# Patient Record
Sex: FEMALE | Race: WHITE | NOT HISPANIC OR LATINO | Employment: OTHER | URBAN - METROPOLITAN AREA
[De-identification: names, ages, dates, MRNs, and addresses within clinical notes are randomized per-mention and may not be internally consistent; named-entity substitution may affect disease eponyms.]

---

## 2017-05-11 ENCOUNTER — TELEPHONE (OUTPATIENT)
Dept: CARDIOLOGY | Facility: CLINIC | Age: 82
End: 2017-05-11

## 2017-05-17 ENCOUNTER — OFFICE VISIT (OUTPATIENT)
Dept: CARDIOLOGY | Facility: CLINIC | Age: 82
End: 2017-05-17

## 2017-05-17 VITALS — SYSTOLIC BLOOD PRESSURE: 130 MMHG | DIASTOLIC BLOOD PRESSURE: 80 MMHG | HEIGHT: 63 IN | WEIGHT: 130 LBS

## 2017-05-17 DIAGNOSIS — I49.3 PVC (PREMATURE VENTRICULAR CONTRACTION): Primary | ICD-10-CM

## 2017-05-17 DIAGNOSIS — I47.1 SUPRAVENTRICULAR TACHYCARDIA (HCC): ICD-10-CM

## 2017-05-17 DIAGNOSIS — I34.0 NON-RHEUMATIC MITRAL REGURGITATION: ICD-10-CM

## 2017-05-17 PROCEDURE — 99214 OFFICE O/P EST MOD 30 MIN: CPT | Performed by: INTERNAL MEDICINE

## 2017-05-17 PROCEDURE — 93000 ELECTROCARDIOGRAM COMPLETE: CPT | Performed by: INTERNAL MEDICINE

## 2017-05-17 RX ORDER — ASPIRIN 81 MG/1
81 TABLET ORAL DAILY
COMMUNITY

## 2017-05-17 RX ORDER — CARVEDILOL 3.12 MG/1
3.12 TABLET ORAL 2 TIMES DAILY
Qty: 180 TABLET | Refills: 3 | Status: SHIPPED | OUTPATIENT
Start: 2017-05-17 | End: 2018-01-04

## 2018-01-04 ENCOUNTER — APPOINTMENT (OUTPATIENT)
Dept: GENERAL RADIOLOGY | Facility: HOSPITAL | Age: 83
End: 2018-01-04

## 2018-01-04 ENCOUNTER — HOSPITAL ENCOUNTER (EMERGENCY)
Facility: HOSPITAL | Age: 83
Discharge: HOME | End: 2018-01-04
Attending: EMERGENCY MEDICINE | Admitting: EMERGENCY MEDICINE

## 2018-01-04 VITALS
HEIGHT: 63 IN | HEART RATE: 85 BPM | WEIGHT: 150 LBS | BODY MASS INDEX: 26.58 KG/M2 | DIASTOLIC BLOOD PRESSURE: 76 MMHG | SYSTOLIC BLOOD PRESSURE: 115 MMHG | TEMPERATURE: 97.9 F | RESPIRATION RATE: 15 BRPM | OXYGEN SATURATION: 95 %

## 2018-01-04 DIAGNOSIS — I47.1 PSVT (PAROXYSMAL SUPRAVENTRICULAR TACHYCARDIA) (HCC): Primary | ICD-10-CM

## 2018-01-04 LAB
ALBUMIN SERPL-MCNC: 4 G/DL (ref 3.5–5.2)
ALBUMIN/GLOB SERPL: 1.3 G/DL
ALP SERPL-CCNC: 61 U/L (ref 39–117)
ALT SERPL W P-5'-P-CCNC: 16 U/L (ref 1–33)
ANION GAP SERPL CALCULATED.3IONS-SCNC: 9 MMOL/L
AST SERPL-CCNC: 21 U/L (ref 1–32)
BASOPHILS # BLD AUTO: 0.03 10*3/MM3 (ref 0–0.2)
BASOPHILS NFR BLD AUTO: 0.3 % (ref 0–1.5)
BILIRUB SERPL-MCNC: 0.4 MG/DL (ref 0.1–1.2)
BUN BLD-MCNC: 15 MG/DL (ref 8–23)
BUN/CREAT SERPL: 17 (ref 7–25)
CALCIUM SPEC-SCNC: 9 MG/DL (ref 8.2–9.6)
CHLORIDE SERPL-SCNC: 98 MMOL/L (ref 98–107)
CO2 SERPL-SCNC: 29 MMOL/L (ref 22–29)
CREAT BLD-MCNC: 0.88 MG/DL (ref 0.57–1)
DEPRECATED RDW RBC AUTO: 45.6 FL (ref 37–54)
EOSINOPHIL # BLD AUTO: 0.2 10*3/MM3 (ref 0–0.7)
EOSINOPHIL NFR BLD AUTO: 2.2 % (ref 0.3–6.2)
ERYTHROCYTE [DISTWIDTH] IN BLOOD BY AUTOMATED COUNT: 13.2 % (ref 11.7–13)
GFR SERPL CREATININE-BSD FRML MDRD: 60 ML/MIN/1.73
GLOBULIN UR ELPH-MCNC: 3.2 GM/DL
GLUCOSE BLD-MCNC: 158 MG/DL (ref 65–99)
HCT VFR BLD AUTO: 38.6 % (ref 35.6–45.5)
HGB BLD-MCNC: 12.2 G/DL (ref 11.9–15.5)
IMM GRANULOCYTES # BLD: 0.02 10*3/MM3 (ref 0–0.03)
IMM GRANULOCYTES NFR BLD: 0.2 % (ref 0–0.5)
LYMPHOCYTES # BLD AUTO: 1.42 10*3/MM3 (ref 0.9–4.8)
LYMPHOCYTES NFR BLD AUTO: 15.9 % (ref 19.6–45.3)
MAGNESIUM SERPL-MCNC: 2.4 MG/DL (ref 1.7–2.3)
MCH RBC QN AUTO: 29.9 PG (ref 26.9–32)
MCHC RBC AUTO-ENTMCNC: 31.6 G/DL (ref 32.4–36.3)
MCV RBC AUTO: 94.6 FL (ref 80.5–98.2)
MONOCYTES # BLD AUTO: 1 10*3/MM3 (ref 0.2–1.2)
MONOCYTES NFR BLD AUTO: 11.2 % (ref 5–12)
NEUTROPHILS # BLD AUTO: 6.25 10*3/MM3 (ref 1.9–8.1)
NEUTROPHILS NFR BLD AUTO: 70.2 % (ref 42.7–76)
PLATELET # BLD AUTO: 209 10*3/MM3 (ref 140–500)
PMV BLD AUTO: 9.5 FL (ref 6–12)
POTASSIUM BLD-SCNC: 4.1 MMOL/L (ref 3.5–5.2)
PROT SERPL-MCNC: 7.2 G/DL (ref 6–8.5)
RBC # BLD AUTO: 4.08 10*6/MM3 (ref 3.9–5.2)
SODIUM BLD-SCNC: 136 MMOL/L (ref 136–145)
TROPONIN T SERPL-MCNC: <0.01 NG/ML (ref 0–0.03)
WBC NRBC COR # BLD: 8.92 10*3/MM3 (ref 4.5–10.7)

## 2018-01-04 PROCEDURE — 83735 ASSAY OF MAGNESIUM: CPT | Performed by: EMERGENCY MEDICINE

## 2018-01-04 PROCEDURE — 96361 HYDRATE IV INFUSION ADD-ON: CPT

## 2018-01-04 PROCEDURE — 96374 THER/PROPH/DIAG INJ IV PUSH: CPT

## 2018-01-04 PROCEDURE — 80053 COMPREHEN METABOLIC PANEL: CPT | Performed by: EMERGENCY MEDICINE

## 2018-01-04 PROCEDURE — 71045 X-RAY EXAM CHEST 1 VIEW: CPT

## 2018-01-04 PROCEDURE — 25010000002 ADENOSINE PER 6 MG: Performed by: EMERGENCY MEDICINE

## 2018-01-04 PROCEDURE — 85025 COMPLETE CBC W/AUTO DIFF WBC: CPT | Performed by: EMERGENCY MEDICINE

## 2018-01-04 PROCEDURE — 93010 ELECTROCARDIOGRAM REPORT: CPT | Performed by: INTERNAL MEDICINE

## 2018-01-04 PROCEDURE — 93005 ELECTROCARDIOGRAM TRACING: CPT | Performed by: EMERGENCY MEDICINE

## 2018-01-04 PROCEDURE — 99285 EMERGENCY DEPT VISIT HI MDM: CPT

## 2018-01-04 PROCEDURE — 84484 ASSAY OF TROPONIN QUANT: CPT | Performed by: EMERGENCY MEDICINE

## 2018-01-04 RX ORDER — ADENOSINE 3 MG/ML
INJECTION, SOLUTION INTRAVENOUS
Status: DISCONTINUED
Start: 2018-01-04 | End: 2018-01-04 | Stop reason: HOSPADM

## 2018-01-04 RX ORDER — CARVEDILOL 3.12 MG/1
6.25 TABLET ORAL 2 TIMES DAILY WITH MEALS
Qty: 180 TABLET | Refills: 0 | Status: SHIPPED | OUTPATIENT
Start: 2018-01-04 | End: 2018-01-06 | Stop reason: HOSPADM

## 2018-01-04 RX ORDER — ADENOSINE 3 MG/ML
6 INJECTION, SOLUTION INTRAVENOUS ONCE
Status: COMPLETED | OUTPATIENT
Start: 2018-01-04 | End: 2018-01-04

## 2018-01-04 RX ADMIN — SODIUM CHLORIDE 1000 ML: 9 INJECTION, SOLUTION INTRAVENOUS at 04:10

## 2018-01-04 RX ADMIN — ADENOSINE 6 MG: 3 INJECTION, SOLUTION INTRAVENOUS at 04:12

## 2018-01-04 NOTE — ED PROVIDER NOTES
"EMERGENCY DEPARTMENT ENCOUNTER       CHIEF COMPLAINT  Chief Complaint: Palpitations  History given by: Patient, Family  History limited by: N/A  Room Number: 16/16  PMD: Dorys Barbour PA-C      HPI:  HPI Comments: Pt with h/o SVT presents to the ED c/o palpitations described as \"rapid\" onset at about 23:00 tonight. Pt denies chest pain, N/V/D, abdominal pain, dizziness/lightheadedness, generalized weakness, diaphoresis, and new BLE edema, but reports that she has had URI-like symptoms for several days. Specifically, pt has had dyspnea and cough for which pt has been taking Mucinex (without decongestant). There are no other complaints at this time.     Dr. Reyes - cardiologist    Patient is a 89 y.o. female presenting with palpitations.   Palpitations   Palpitations quality:  Fast  Onset quality:  Gradual  Duration: onset at about 23:00 tonight.  Timing:  Constant  Progression:  Unchanged  Chronicity:  Recurrent (Pt has h/o SVT)  Context comment:  Pt has h/o SVT  Relieved by:  Nothing  Worsened by:  Nothing  Associated symptoms: cough and shortness of breath    Associated symptoms: no chest pain, no diaphoresis, no dizziness, no leg pain, no malaise/fatigue, no nausea, no numbness, no vomiting and no weakness          PAST MEDICAL HISTORY  Active Ambulatory Problems     Diagnosis Date Noted   • Irregular heart rate 02/04/2016   • Shortness of breath on exertion 02/05/2016   • Supraventricular tachycardia 08/17/2016   • PVC (premature ventricular contraction) 08/17/2016     Resolved Ambulatory Problems     Diagnosis Date Noted   • No Resolved Ambulatory Problems     Past Medical History:   Diagnosis Date   • CANCINO (dyspnea on exertion)    • Frequent PVCs    • Irregular heart beat    • Knee injury    • Mitral regurgitation    • Palpitations    • Supraventricular tachycardia    • Wrist fracture          PAST SURGICAL HISTORY  Past Surgical History:   Procedure Laterality Date   • MASTOID SURGERY     • SKIN GRAFT   " "  • WRIST SURGERY           FAMILY HISTORY  Family History   Problem Relation Age of Onset   • Heart disease Mother    • Diabetes Mother          SOCIAL HISTORY  Social History     Social History   • Marital status:      Spouse name: N/A   • Number of children: N/A   • Years of education: N/A     Occupational History   • Not on file.     Social History Main Topics   • Smoking status: Never Smoker   • Smokeless tobacco: Not on file   • Alcohol use No   • Drug use: No   • Sexual activity: Not on file     Other Topics Concern   • Not on file     Social History Narrative         ALLERGIES  Review of patient's allergies indicates no known allergies.        REVIEW OF SYSTEMS  Review of Systems   Constitutional: Negative for chills, diaphoresis and malaise/fatigue.   HENT: Negative for congestion, rhinorrhea and sore throat.    Eyes: Negative for pain.   Respiratory: Positive for cough and shortness of breath.    Cardiovascular: Positive for palpitations (\"rapid\"). Negative for chest pain and leg swelling (pt denies new BLE edema).   Gastrointestinal: Negative for abdominal pain, diarrhea, nausea and vomiting.   Genitourinary: Negative for difficulty urinating, dysuria, flank pain and frequency.   Musculoskeletal: Negative for myalgias, neck pain and neck stiffness.   Skin: Negative for rash.   Neurological: Negative for dizziness, speech difficulty, weakness, light-headedness, numbness and headaches.   Psychiatric/Behavioral: Negative.    All other systems reviewed and are negative.           PHYSICAL EXAM  ED Triage Vitals   Temp Heart Rate Resp BP SpO2   01/04/18 0341 01/04/18 0338 01/04/18 0338 01/04/18 0338 01/04/18 0338   97.9 °F (36.6 °C) 168 16 79/56 98 % WNL      Temp src Heart Rate Source Patient Position BP Location FiO2 (%)   01/04/18 0341 01/04/18 0338 -- -- --   Tympanic Monitor          Physical Exam   Constitutional: She is oriented to person, place, and time. No distress.   HENT:   Head: " Normocephalic.   Mouth/Throat: Mucous membranes are normal.   Eyes: EOM are normal. Pupils are equal, round, and reactive to light.   Neck: Normal range of motion. Neck supple.   Cardiovascular: Regular rhythm and normal heart sounds.  Tachycardia present.    Pulmonary/Chest: Effort normal and breath sounds normal. No respiratory distress. She has no decreased breath sounds. She has no wheezes. She has no rhonchi. She has no rales.   Abdominal: Soft. There is no tenderness. There is no rebound and no guarding.   Musculoskeletal: Normal range of motion.   Neurological: She is alert and oriented to person, place, and time. She has normal sensation.   Skin: Skin is warm and dry.   Psychiatric: Mood and affect normal.   Nursing note and vitals reviewed.          LAB RESULTS  Recent Results (from the past 24 hour(s))   Comprehensive Metabolic Panel    Collection Time: 01/04/18  4:18 AM   Result Value Ref Range    Glucose 158 (H) 65 - 99 mg/dL    BUN 15 8 - 23 mg/dL    Creatinine 0.88 0.57 - 1.00 mg/dL    Sodium 136 136 - 145 mmol/L    Potassium 4.1 3.5 - 5.2 mmol/L    Chloride 98 98 - 107 mmol/L    CO2 29.0 22.0 - 29.0 mmol/L    Calcium 9.0 8.2 - 9.6 mg/dL    Total Protein 7.2 6.0 - 8.5 g/dL    Albumin 4.00 3.50 - 5.20 g/dL    ALT (SGPT) 16 1 - 33 U/L    AST (SGOT) 21 1 - 32 U/L    Alkaline Phosphatase 61 39 - 117 U/L    Total Bilirubin 0.4 0.1 - 1.2 mg/dL    eGFR Non African Amer 60 (L) >60 mL/min/1.73    Globulin 3.2 gm/dL    A/G Ratio 1.3 g/dL    BUN/Creatinine Ratio 17.0 7.0 - 25.0    Anion Gap 9.0 mmol/L   Troponin    Collection Time: 01/04/18  4:18 AM   Result Value Ref Range    Troponin T <0.010 0.000 - 0.030 ng/mL   Magnesium    Collection Time: 01/04/18  4:18 AM   Result Value Ref Range    Magnesium 2.4 (H) 1.7 - 2.3 mg/dL   CBC Auto Differential    Collection Time: 01/04/18  4:18 AM   Result Value Ref Range    WBC 8.92 4.50 - 10.70 10*3/mm3    RBC 4.08 3.90 - 5.20 10*6/mm3    Hemoglobin 12.2 11.9 - 15.5 g/dL     Hematocrit 38.6 35.6 - 45.5 %    MCV 94.6 80.5 - 98.2 fL    MCH 29.9 26.9 - 32.0 pg    MCHC 31.6 (L) 32.4 - 36.3 g/dL    RDW 13.2 (H) 11.7 - 13.0 %    RDW-SD 45.6 37.0 - 54.0 fl    MPV 9.5 6.0 - 12.0 fL    Platelets 209 140 - 500 10*3/mm3    Neutrophil % 70.2 42.7 - 76.0 %    Lymphocyte % 15.9 (L) 19.6 - 45.3 %    Monocyte % 11.2 5.0 - 12.0 %    Eosinophil % 2.2 0.3 - 6.2 %    Basophil % 0.3 0.0 - 1.5 %    Immature Grans % 0.2 0.0 - 0.5 %    Neutrophils, Absolute 6.25 1.90 - 8.10 10*3/mm3    Lymphocytes, Absolute 1.42 0.90 - 4.80 10*3/mm3    Monocytes, Absolute 1.00 0.20 - 1.20 10*3/mm3    Eosinophils, Absolute 0.20 0.00 - 0.70 10*3/mm3    Basophils, Absolute 0.03 0.00 - 0.20 10*3/mm3    Immature Grans, Absolute 0.02 0.00 - 0.03 10*3/mm3       Ordered the above labs and reviewed the results.        RADIOLOGY  XR Chest 1 View   Final Result   Under aeration with basilar opacities felt to reflect   atelectasis and small effusions           This report was finalized on 1/4/2018 4:28 AM by Wil Jimenez MD.       Interpreted by radiologist. Independently viewed by me.             Ordered the above noted radiological studies. Reviewed by me in PACS.       PROCEDURES  Critical Care  Performed by: IVANNA LEYVA  Authorized by: IVANNA LEYVA     Critical care provider statement:     Critical care time (minutes):  35    Critical care time was exclusive of:  Separately billable procedures and treating other patients    Critical care was necessary to treat or prevent imminent or life-threatening deterioration of the following conditions:  Cardiac failure    Critical care was time spent personally by me on the following activities:  Development of treatment plan with patient or surrogate, evaluation of patient's response to treatment, examination of patient, obtaining history from patient or surrogate, ordering and performing treatments and interventions, ordering and review of laboratory studies, ordering and review  of radiographic studies, pulse oximetry, re-evaluation of patient's condition and discussions with consultants            EKG #1 (pre-adenosine treatment):           EKG time: 03:58 AM  Rhythm/Rate: SVT rate 159  P waves and CA: None present  QRS, axis: Normal QRS   ST and T waves: Rate-related ST depression     Interpreted Contemporaneously by me, independently viewed  No old EKG is available for comparison           EKG #2 (post-adenosine treatment):          EKG time: 04:33 AM  Rhythm/Rate: NSR rate 93  P waves and CA: Normal P waves  QRS, axis: Normal QRS   ST and T waves: Normal ST     Interpreted Contemporaneously by me, independently viewed  changed compared to prior EKG #1 (pt is no longer in SVT)          CHEMICAL CARDIOVERSION:  Time: 04:12 AM  Initial cardiac rhythm/rate: SVT rate 160s  Treatment: Adenosine 6 mg  Post-treatment cardiac rhythm/rate: Sinus tachycardia rate 105  Pt tolerated procedure without significant difficulty.          PROGRESS AND CONSULTS  ED Course   Comment By Time   5:59 AM  Patient here with SVT.  Given fluids and adenosine with resolution of rhythm.  Lab work normal.   Discussed with Dr. Andrews.  Will double coreg dose.  She will decrease it if she gets lightheaded.  Will follow up with Dr. Reyes. Mario Shelley MD 01/04 0601     4:06 AM:  Pt appears to be in SVT. Valsalva maneuver was performed - pt remains in SVT. Pt was informed that she is in SVT for which pt will be administered adenosine. Pt agrees with plan.     4:10 AM:  Blood work and CXR ordered for further evaluation. IV fluids ordered to hydrate pt.     4:12 AM:  Pt was administered adenosine 6 mg as pt is in SVT. After administration of the adenosine, pt has converted out of SVT and is currently in sinus tachycardia rate 100s.     5:11 AM:  Rechecked pt. On re-evaluation, pt is in NSR rate 90s. Pt is resting comfortably and appears in no acute distress. Informed pt that her troponin is negative. Pt was  informed that she has converted out of SVT and is currently in NSR. Will discuss further course of care with the cardiologist. Pt agrees with plan.    5:15 AM:  Placed call to [facility] to discuss further course of care.     5:50 AM:  Discussed the case with Dr. Andrews, cardiologist. He would like pt's Coreg dose to be doubled and for pt to f/u with Dr. Reyes, cardiologist, in the office.    5:52 AM:  Rechecked pt. Informed pt of my d/w Dr. Andrews, cardiologist on-call for Dr. Reyes. Pt was instructed to double her current Coreg dose. Pt was advised to f/u with cardiologist. RTER warnings given. Pt agrees with plan for discharge.           MEDICAL DECISION MAKING        MDM  Number of Diagnoses or Management Options     Amount and/or Complexity of Data Reviewed  Clinical lab tests: ordered and reviewed (Troponin is negative.)  Tests in the radiology section of CPT®: ordered and reviewed (CXR:  Under aeration with basilar opacities felt to reflect  atelectasis and small effusions.)  Tests in the medicine section of CPT®: reviewed and ordered (EKG interpreted.   )  Discuss the patient with other providers: yes (Discussed the case with Dr. Andrews, cardiologist. )    Patient Progress  Patient progress: stable             DIAGNOSIS  Final diagnoses:   PSVT (paroxysmal supraventricular tachycardia)         DISPOSITION  Pt discharged.    DISCHARGE    Patient discharged in stable condition.    Reviewed implications of results, diagnosis, meds, responsibility to follow up, warning signs and symptoms of possible worsening, potential complications and reasons to return to ER.    Patient/Family voiced understanding of above instructions.    Discussed plan for discharge, as there is no emergent indication for admission.  Pt/family is agreeable and understands need for follow up and repeat testing.  Pt is aware that discharge does not mean that nothing is wrong but it indicates no emergency is present that  requires admission and they must continue care with follow-up as given below or physician of their choice.     FOLLOW-UP  Chai Reyes MD  [id number]  [place] [id number] [phone]    Call today           Medication List      Changed          carvedilol 3.125 MG tablet   Commonly known as:  COREG   Take 2 tablets by mouth 2 (Two) Times a Day With Meals.   What changed:    - how much to take  - when to take this                   Latest Documented Vital Signs:  As of 6:04 AM  BP- 115/76 HR- 85 Temp- 97.9 °F (36.6 °C) (Tympanic) O2 sat- 95%        --  Documentation assistance provided by lisseth Gutierrez for Dr. Daphney MD.  Information recorded by the scribe was done at my direction and has been verified and validated by me.             Joana Gutierrez  01/04/18 0816       Mario Shelley MD  01/04/18 0816       Mario Shelley MD  01/04/18 0828

## 2018-01-05 ENCOUNTER — HOSPITAL ENCOUNTER (OUTPATIENT)
Facility: HOSPITAL | Age: 83
Setting detail: OBSERVATION
Discharge: HOME | End: 2018-01-06
Attending: EMERGENCY MEDICINE | Admitting: INTERNAL MEDICINE

## 2018-01-05 DIAGNOSIS — I47.1 PSVT (PAROXYSMAL SUPRAVENTRICULAR TACHYCARDIA) (HCC): Primary | ICD-10-CM

## 2018-01-05 PROBLEM — I47.10 PSVT (PAROXYSMAL SUPRAVENTRICULAR TACHYCARDIA): Status: ACTIVE | Noted: 2018-01-05

## 2018-01-05 LAB
ANION GAP SERPL CALCULATED.3IONS-SCNC: 10.4 MMOL/L
BUN BLD-MCNC: 14 MG/DL (ref 8–23)
BUN/CREAT SERPL: 20.3 (ref 7–25)
CALCIUM SPEC-SCNC: 8.7 MG/DL (ref 8.2–9.6)
CHLORIDE SERPL-SCNC: 102 MMOL/L (ref 98–107)
CO2 SERPL-SCNC: 26.6 MMOL/L (ref 22–29)
CREAT BLD-MCNC: 0.69 MG/DL (ref 0.57–1)
GFR SERPL CREATININE-BSD FRML MDRD: 79 ML/MIN/1.73
GLUCOSE BLD-MCNC: 112 MG/DL (ref 65–99)
MAGNESIUM SERPL-MCNC: 2.1 MG/DL (ref 1.7–2.3)
POTASSIUM BLD-SCNC: 3.9 MMOL/L (ref 3.5–5.2)
POTASSIUM BLD-SCNC: 4 MMOL/L (ref 3.5–5.2)
SODIUM BLD-SCNC: 139 MMOL/L (ref 136–145)
T4 FREE SERPL-MCNC: 1.05 NG/DL (ref 0.93–1.7)
TROPONIN T SERPL-MCNC: 0.05 NG/ML (ref 0–0.03)
TROPONIN T SERPL-MCNC: 0.05 NG/ML (ref 0–0.03)
TSH SERPL DL<=0.05 MIU/L-ACNC: 4.46 MIU/ML (ref 0.27–4.2)

## 2018-01-05 PROCEDURE — 84439 ASSAY OF FREE THYROXINE: CPT | Performed by: EMERGENCY MEDICINE

## 2018-01-05 PROCEDURE — G0378 HOSPITAL OBSERVATION PER HR: HCPCS

## 2018-01-05 PROCEDURE — 25010000002 ADENOSINE PER 6 MG

## 2018-01-05 PROCEDURE — 99213 OFFICE O/P EST LOW 20 MIN: CPT | Performed by: NURSE PRACTITIONER

## 2018-01-05 PROCEDURE — 83735 ASSAY OF MAGNESIUM: CPT | Performed by: INTERNAL MEDICINE

## 2018-01-05 PROCEDURE — 84443 ASSAY THYROID STIM HORMONE: CPT | Performed by: EMERGENCY MEDICINE

## 2018-01-05 PROCEDURE — 99220 PR INITIAL OBSERVATION CARE/DAY 70 MINUTES: CPT | Performed by: INTERNAL MEDICINE

## 2018-01-05 PROCEDURE — 84132 ASSAY OF SERUM POTASSIUM: CPT | Performed by: INTERNAL MEDICINE

## 2018-01-05 PROCEDURE — 99284 EMERGENCY DEPT VISIT MOD MDM: CPT

## 2018-01-05 PROCEDURE — 93005 ELECTROCARDIOGRAM TRACING: CPT

## 2018-01-05 PROCEDURE — 93010 ELECTROCARDIOGRAM REPORT: CPT | Performed by: INTERNAL MEDICINE

## 2018-01-05 PROCEDURE — 84484 ASSAY OF TROPONIN QUANT: CPT | Performed by: INTERNAL MEDICINE

## 2018-01-05 PROCEDURE — 80048 BASIC METABOLIC PNL TOTAL CA: CPT | Performed by: EMERGENCY MEDICINE

## 2018-01-05 PROCEDURE — 93005 ELECTROCARDIOGRAM TRACING: CPT | Performed by: INTERNAL MEDICINE

## 2018-01-05 PROCEDURE — 84484 ASSAY OF TROPONIN QUANT: CPT | Performed by: EMERGENCY MEDICINE

## 2018-01-05 PROCEDURE — 96374 THER/PROPH/DIAG INJ IV PUSH: CPT

## 2018-01-05 RX ORDER — SODIUM CHLORIDE 0.9 % (FLUSH) 0.9 %
1-10 SYRINGE (ML) INJECTION AS NEEDED
Status: DISCONTINUED | OUTPATIENT
Start: 2018-01-05 | End: 2018-01-06 | Stop reason: HOSPADM

## 2018-01-05 RX ORDER — ADENOSINE 3 MG/ML
INJECTION, SOLUTION INTRAVENOUS
Status: COMPLETED
Start: 2018-01-05 | End: 2018-01-05

## 2018-01-05 RX ORDER — DIPHENOXYLATE HYDROCHLORIDE AND ATROPINE SULFATE 2.5; .025 MG/1; MG/1
1 TABLET ORAL DAILY
Status: DISCONTINUED | OUTPATIENT
Start: 2018-01-05 | End: 2018-01-06 | Stop reason: HOSPADM

## 2018-01-05 RX ORDER — CARVEDILOL 6.25 MG/1
6.25 TABLET ORAL 2 TIMES DAILY WITH MEALS
Status: DISCONTINUED | OUTPATIENT
Start: 2018-01-05 | End: 2018-01-06 | Stop reason: HOSPADM

## 2018-01-05 RX ORDER — NITROGLYCERIN 0.4 MG/1
0.4 TABLET SUBLINGUAL
Status: DISCONTINUED | OUTPATIENT
Start: 2018-01-05 | End: 2018-01-06 | Stop reason: HOSPADM

## 2018-01-05 RX ORDER — ASPIRIN 81 MG/1
81 TABLET ORAL DAILY
Status: DISCONTINUED | OUTPATIENT
Start: 2018-01-05 | End: 2018-01-06 | Stop reason: HOSPADM

## 2018-01-05 RX ORDER — ADENOSINE 3 MG/ML
6 INJECTION, SOLUTION INTRAVENOUS ONCE
Status: COMPLETED | OUTPATIENT
Start: 2018-01-05 | End: 2018-01-05

## 2018-01-05 RX ADMIN — ADENOSINE 6 MG: 3 INJECTION, SOLUTION INTRAVENOUS at 00:41

## 2018-01-05 RX ADMIN — METOPROLOL TARTRATE 5 MG: 5 INJECTION, SOLUTION INTRAVENOUS at 16:59

## 2018-01-05 RX ADMIN — Medication 1 TABLET: at 15:01

## 2018-01-05 RX ADMIN — CARVEDILOL 6.25 MG: 6.25 TABLET, FILM COATED ORAL at 17:06

## 2018-01-05 RX ADMIN — ASPIRIN 81 MG: 81 TABLET ORAL at 15:01

## 2018-01-05 NOTE — CONSULTS
Electrophysiology Hospital Consult            Patient Name: Jaqueline Rojas  Age/Sex: 89 y.o. female  : 1924  MRN: [id number]    Date of Admission: 2018  Date of Encounter Visit: 18  Encounter Provider: PAULIE Paula  Referring Provider: Dayne Castro MD  Place of Service: [facility] CARDIOLOGY  Patient Care Team:  Dorys Barbour PA-C as PCP - General (Family Medicine)  Chai Reyes MD as PCP - Claims Attributed      Subjective:   EP Consultation for: SVT    Chief Complaint: Palpitation, elevated HR    History of Present Illness:  Jaqueline Rojas is a 89 y.o. female patient of Dr. Reyes with a past medical history of SVT, PVC's and mitral insufficiency. He saw her in the office in May 2017 and she complained of some occasional palpitations. He recommended increasing her BB but she wanted to continue to watch. She had a Holter in 2016 which showed 20% PVC's and 1, 33 minute episode of SVT, . She was started on metoprolol but did not tolerate it and was switched to carvedilol which she has tolerated. She presented to the ED  with palpitations, rapid heart beat, dyspnea and cough. She was found to be in SVT,  and she was given adenosine. She was instructed to double her carvedilol and was discharged home. She returned to ED early this morning again in SVT so she was admitted for further evaluation and treatment. She had an echo in 2016 that showed normal LV systolic function, EF 55%, moderate to severe MR and mild TR. We have been ask to see for assistance/management of her SVT.     She had an episode this morning that lasted about 20-30 minutes but spontaneously converted. She has not gotten a dose of carvedilol since admission. She is currently in SR.       Past Medical History:  Past Medical History:   Diagnosis Date   • CANCINO (dyspnea on exertion)    • Frequent PVCs     multifocal   • Irregular heart beat    • Knee injury     • Mitral regurgitation     Moderate to Severe per Echocardiogram 8/2016   • Palpitations    • Supraventricular tachycardia    • Wrist fracture        Past Surgical History:   Procedure Laterality Date   • MASTOID SURGERY     • SKIN GRAFT     • WRIST SURGERY         Home Medications:   Prescriptions Prior to Admission   Medication Sig Dispense Refill Last Dose   • aspirin 81 MG EC tablet Take 81 mg by mouth Daily.   Taking   • carvedilol (COREG) 3.125 MG tablet Take 2 tablets by mouth 2 (Two) Times a Day With Meals. 180 tablet 0    • Multiple Vitamin (MULTIVITAMIN) tablet Take 1 tablet by mouth daily.   Taking       Allergies:  No Known Allergies    Past Social History:  Social History     Social History   • Marital status:      Spouse name: N/A   • Number of children: N/A   • Years of education: N/A     Occupational History   • Not on file.     Social History Main Topics   • Smoking status: Never Smoker   • Smokeless tobacco: Never Used   • Alcohol use No   • Drug use: No   • Sexual activity: Not on file     Other Topics Concern   • Not on file     Social History Narrative   • No narrative on file       Past Family History:  Family History   Problem Relation Age of Onset   • Heart disease Mother    • Diabetes Mother        Review of Systems: All systems reviewed. Pertinent positives identified in HPI. All other systems are negative.     14 point ROS was performed and is negative except as outlined in HPI.     Objective:     Objective:  Vital Signs (last 24 hours)       01/04 0700  -  01/05 0659 01/05 0700  -  01/05 0839   Most Recent    Temp (°F)   97.7      97.9     97.9 (36.6)    Heart Rate 64 -  (!)151      92     92    Resp   16      18     18    /89 -  128/72      128/81     128/81    SpO2 (%) 98 -  100      95     95        Temp:  [97.7 °F (36.5 °C)-97.9 °F (36.6 °C)] 97.9 °F (36.6 °C)  Heart Rate:  [] 92  Resp:  [16-18] 18  BP: (109-128)/(72-94) 128/81  Body mass index is 22.47  kg/(m^2).        Physical Exam:   Physical Exam   Constitutional: She is oriented to person, place, and time. No distress.   HENT:   Head: Normocephalic and atraumatic.   Eyes: Conjunctivae are normal.   Neck: Neck supple.   Cardiovascular: Normal rate, regular rhythm, normal heart sounds and intact distal pulses.    Pulmonary/Chest: Effort normal and breath sounds normal. No respiratory distress.   Abdominal: Soft.   Musculoskeletal: Normal range of motion. She exhibits no edema.   Neurological: She is alert and oriented to person, place, and time.   Skin: Skin is warm and dry.   Psychiatric: She has a normal mood and affect. Her behavior is normal.   Vitals reviewed.       Labs:   Lab Review:       Results from last 7 days  Lab Units 18  0041 18  0418   SODIUM mmol/L 139 136   POTASSIUM mmol/L 4.0 4.1   CHLORIDE mmol/L 102 98   CO2 mmol/L 26.6 29.0   BUN mg/dL 14 15   CREATININE mg/dL 0.69 0.88   GLUCOSE mg/dL 112* 158*   CALCIUM mg/dL 8.7 9.0   AST (SGOT) U/L  --  21   ALT (SGPT) U/L  --  16       Results from last 7 days  Lab Units 18  0041 18  0418   TROPONIN T ng/mL 0.048* <0.010       Results from last 7 days  Lab Units 18  0418   WBC 10*3/mm3 8.92   HEMOGLOBIN g/dL 12.2   HEMATOCRIT % 38.6   PLATELETS 10*3/mm3 209               Results from last 7 days  Lab Units 18  0418   MAGNESIUM mg/dL 2.4*                   Results from last 7 days  Lab Units 18  0041   TSH mIU/mL 4.460*           Imagin/2016 Echo:    Interpretation Summary      · Calculated EF = 55%  · All left ventricular wall segments contract normally.  · Left ventricular function is normal.  · Left ventricular diastolic dysfunction (grade I a) consistent with impaired relaxation.  · Moderate-to-severe mitral valve regurgitation is present  · Mild tricuspid valve regurgitation is present.  · Estimated right ventricular systolic pressure from tricuspid regurgitation is normal (<35 mmHg).     2016  "Holter:    Findings      Event Monitor  Event monitor enrollment date was 8/3/2016. Enrollment ended on 8/7/2016. Data was sent to the physician on 8/12/2016. The patient underwent continuous telemetry monitoring for 3 days and 21 hours.The underlying rhythm was sinus rhythm, heart rate  bpm, average heart rate 83 bpm. There was one episode of supraventricular tachycardia that lasted 33 minutes with an average heart rate of 188 8/m. This was symptomatic. It is a long R-P tachycardia without evidence of pre-excitation, and is narrow complex.There were very frequent premature ventricular contractions. 20.5% of the total heartbeats were PVCs. These also frequently occurred in couplets, which were multifocal.Interpretation:Markedly abnormal rhythm monitor with numerous PVC's and an episode of sustained SVT.            EKG:                   I personally viewed and interpreted the patient's EKG/Telemetry data.    Assessment:     Active Problems:    PSVT (paroxysmal supraventricular tachycardia)        Plan:     Dr. Schafer and I saw Ms. Rojas. Discussed treatment options with her but given her age would recommend conservative approach, she agrees. Will give an IV dose of metoprolol and have them go ahead and start the increased dose of carvedilol (6.25mg bid). Will watch her overnight and she can likely go home tomorrow. If reoccurs then can consider ablation. Sometimes these things come in \"flurries\" and it may simmer down and not happen again for awhile.     Thank you for allowing me to participate in the care of Jaqueline Rojas. Feel free to contact me directly with any further questions or concerns.    PAULIE Paula  [facility]  01/05/18  8:39 AM    "

## 2018-01-05 NOTE — PROGRESS NOTES
Discharge Planning Assessment  [facility]     Patient Name: Jaqueline Rojas  MRN: [id number]  Today's Date: 1/5/2018    Admit Date: 1/5/2018          Discharge Needs Assessment       01/05/18 1103    Living Environment    Lives With alone    Living Arrangements house    Home Accessibility grab bars present (bathtub);stairs within home;grab bars present (toilet)    Stair Railings at Home inside, present on right side    Type of Financial/Environmental Concern none    Transportation Available car;family or friend will provide    Living Environment    Quality Of Family Relationships supportive    Able to Return to Prior Living Arrangements yes    Discharge Needs Assessment    Concerns To Be Addressed no discharge needs identified;denies needs/concerns at this time    Readmission Within The Last 30 Days no previous admission in last 30 days    Equipment Currently Used at Home none    Equipment Needed After Discharge none            Discharge Plan       01/05/18 1105    Case Management/Social Work Plan    Plan Home     Patient/Family In Agreement With Plan yes    Additional Comments CCP met with patient and patient's daughter, Magda ([phone]). CCP role explained, discharge planning discussed, and face sheet verified. Patient lives alone, in a house. Patient has steps within her home with handrails present. Patient does not use any medical equipment and is independent with her ADLS. Patient uses [employer] on [address]. Patient denies having trouble affording her medications and denies having trouble remembering to take her medications. Patient has transportation home. CCP will follow for discharge home and assist with any needs that may arise. Sabi Coker W         Discharge Placement     No information found                Demographic Summary       01/05/18 1102    Referral Information    Admission Type observation    Arrived From admitted as an inpatient    Referral Source admission list    Reason For  Consult discharge planning    Record Reviewed history and physical;patient profile            Functional Status       01/05/18 1103    Functional Status Current    Ambulation 0-->independent    Transferring 0-->independent    Toileting 0-->independent    Bathing 0-->independent    Dressing 0-->independent    Eating 0-->independent    Communication 0-->understands/communicates without difficulty    Swallowing (if score 2 or more for any item, consult Rehab Services) 0-->swallows foods/liquids without difficulty    Functional Status Prior    Ambulation 0-->independent    Transferring 0-->independent    Toileting 0-->independent    Bathing 0-->independent    Dressing 0-->independent    Eating 0-->independent    Communication 0-->understands/communicates without difficulty    Swallowing 0-->swallows foods/liquids without difficulty    IADL    Medications independent    Meal Preparation independent    Housekeeping independent    Laundry independent    Shopping independent    Oral Care independent    Activity Tolerance    Current Activity Limitations none    Cognitive/Perceptual/Developmental    Current Mental Status/Cognitive Functioning no deficits noted    Employment/Financial    Financial Concerns none            Psychosocial     None            Abuse/Neglect     None            Legal     None            Substance Abuse     None            Patient Forms     None          SUPRIYA Hudson

## 2018-01-05 NOTE — ED PROVIDER NOTES
"EMERGENCY DEPARTMENT ENCOUNTER       CHIEF COMPLAINT  Chief Complaint: Palpitations  History given by: Patient  History limited by: N/A  Room Number: 17/17  PMD: Dorys Barbour PA-C      HPI:  HPI Comments: Pt has h/o SVT. Pt was seen in the ER last night secondary to palpitations. While in the ER, pt was in SVT due to which pt was administered Adenosine 6 mg. Upon administration of the Adenosine, pt converted out of SVT and remained in sinus rhythm during her stay in the ER. The case was d/w the cardiologist, pt's dose of Coreg was doubled, and pt was discharged. Pt presents to the ED c/o palpitations described as \"rapid\" onset tonight. Pt denies dyspnea, chest pain, N/V/D, abdominal pain, generalized weakness, dizziness/lightheadedness, and diaphoresis. Pt reports that her current sx are similar to her prior sx with SVT. Pt reports that she has taken two doses of the doubled Coreg regimen since being discharged from the hospital last night. Pt reports that she is scheduled for an appointment with her cardiologist later today. There are no other complaints at this time.         Patient is a 89 y.o. female presenting with palpitations.   Palpitations   Palpitations quality:  Fast  Onset quality:  Gradual  Duration: onset tonight.  Timing:  Constant  Progression:  Unchanged  Chronicity:  Recurrent (Pt was seen in the ER last night secondary to SVT)  Context comment:  Pt was seen in the ER last night secondary to SVT.  Relieved by:  Nothing  Worsened by:  Nothing  Associated symptoms: no chest pain, no chest pressure, no diaphoresis, no dizziness, no nausea, no numbness, no shortness of breath, no vomiting and no weakness          PAST MEDICAL HISTORY  Active Ambulatory Problems     Diagnosis Date Noted   • Irregular heart rate 02/04/2016   • Shortness of breath on exertion 02/05/2016   • Supraventricular tachycardia 08/17/2016   • PVC (premature ventricular contraction) 08/17/2016     Resolved Ambulatory Problems " "    Diagnosis Date Noted   • No Resolved Ambulatory Problems     Past Medical History:   Diagnosis Date   • CANCINO (dyspnea on exertion)    • Frequent PVCs    • Irregular heart beat    • Knee injury    • Mitral regurgitation    • Palpitations    • Supraventricular tachycardia    • Wrist fracture          PAST SURGICAL HISTORY  Past Surgical History:   Procedure Laterality Date   • MASTOID SURGERY     • SKIN GRAFT     • WRIST SURGERY           FAMILY HISTORY  Family History   Problem Relation Age of Onset   • Heart disease Mother    • Diabetes Mother          SOCIAL HISTORY  Social History     Social History   • Marital status:      Spouse name: N/A   • Number of children: N/A   • Years of education: N/A     Occupational History   • Not on file.     Social History Main Topics   • Smoking status: Never Smoker   • Smokeless tobacco: Never Used   • Alcohol use No   • Drug use: No   • Sexual activity: Not on file     Other Topics Concern   • Not on file     Social History Narrative   • No narrative on file         ALLERGIES  Review of patient's allergies indicates no known allergies.        REVIEW OF SYSTEMS  Review of Systems   Constitutional: Negative for chills and diaphoresis.   HENT: Negative for congestion and rhinorrhea.    Eyes: Negative for pain.   Respiratory: Negative for shortness of breath.    Cardiovascular: Positive for palpitations (\"rapid\"). Negative for chest pain and leg swelling.   Gastrointestinal: Negative for abdominal pain, diarrhea, nausea and vomiting.   Genitourinary: Negative for difficulty urinating, dysuria, flank pain and frequency.   Musculoskeletal: Negative for myalgias, neck pain and neck stiffness.   Skin: Negative for rash.   Neurological: Negative for dizziness, speech difficulty, weakness, light-headedness, numbness and headaches.   Psychiatric/Behavioral: Negative.    All other systems reviewed and are negative.           PHYSICAL EXAM  ED Triage Vitals   Temp Heart Rate Resp " BP SpO2   01/05/18 0025 01/05/18 0023 01/05/18 0023 01/05/18 0023 01/05/18 0023   97.7 °F (36.5 °C) 151 16 116/94 98 % WNL      Temp src Heart Rate Source Patient Position BP Location FiO2 (%)   01/05/18 0025 01/05/18 0023 -- -- --   Oral Monitor          Physical Exam   Constitutional: She is oriented to person, place, and time. No distress.   HENT:   Head: Normocephalic.   Mouth/Throat: Mucous membranes are normal.   Eyes: EOM are normal. Pupils are equal, round, and reactive to light.   Neck: Normal range of motion. Neck supple.   Cardiovascular: Regular rhythm and normal heart sounds.  Tachycardia present.    Pulmonary/Chest: Effort normal and breath sounds normal. No respiratory distress. She has no decreased breath sounds. She has no wheezes. She has no rhonchi. She has no rales.   Abdominal: Soft. There is no tenderness. There is no rebound and no guarding.   Musculoskeletal: Normal range of motion.   Neurological: She is alert and oriented to person, place, and time. She has normal sensation.   Skin: Skin is warm and dry.   Psychiatric: Mood and affect normal.   Nursing note and vitals reviewed.          LAB RESULTS  Recent Results (from the past 24 hour(s))   Comprehensive Metabolic Panel    Collection Time: 01/04/18  4:18 AM   Result Value Ref Range    Glucose 158 (H) 65 - 99 mg/dL    BUN 15 8 - 23 mg/dL    Creatinine 0.88 0.57 - 1.00 mg/dL    Sodium 136 136 - 145 mmol/L    Potassium 4.1 3.5 - 5.2 mmol/L    Chloride 98 98 - 107 mmol/L    CO2 29.0 22.0 - 29.0 mmol/L    Calcium 9.0 8.2 - 9.6 mg/dL    Total Protein 7.2 6.0 - 8.5 g/dL    Albumin 4.00 3.50 - 5.20 g/dL    ALT (SGPT) 16 1 - 33 U/L    AST (SGOT) 21 1 - 32 U/L    Alkaline Phosphatase 61 39 - 117 U/L    Total Bilirubin 0.4 0.1 - 1.2 mg/dL    eGFR Non African Amer 60 (L) >60 mL/min/1.73    Globulin 3.2 gm/dL    A/G Ratio 1.3 g/dL    BUN/Creatinine Ratio 17.0 7.0 - 25.0    Anion Gap 9.0 mmol/L   Troponin    Collection Time: 01/04/18  4:18 AM   Result  Value Ref Range    Troponin T <0.010 0.000 - 0.030 ng/mL   Magnesium    Collection Time: 01/04/18  4:18 AM   Result Value Ref Range    Magnesium 2.4 (H) 1.7 - 2.3 mg/dL   CBC Auto Differential    Collection Time: 01/04/18  4:18 AM   Result Value Ref Range    WBC 8.92 4.50 - 10.70 10*3/mm3    RBC 4.08 3.90 - 5.20 10*6/mm3    Hemoglobin 12.2 11.9 - 15.5 g/dL    Hematocrit 38.6 35.6 - 45.5 %    MCV 94.6 80.5 - 98.2 fL    MCH 29.9 26.9 - 32.0 pg    MCHC 31.6 (L) 32.4 - 36.3 g/dL    RDW 13.2 (H) 11.7 - 13.0 %    RDW-SD 45.6 37.0 - 54.0 fl    MPV 9.5 6.0 - 12.0 fL    Platelets 209 140 - 500 10*3/mm3    Neutrophil % 70.2 42.7 - 76.0 %    Lymphocyte % 15.9 (L) 19.6 - 45.3 %    Monocyte % 11.2 5.0 - 12.0 %    Eosinophil % 2.2 0.3 - 6.2 %    Basophil % 0.3 0.0 - 1.5 %    Immature Grans % 0.2 0.0 - 0.5 %    Neutrophils, Absolute 6.25 1.90 - 8.10 10*3/mm3    Lymphocytes, Absolute 1.42 0.90 - 4.80 10*3/mm3    Monocytes, Absolute 1.00 0.20 - 1.20 10*3/mm3    Eosinophils, Absolute 0.20 0.00 - 0.70 10*3/mm3    Basophils, Absolute 0.03 0.00 - 0.20 10*3/mm3    Immature Grans, Absolute 0.02 0.00 - 0.03 10*3/mm3   Basic Metabolic Panel    Collection Time: 01/05/18 12:41 AM   Result Value Ref Range    Glucose 112 (H) 65 - 99 mg/dL    BUN 14 8 - 23 mg/dL    Creatinine 0.69 0.57 - 1.00 mg/dL    Sodium 139 136 - 145 mmol/L    Potassium 4.0 3.5 - 5.2 mmol/L    Chloride 102 98 - 107 mmol/L    CO2 26.6 22.0 - 29.0 mmol/L    Calcium 8.7 8.2 - 9.6 mg/dL    eGFR Non African Amer 79 >60 mL/min/1.73    BUN/Creatinine Ratio 20.3 7.0 - 25.0    Anion Gap 10.4 mmol/L   Troponin    Collection Time: 01/05/18 12:41 AM   Result Value Ref Range    Troponin T 0.048 (H) 0.000 - 0.030 ng/mL   TSH    Collection Time: 01/05/18 12:41 AM   Result Value Ref Range    TSH 4.460 (H) 0.270 - 4.200 mIU/mL   T4, Free    Collection Time: 01/05/18 12:41 AM   Result Value Ref Range    Free T4 1.05 0.93 - 1.70 ng/dL       Ordered the above labs and reviewed the  results.            PROCEDURES  Procedures    EKG #1 (pre-adenosine treatment):           EKG time: 12:28 AM  Rhythm/Rate: SVT rate 155  P waves and MO: None present  QRS, axis: Normal QRS   ST and T waves: Rate-related ST depression     Interpreted Contemporaneously by me, independently viewed  changed compared to prior 01/04/18 at 04:33 AM (pt was in NSR on prior EKG)         EKG #2 (post-adenosine treatment):           EKG time: 12:37 AM  Rhythm/Rate: Sinus tachycardia rate 105  P waves and MO: Normal P waves  QRS, axis: Normal QRS   ST and T waves: Normal ST     Interpreted Contemporaneously by me, independently viewed  changed compared to prior EKG #1 (SVT has resolved)          CHEMICAL CARDIOVERSION:  Time: 12:37 AM  Initial cardiac rhythm/rate: SVT rate 150s  Treatment: Adenosine 6 mg  Post-treatment cardiac rhythm/rate: Sinus tachycardia rate 103  Pt tolerated procedure without significant difficulty.            PROGRESS AND CONSULTS  ED Course   Comment By Time   1:23 AM  Patient with recurrent SVT.  Given adenosine last night and again tonight with resolution.  No chest pain now.  Discussed with Dr. Chow.  Will admit for observation. Mario Shelley MD 01/05 0124     12:33 AM:  Pt appears to be in SVT with HR 150s. Valsalva maneuver was performed - pt remains in SVT. Pt was informed that she is in SVT for which pt will be administered adenosine once again. Pt agrees with plan.      12:37 AM:  Pt was administered adenosine 6 mg as pt is in SVT. After administration of the adenosine, pt has converted out of SVT and is currently in sinus tachycardia rate 100s.     12:39 AM:  Blood work ordered for further evaluation. Placed call to the cardiologist to discuss further course of care.     12:52 AM:  Discussed the case with Dr. Castro, cardiologist. He will admit pt to a telemetry bed for further evaluation and management/treatment. He would like a thyroid panel ordered. Decision time to admit:  Now.     12:58 AM:  Free T4/TSH ordered as d/w Dr. Castro. Pt has been informed that she has been admitted to the cardiologist for further evaluation and management of her paroxysmal SVT. Pt agrees with plan.             MEDICAL DECISION MAKING        MDM  Number of Diagnoses or Management Options     Amount and/or Complexity of Data Reviewed  Clinical lab tests: ordered and reviewed (TSH is 4.46. )  Tests in the medicine section of CPT®: reviewed and ordered (EKG interpreted.   )  Decide to obtain previous medical records or to obtain history from someone other than the patient: yes  Review and summarize past medical records: yes (Pt has h/o SVT. Pt was seen in the ER last night secondary to palpitations. While in the ER, pt was in SVT due to which pt was administered Adenosine 6 mg. Upon administration of the adenosine, pt converted out of SVT. The case was d/w the cardiologist, pt's dose of Coreg was doubled, and pt was discharged. Pt's CXR showed   under aeration with basilar opacities felt to reflect      atelectasis and small effusions.    )  Discuss the patient with other providers: yes (Discussed the case with Dr. Castro, cardiologist. )    Patient Progress  Patient progress: stable             DIAGNOSIS  Final diagnoses:   PSVT (paroxysmal supraventricular tachycardia)         DISPOSITION  Pt admitted to telemetry.      ADMISSION    Discussed treatment plan and reason for admission with pt/family and admitting physician.  Pt/family voiced understanding of the plan for admission for further testing/treatment as needed.                 Latest Documented Vital Signs:  As of 2:47 AM  BP- 128/72 HR- 64 Temp- 97.7 °F (36.5 °C) (Oral) O2 sat- 98%        --  Documentation assistance provided by lisseth Gutierrez for Dr. Daphney MD.  Information recorded by the lisseth was done at my direction and has been verified and validated by me.           Joana Gutierrez  01/05/18 0133       Mario Shelley,  MD  01/05/18 0312

## 2018-01-05 NOTE — PLAN OF CARE
Problem: Patient Care Overview (Adult)  Goal: Plan of Care Review   01/05/18 0513   Coping/Psychosocial Response Interventions   Plan Of Care Reviewed With patient   Patient Care Overview   Progress no change   Outcome Evaluation   Outcome Summary/Follow up Plan new admit, in SR, NO C/O OF PAIN OR NAUSEA, Up Ad beto, VSS will contine        Problem: Cardiac Output, Decreased (Adult)  Goal: Identify Related Risk Factors and Signs and Symptoms  Outcome: Outcome(s) achieved Date Met: 01/05/18    Goal: Adequate Cardiac Output/Effective Tissue Perfusion  Outcome: Ongoing (interventions implemented as appropriate)      Problem: Pain, Acute (Adult)  Goal: Identify Related Risk Factors and Signs and Symptoms  Outcome: Outcome(s) achieved Date Met: 01/05/18    Goal: Acceptable Pain Control/Comfort Level  Outcome: Ongoing (interventions implemented as appropriate)      Problem: Fall Risk (Adult)  Goal: Identify Related Risk Factors and Signs and Symptoms  Outcome: Outcome(s) achieved Date Met: 01/05/18    Goal: Absence of Falls  Outcome: Ongoing (interventions implemented as appropriate)

## 2018-01-05 NOTE — H&P
Date of Hospital Visit:18  Encounter Provider: Elisa Bentley, RN  Place of Service: [facility]  Patient Name: Jaqueline Rojas  :1924  Referral Provider: Dayne Castro MD    Chief complaint: palpitations    History of Present Illness: Ms. Rojas is a 89 y/o patient of mine with a history of SVT, PVCs and mitral insufficiency. I last saw her in office on 17 for f/u mitral insufficiency and palpitations. At that time she had c/o occasional palpitations and I recommended increasing her BB dose. She did not want to do this, so we decided to continue to watch. Her last echo was done in 2016 and showed an EF of 55%, grade I diastolic dysfunction and mod-severe MR. She also wore a HM at that time which showed 20% of her beats were PVCs and 1-33 minute-episode of SVT with a rate of 188. She was started on metoprolol at that time, which was switched to coreg due to intolerance.    She presented to the ED yesterday with c/o rapid palpitations, dyspnea and cough. On arrival her bp was 79/56, . Labs showed a bun/creat of 15/0.88, neg troponin, and normal WBC. CXR showed undererated lungs with bibasilar opacities. EKG showed SVT, rate of 159, with ST depression. Post 6mg adenosine her rate dropped in the 90s. The plan was to double her Coreg and she was d/c to home.    Early this am she returned to the ED with the return of palpitations, rate of 150, bp 116/94. Labs showed a normal CBC/CMP, trop 0.048 and TSH of 4.46 with a free T4 of 1.05. She was again given adenosine with success. She is being admitted for further evaluation.    This morning, at 6:45am,she went back to ST rate of 150. Her home Coreg dose is 6.25mg bid.     Cardiac Testing:  Echo 2016  · Calculated EF = 55%  · All left ventricular wall segments contract normally.  · Left ventricular function is normal.  · Left ventricular diastolic dysfunction (grade I a) consistent with impaired  relaxation.  · Moderate-to-severe mitral valve regurgitation is present  · Mild tricuspid valve regurgitation is present.  · Estimated right ventricular systolic pressure from tricuspid regurgitation is normal (<35 mmHg).  Event Monitor 8/2016  Event Monitor  Event monitor enrollment date was 8/3/2016. Enrollment ended on 8/7/2016. Data was sent to the physician on 8/12/2016. The patient underwent continuous telemetry monitoring for 3 days and 21 hours.The underlying rhythm was sinus rhythm, heart rate  bpm, average heart rate 83 bpm. There was one episode of supraventricular tachycardia that lasted 33 minutes with an average heart rate of 188 8/m. This was symptomatic. It is a long R-P tachycardia without evidence of pre-excitation, and is narrow complex.There were very frequent premature ventricular contractions. 20.5% of the total heartbeats were PVCs. These also frequently occurred in couplets, which were multifocal.Interpretation:Markedly abnormal rhythm monitor with numerous PVC's and an episode of sustained SVT.   She has had a cold sore taken one over-the-counter dose of cough syrup metastases Patel.  No fevers or chills.  No other symptoms and no other changes in her medications.    Past Medical History:   Diagnosis Date   • CANCINO (dyspnea on exertion)    • Frequent PVCs     multifocal   • Irregular heart beat    • Knee injury    • Mitral regurgitation     Moderate to Severe per Echocardiogram 8/2016   • Palpitations    • Supraventricular tachycardia    • Wrist fracture        Past Surgical History:   Procedure Laterality Date   • MASTOID SURGERY     • SKIN GRAFT     • WRIST SURGERY         No current facility-administered medications on file prior to encounter.      Current Outpatient Prescriptions on File Prior to Encounter   Medication Sig Dispense Refill   • aspirin 81 MG EC tablet Take 81 mg by mouth Daily.     • carvedilol (COREG) 3.125 MG tablet Take 2 tablets by mouth 2 (Two) Times a Day With  "Meals. 180 tablet 0   • Multiple Vitamin (MULTIVITAMIN) tablet Take 1 tablet by mouth daily.         Social History     Social History   • Marital status:      Spouse name: N/A   • Number of children: N/A   • Years of education: N/A     Occupational History   • Not on file.     Social History Main Topics   • Smoking status: Never Smoker   • Smokeless tobacco: Never Used   • Alcohol use No   • Drug use: No   • Sexual activity: Not on file     Other Topics Concern   • Not on file     Social History Narrative   • No narrative on file       Family History   Problem Relation Age of Onset   • Heart disease Mother    • Diabetes Mother        REVIEW OF SYSTEMS:   All ROS was performed and is Negative except as outlined in HPI    Objective:     Vitals:    01/05/18 0042 01/05/18 0147 01/05/18 0233 01/05/18 0612   BP: 109/89 125/77 128/72    Pulse: 87 73 64    Resp:       Temp:       TempSrc:       SpO2: 100% 99% 98%    Weight:    59.4 kg (130 lb 14.4 oz)   Height:         Body mass index is 22.47 kg/(m^2).  Flowsheet Rows         First Filed Value    Admission Height  162.6 cm (64\") Documented at 01/05/2018 0023    Admission Weight  61.2 kg (135 lb) Documented at 01/05/2018 0023          Physical Exam   Physical Exam   Constitutional: She is oriented to person, place, and time. She appears well-developed and well-nourished. No distress.   HENT:   Head: Normocephalic.   Eyes: Conjunctivae are normal. Pupils are equal, round, and reactive to light. No scleral icterus.   Neck: Normal carotid pulses, no hepatojugular reflux and no JVD present. Carotid bruit is not present. No tracheal deviation, no edema and no erythema present. No thyromegaly present.   Cardiovascular: Normal rate, regular rhythm, S1 normal, S2 normal, normal heart sounds and intact distal pulses.   No extrasystoles are present. PMI is not displaced.  Exam reveals no gallop, no distant heart sounds and no friction rub.    No murmur heard.  Pulses:       " Carotid pulses are 2+ on the right side, and 2+ on the left side.       Radial pulses are 2+ on the right side, and 2+ on the left side.        Femoral pulses are 2+ on the right side, and 2+ on the left side.       Dorsalis pedis pulses are 2+ on the right side, and 2+ on the left side.        Posterior tibial pulses are 2+ on the right side, and 2+ on the left side.   Pulmonary/Chest: Effort normal and breath sounds normal. No respiratory distress. She has no decreased breath sounds. She has no wheezes. She has no rhonchi. She has no rales. She exhibits no tenderness.   Abdominal: Soft. Bowel sounds are normal. She exhibits no distension and no mass. There is no hepatosplenomegaly. There is no tenderness. There is no rebound and no guarding.   Musculoskeletal: She exhibits no edema, tenderness or deformity.   Neurological: She is alert and oriented to person, place, and time.   Skin: Skin is warm and dry. No rash noted. She is not diaphoretic. No cyanosis or erythema. No pallor. Nails show no clubbing.   Psychiatric: She has a normal mood and affect. Her speech is normal and behavior is normal. Judgment and thought content normal.       Lab Review:                  Results from last 7 days  Lab Units 01/05/18  0041   SODIUM mmol/L 139   POTASSIUM mmol/L 4.0   CHLORIDE mmol/L 102   CO2 mmol/L 26.6   BUN mg/dL 14   CREATININE mg/dL 0.69   GLUCOSE mg/dL 112*   CALCIUM mg/dL 8.7       Results from last 7 days  Lab Units 01/05/18  0041 01/04/18  0418   TROPONIN T ng/mL 0.048* <0.010       Results from last 7 days  Lab Units 01/04/18  0418   WBC 10*3/mm3 8.92   HEMOGLOBIN g/dL 12.2   HEMATOCRIT % 38.6   PLATELETS 10*3/mm3 209               Results from last 7 days  Lab Units 01/04/18  0418   MAGNESIUM mg/dL 2.4*               I personally viewed and interpreted the patient's EKG/Telemetry data    Assessment:   1. Recurrent supraventricular  tachycardia.  Not sure what else to add.  We'll ask arrhythmia service to  see.  2.  History of mitral insufficiency.  Last check was moderate to severe but she's not interested in any further intervention for this.  3.  PVCs.  4.  Recent cold but is been very minor.        Plan:

## 2018-01-05 NOTE — PLAN OF CARE
Problem: Patient Care Overview (Adult)  Goal: Plan of Care Review  Outcome: Ongoing (interventions implemented as appropriate)   01/05/18 0513 01/05/18 0852 01/05/18 1058   Coping/Psychosocial Response Interventions   Plan Of Care Reviewed With --  patient --    Patient Care Overview   Progress no change --  --    Outcome Evaluation   Outcome Summary/Follow up Plan --  --  pt had rhythm chg this am. chgd to a-flutter but converted back to sinus shortly after. consult put in for specialist. waiting for them to see pt. pt NPO until then. Will continue to monitor     Goal: Adult Individualization and Mutuality  Outcome: Ongoing (interventions implemented as appropriate)   01/05/18 1058   Individualization   Patient Specific Goals no falls, no pain, vss       Problem: Cardiac Output, Decreased (Adult)  Goal: Adequate Cardiac Output/Effective Tissue Perfusion  Outcome: Ongoing (interventions implemented as appropriate)   01/05/18 1058   Cardiac Output, Decreased (Adult)   Adequate Cardiac Output/Effective Tissue Perfusion making progress toward outcome       Problem: Pain, Acute (Adult)  Goal: Acceptable Pain Control/Comfort Level  Outcome: Ongoing (interventions implemented as appropriate)   01/05/18 1058   Pain, Acute (Adult)   Acceptable Pain Control/Comfort Level making progress toward outcome       Problem: Fall Risk (Adult)  Goal: Absence of Falls  Outcome: Ongoing (interventions implemented as appropriate)   01/05/18 1058   Fall Risk (Adult)   Absence of Falls making progress toward outcome

## 2018-01-06 VITALS
OXYGEN SATURATION: 97 % | WEIGHT: 130 LBS | RESPIRATION RATE: 18 BRPM | BODY MASS INDEX: 22.2 KG/M2 | DIASTOLIC BLOOD PRESSURE: 73 MMHG | HEART RATE: 58 BPM | SYSTOLIC BLOOD PRESSURE: 141 MMHG | TEMPERATURE: 98.6 F | HEIGHT: 64 IN

## 2018-01-06 PROCEDURE — 99217 PR OBSERVATION CARE DISCHARGE MANAGEMENT: CPT | Performed by: INTERNAL MEDICINE

## 2018-01-06 PROCEDURE — G0378 HOSPITAL OBSERVATION PER HR: HCPCS

## 2018-01-06 RX ORDER — CARVEDILOL 6.25 MG/1
6.25 TABLET ORAL 2 TIMES DAILY WITH MEALS
Qty: 60 TABLET | Refills: 11 | Status: SHIPPED | OUTPATIENT
Start: 2018-01-06 | End: 2018-01-23 | Stop reason: DRUGHIGH

## 2018-01-06 RX ADMIN — ASPIRIN 81 MG: 81 TABLET ORAL at 10:09

## 2018-01-06 RX ADMIN — Medication 1 TABLET: at 10:10

## 2018-01-06 RX ADMIN — CARVEDILOL 6.25 MG: 6.25 TABLET, FILM COATED ORAL at 10:10

## 2018-01-06 NOTE — PLAN OF CARE
Problem: Patient Care Overview (Adult)  Goal: Plan of Care Review  Outcome: Ongoing (interventions implemented as appropriate)   01/06/18 0506   Coping/Psychosocial Response Interventions   Plan Of Care Reviewed With patient   Patient Care Overview   Progress improving   Outcome Evaluation   Outcome Summary/Follow up Plan Pt. rhythm has remain normal sinus. All other vital signs stable. No problems noted at this time. Will continue to monitor.       Problem: Cardiac Output, Decreased (Adult)  Goal: Adequate Cardiac Output/Effective Tissue Perfusion  Outcome: Ongoing (interventions implemented as appropriate)   01/06/18 0506   Cardiac Output, Decreased (Adult)   Adequate Cardiac Output/Effective Tissue Perfusion making progress toward outcome       Problem: Pain, Acute (Adult)  Goal: Acceptable Pain Control/Comfort Level  Outcome: Ongoing (interventions implemented as appropriate)   01/06/18 0506   Pain, Acute (Adult)   Acceptable Pain Control/Comfort Level making progress toward outcome       Problem: Fall Risk (Adult)  Goal: Absence of Falls  Outcome: Ongoing (interventions implemented as appropriate)   01/06/18 0506   Fall Risk (Adult)   Absence of Falls making progress toward outcome

## 2018-01-08 NOTE — PROGRESS NOTES
Case Management Discharge Note    Final Note: Home no additional dc orders noted. Kevin rn/ccp    Discharge Placement     No information found             Discharge Codes: 01  Discharge to home

## 2018-01-23 ENCOUNTER — OFFICE VISIT (OUTPATIENT)
Dept: CARDIOLOGY | Facility: CLINIC | Age: 83
End: 2018-01-23

## 2018-01-23 VITALS
WEIGHT: 125 LBS | HEIGHT: 64 IN | DIASTOLIC BLOOD PRESSURE: 54 MMHG | SYSTOLIC BLOOD PRESSURE: 136 MMHG | HEART RATE: 62 BPM | BODY MASS INDEX: 21.34 KG/M2

## 2018-01-23 DIAGNOSIS — R00.2 PALPITATIONS: ICD-10-CM

## 2018-01-23 DIAGNOSIS — I47.1 PSVT (PAROXYSMAL SUPRAVENTRICULAR TACHYCARDIA) (HCC): Primary | ICD-10-CM

## 2018-01-23 PROCEDURE — 93000 ELECTROCARDIOGRAM COMPLETE: CPT | Performed by: NURSE PRACTITIONER

## 2018-01-23 PROCEDURE — 99213 OFFICE O/P EST LOW 20 MIN: CPT | Performed by: NURSE PRACTITIONER

## 2018-01-23 RX ORDER — CARVEDILOL 3.12 MG/1
6.25 TABLET ORAL 2 TIMES DAILY WITH MEALS
Qty: 360 TABLET | Refills: 3 | Status: SHIPPED | OUTPATIENT
Start: 2018-01-23 | End: 2019-03-25 | Stop reason: DRUGHIGH

## 2018-01-23 NOTE — PROGRESS NOTES
"Date of Office Visit: 2018  Encounter Provider: PAULIE Paula  Place of Service: [facility] CARDIOLOGY  Patient Name: Jaqueline Rojas  :1924    Chief Complaint   Patient presents with   • Rapid Heart Rate     PSVT   :   .  HPI: Jaqueline Rojas is a 89 y.o. female who is a patient of Dr. Reyes's that Dr. Schafer and I saw when she presented to the hospital earlier this month with recurrent SVT. We discussed ablation but given her age both her and us would like to try and manage conservatively. We increased her beta blocker and she presents today for routine follow up.     She has not had any episodes of SVT. She always has some \"skips and jumps\" and she has had those but not sustained heart racing episodes. She does think she feels more fatigued on the increased dose of beta blocker.           Past Medical History:   Diagnosis Date   • CANCINO (dyspnea on exertion)    • Frequent PVCs     multifocal   • Irregular heart beat    • Knee injury    • Mitral regurgitation     Moderate to Severe per Echocardiogram 2016   • Palpitations    • Supraventricular tachycardia    • Wrist fracture        Past Surgical History:   Procedure Laterality Date   • MASTOID SURGERY     • SKIN GRAFT     • WRIST SURGERY         Social History     Social History   • Marital status:      Spouse name: N/A   • Number of children: N/A   • Years of education: N/A     Occupational History   • Not on file.     Social History Main Topics   • Smoking status: Never Smoker   • Smokeless tobacco: Never Used   • Alcohol use No   • Drug use: No   • Sexual activity: Not on file     Other Topics Concern   • Not on file     Social History Narrative       Family History   Problem Relation Age of Onset   • Heart disease Mother    • Diabetes Mother        Review of Systems   Constitution: Positive for malaise/fatigue. Negative for chills, fever, weight gain and weight loss.   HENT: Negative for ear pain, " "hearing loss, nosebleeds and sore throat.    Eyes: Negative for double vision, pain and visual disturbance.   Cardiovascular: Positive for dyspnea on exertion, leg swelling (ankles at times) and palpitations. Negative for chest pain, irregular heartbeat, near-syncope, orthopnea, paroxysmal nocturnal dyspnea and syncope.   Respiratory: Positive for cough. Negative for shortness of breath, sleep disturbances due to breathing, snoring and wheezing.    Endocrine: Negative for cold intolerance, heat intolerance and polyuria.   Skin: Negative for itching and rash.   Musculoskeletal: Positive for joint pain and joint swelling. Negative for myalgias.   Gastrointestinal: Negative for abdominal pain, diarrhea, melena, nausea and vomiting.   Genitourinary: Negative for frequency, hematuria and hesitancy.   Neurological: Negative for excessive daytime sleepiness, headaches, light-headedness, numbness, paresthesias and seizures.   Psychiatric/Behavioral: Negative for altered mental status and depression.   Allergic/Immunologic: Negative.    All other systems reviewed and are negative.      No Known Allergies      Current Outpatient Prescriptions:   •  aspirin 81 MG EC tablet, Take 81 mg by mouth Daily., Disp: , Rfl:   •  Multiple Vitamin (MULTIVITAMIN) tablet, Take 1 tablet by mouth daily., Disp: , Rfl:   •  carvedilol (COREG) 3.125 MG tablet, Take 2 tablets by mouth 2 (Two) Times a Day With Meals., Disp: 360 tablet, Rfl: 3     Objective:     Vitals:    01/23/18 0952   BP: 136/54   Pulse: 62   Weight: 56.7 kg (125 lb)   Height: 162.6 cm (64.02\")     Body mass index is 21.45 kg/(m^2).    PHYSICAL EXAM:    Vitals Reviewed.   General Appearance: No acute distress.  Eyes: Conjunctiva and lids: No erythema, swelling, or discharge. Sclera non-icteric.   HENT: Atraumatic, normocephalic. External eyes, ears, and nose normal.   Respiratory: No signs of respiratory distress. Respiration rhythm and depth normal.   Clear to auscultation. No " rales, crackles, rhonchi, or wheezing auscultated.   Cardiovascular:  Jugular Venous Pressure: Normal  Heart Rate and Rhythm: Normal, Heart Sounds: Normal S1 and S2. No S3 or S4 noted.  Murmurs: No murmurs noted. No rubs, thrills, or gallops.   Arterial Pulses:  Posterior tibialis and dorsalis pedis pulses normal.   Lower Extremities: No edema noted.  Gastrointestinal:  Abdomen soft, non-distended, non-tender.   Musculoskeletal: Normal movement of extremities  Skin and Nails: General appearance normal. No pallor, cyanosis, diaphoresis. Skin temperature normal. No clubbing of fingernails.   Psychiatric: Patient alert and oriented to person, place, and time. Speech and behavior appropriate. Normal mood and affect.       ECG 12 Lead  Date/Time: 1/23/2018 10:44 AM  Performed by: PITER MOORE  Authorized by: PITER MOORE   Comparison: compared with previous ECG from 1/5/2018  Similar to previous ECG  Rhythm: sinus rhythm  Ectopy: PVCs              Assessment:       Diagnosis Plan   1. PSVT (paroxysmal supraventricular tachycardia)     2. Palpitations            Plan:       1. PSVT. No recurrence on higher dose of BB so far. She complains of fatigue since increasing the dose. Most of her SVT episodes have been at night. I told her she could try taking the higher dose at night time and her original lower dose of 3.125mg in the morning to see if that helped her fatigue. We discussed that she could take extra doses if she had increased palpitations or an episode and we talked about how to do that.    2. Palpitations. She has PVC's and has always had these. They are unchanged.    Return to see Dr. Reyes in 6 months and EP as needed.     As always, it has been a pleasure to participate in your patient's care.      Sincerely,         PAULIE Hendrickson

## 2018-07-24 ENCOUNTER — OFFICE VISIT (OUTPATIENT)
Dept: CARDIOLOGY | Facility: CLINIC | Age: 83
End: 2018-07-24

## 2018-07-24 VITALS
BODY MASS INDEX: 21.68 KG/M2 | WEIGHT: 127 LBS | DIASTOLIC BLOOD PRESSURE: 64 MMHG | HEART RATE: 55 BPM | SYSTOLIC BLOOD PRESSURE: 124 MMHG | HEIGHT: 64 IN

## 2018-07-24 DIAGNOSIS — I49.3 PVC (PREMATURE VENTRICULAR CONTRACTION): ICD-10-CM

## 2018-07-24 DIAGNOSIS — I47.1 PSVT (PAROXYSMAL SUPRAVENTRICULAR TACHYCARDIA) (HCC): Primary | ICD-10-CM

## 2018-07-24 DIAGNOSIS — I34.0 NON-RHEUMATIC MITRAL REGURGITATION: ICD-10-CM

## 2018-07-24 PROCEDURE — 93000 ELECTROCARDIOGRAM COMPLETE: CPT | Performed by: INTERNAL MEDICINE

## 2018-07-24 PROCEDURE — 99214 OFFICE O/P EST MOD 30 MIN: CPT | Performed by: INTERNAL MEDICINE

## 2018-07-24 NOTE — PROGRESS NOTES
Date of Office Visit: 18  Encounter Provider: Chai Reyes MD  Place of Service: [facility] CARDIOLOGY  Patient Name: Jaqueline Rojas  :1924  Referral Provider:No ref. provider found      Chief Complaint   Patient presents with   • Rapid Heart Rate     History of Present Illness  Mrs Rojas is a pleasant 89-year-old female who has a history of frequent PVCs were Holter in the past that her PVCs accounting for 24% of her recorded time including triplets and couplets.  But then she also had these episodes of where she had rapid heart beating and she wore an ZIO Patch which revealed an episode of supraventricular tachycardia at a rate of 190 bpm it lasted 33 minutes.  And again frequent PVCs accounting for 20% of her total heartbeats.  She was started on metoprolol 12-1/2 mg twice a day.  She wasn't really tolerating the metoprolol also she was switched to carvedilol.  She then had an echocardiogram that showed normal left ventricular chamber size and function.  Moderate to severe mitral insufficiency.  Mild tricuspid insufficiency normal RV systolic pressure.    She came back in the hospital in 2018 with some SVT she was seen by arrhythmia service felt to be best to stop her beta blocker which she did she comes in for follow-up she's had no recurrent episodes of palpitations.  She gets some shortness of breath with activity but that's been unchanged.  Some lower extremity edema again that's been unchanged.  No near syncope or syncope.  No falling.  No stroke type symptoms no blood in her stool or black tarry stools.      Palpitations    Associated symptoms include malaise/fatigue. Pertinent negatives include no diaphoresis, dizziness, fever, nausea, numbness, vomiting or weakness.         Past Medical History:   Diagnosis Date   • CANCINO (dyspnea on exertion)    • Frequent PVCs     multifocal   • Irregular heart beat    • Knee injury    • Mitral regurgitation      Moderate to Severe per Echocardiogram 8/2016   • Palpitations    • Supraventricular tachycardia (CMS/HCC)    • Wrist fracture          Past Surgical History:   Procedure Laterality Date   • MASTOID SURGERY     • SKIN GRAFT     • WRIST SURGERY           Current Outpatient Prescriptions on File Prior to Visit   Medication Sig Dispense Refill   • aspirin 81 MG EC tablet Take 81 mg by mouth Daily.     • carvedilol (COREG) 3.125 MG tablet Take 2 tablets by mouth 2 (Two) Times a Day With Meals. 360 tablet 3   • Multiple Vitamin (MULTIVITAMIN) tablet Take 1 tablet by mouth daily.       No current facility-administered medications on file prior to visit.          Social History     Social History   • Marital status:      Spouse name: N/A   • Number of children: N/A   • Years of education: N/A     Occupational History   • Not on file.     Social History Main Topics   • Smoking status: Never Smoker   • Smokeless tobacco: Never Used   • Alcohol use No   • Drug use: No   • Sexual activity: Not on file     Other Topics Concern   • Not on file     Social History Narrative   • No narrative on file       Family History   Problem Relation Age of Onset   • Heart disease Mother    • Diabetes Mother          Review of Systems   Constitution: Positive for malaise/fatigue. Negative for decreased appetite, diaphoresis, fever, weakness, weight gain and weight loss.   HENT: Negative for congestion, hearing loss, nosebleeds and tinnitus.    Eyes: Negative for blurred vision, double vision, vision loss in left eye, vision loss in right eye and visual disturbance.   Cardiovascular:        As noted in HPI   Respiratory:        As noted HPI   Endocrine: Negative for cold intolerance and heat intolerance.   Hematologic/Lymphatic: Negative for bleeding problem. Does not bruise/bleed easily.   Skin: Negative for color change, flushing, itching and rash.   Musculoskeletal: Positive for joint pain. Negative for arthritis, back pain, joint  "swelling, muscle weakness and myalgias.   Gastrointestinal: Negative for bloating, abdominal pain, constipation, diarrhea, dysphagia, heartburn, hematemesis, hematochezia, melena, nausea and vomiting.   Genitourinary: Negative for bladder incontinence, dysuria, frequency, nocturia and urgency.   Neurological: Negative for dizziness, focal weakness, headaches, light-headedness, loss of balance, numbness, paresthesias and vertigo.   Psychiatric/Behavioral: Negative for depression, memory loss and substance abuse.       Procedures      ECG 12 Lead  Date/Time: 7/24/2018 10:44 AM  Performed by: TREMAYNE DEE  Authorized by: TREMAYNE DEE   Comparison: compared with previous ECG   Similar to previous ECG  Ectopy: unifocal PVCs  Rate: normal  QRS axis: left                  Objective:    /64 (BP Location: Left arm)   Pulse 55   Ht 162.6 cm (64\")   Wt 57.6 kg (127 lb)   BMI 21.80 kg/m²        Physical Exam  Physical Exam   Constitutional: She is oriented to person, place, and time. She appears well-developed and well-nourished. No distress.   HENT:   Head: Normocephalic.   Eyes: Pupils are equal, round, and reactive to light. Conjunctivae are normal. No scleral icterus.   Neck: Normal carotid pulses, no hepatojugular reflux and no JVD present. Carotid bruit is not present. No tracheal deviation, no edema and no erythema present. No thyromegaly present.   Cardiovascular: Normal rate, regular rhythm, S1 normal, S2 normal and intact distal pulses.   No extrasystoles are present. PMI is not displaced.  Exam reveals no gallop, no distant heart sounds and no friction rub.    Murmur heard.   Systolic murmur is present with a grade of 1/6  at the apex  Pulses:       Carotid pulses are 2+ on the right side, and 2+ on the left side.       Radial pulses are 2+ on the right side, and 2+ on the left side.        Femoral pulses are 2+ on the right side, and 2+ on the left side.       Dorsalis pedis pulses are 2+ on the " right side, and 2+ on the left side.        Posterior tibial pulses are 2+ on the right side, and 2+ on the left side.   Pulmonary/Chest: Effort normal and breath sounds normal. No respiratory distress. She has no decreased breath sounds. She has no wheezes. She has no rhonchi. She has no rales. She exhibits no tenderness.   Abdominal: Soft. Bowel sounds are normal. She exhibits no distension and no mass. There is no hepatosplenomegaly. There is no tenderness. There is no rebound and no guarding.   Musculoskeletal: She exhibits no edema, tenderness or deformity.   Neurological: She is alert and oriented to person, place, and time.   Skin: Skin is warm and dry. No rash noted. She is not diaphoretic. No cyanosis or erythema. No pallor. Nails show no clubbing.   Psychiatric: She has a normal mood and affect. Her speech is normal and behavior is normal. Judgment and thought content normal.           Assessment:   1. 89-year-old female with occasional episodes of supraventricular tachycardia.  Stable on current regimen continue the same.  2. PVCs unchanged.  3.  Mitral insufficiency. Moderate to severe on echocardiogram performed in August 2015. She has really not been interested in the valve intervention and she is asymptomatic with normal LV chamber size will continue the same. She is to see us in follow-up in one year   4.  Probable jugular left ankle vascular follow-up with her PCP about that.         Plan:

## 2018-08-15 RX ORDER — CARVEDILOL 3.12 MG/1
TABLET ORAL
Qty: 180 TABLET | Refills: 2 | Status: SHIPPED | OUTPATIENT
Start: 2018-08-15 | End: 2019-03-25 | Stop reason: SDUPTHER

## 2018-11-05 ENCOUNTER — TELEPHONE (OUTPATIENT)
Dept: CARDIOLOGY | Facility: CLINIC | Age: 83
End: 2018-11-05

## 2018-11-05 NOTE — TELEPHONE ENCOUNTER
[phone]    Patient called and stated she has a bad case of diarrhea.  Would it be okay to take Pepto or Kaopectate?  She is on carvedilol 6.25 bid and asa 81    Thank you,  Matlide

## 2018-11-06 ENCOUNTER — HOSPITAL ENCOUNTER (EMERGENCY)
Facility: HOSPITAL | Age: 83
Discharge: HOME | End: 2018-11-06
Attending: EMERGENCY MEDICINE | Admitting: EMERGENCY MEDICINE

## 2018-11-06 VITALS
BODY MASS INDEX: 20.08 KG/M2 | HEIGHT: 64 IN | TEMPERATURE: 97.9 F | SYSTOLIC BLOOD PRESSURE: 153 MMHG | WEIGHT: 117.6 LBS | OXYGEN SATURATION: 96 % | HEART RATE: 78 BPM | RESPIRATION RATE: 17 BRPM | DIASTOLIC BLOOD PRESSURE: 80 MMHG

## 2018-11-06 DIAGNOSIS — E86.0 MILD DEHYDRATION: ICD-10-CM

## 2018-11-06 DIAGNOSIS — R19.7 DIARRHEA, UNSPECIFIED TYPE: Primary | ICD-10-CM

## 2018-11-06 LAB
ALBUMIN SERPL-MCNC: 3.3 G/DL (ref 3.5–5.2)
ALBUMIN/GLOB SERPL: 1 G/DL
ALP SERPL-CCNC: 49 U/L (ref 39–117)
ALT SERPL W P-5'-P-CCNC: 13 U/L (ref 1–33)
ANION GAP SERPL CALCULATED.3IONS-SCNC: 13 MMOL/L
AST SERPL-CCNC: 15 U/L (ref 1–32)
BACTERIA UR QL AUTO: ABNORMAL /HPF
BASOPHILS # BLD AUTO: 0.01 10*3/MM3 (ref 0–0.2)
BASOPHILS NFR BLD AUTO: 0.2 % (ref 0–1.5)
BILIRUB SERPL-MCNC: 0.4 MG/DL (ref 0.1–1.2)
BILIRUB UR QL STRIP: NEGATIVE
BUN BLD-MCNC: 12 MG/DL (ref 8–23)
BUN/CREAT SERPL: 16.9 (ref 7–25)
CALCIUM SPEC-SCNC: 8.9 MG/DL (ref 8.2–9.6)
CHLORIDE SERPL-SCNC: 102 MMOL/L (ref 98–107)
CLARITY UR: ABNORMAL
CO2 SERPL-SCNC: 22 MMOL/L (ref 22–29)
COLOR UR: ABNORMAL
CREAT BLD-MCNC: 0.71 MG/DL (ref 0.57–1)
DEPRECATED RDW RBC AUTO: 42.9 FL (ref 37–54)
EOSINOPHIL # BLD AUTO: 0.08 10*3/MM3 (ref 0–0.7)
EOSINOPHIL NFR BLD AUTO: 1.5 % (ref 0.3–6.2)
ERYTHROCYTE [DISTWIDTH] IN BLOOD BY AUTOMATED COUNT: 13.2 % (ref 11.7–13)
GFR SERPL CREATININE-BSD FRML MDRD: 77 ML/MIN/1.73
GLOBULIN UR ELPH-MCNC: 3.4 GM/DL
GLUCOSE BLD-MCNC: 115 MG/DL (ref 65–99)
GLUCOSE UR STRIP-MCNC: NEGATIVE MG/DL
HCT VFR BLD AUTO: 34.3 % (ref 35.6–45.5)
HGB BLD-MCNC: 11.5 G/DL (ref 11.9–15.5)
HGB UR QL STRIP.AUTO: NEGATIVE
HYALINE CASTS UR QL AUTO: ABNORMAL /LPF
IMM GRANULOCYTES # BLD: 0 10*3/MM3 (ref 0–0.03)
IMM GRANULOCYTES NFR BLD: 0 % (ref 0–0.5)
KETONES UR QL STRIP: ABNORMAL
LEUKOCYTE ESTERASE UR QL STRIP.AUTO: ABNORMAL
LYMPHOCYTES # BLD AUTO: 0.92 10*3/MM3 (ref 0.9–4.8)
LYMPHOCYTES NFR BLD AUTO: 16.7 % (ref 19.6–45.3)
MCH RBC QN AUTO: 30.2 PG (ref 26.9–32)
MCHC RBC AUTO-ENTMCNC: 33.5 G/DL (ref 32.4–36.3)
MCV RBC AUTO: 90 FL (ref 80.5–98.2)
MONOCYTES # BLD AUTO: 0.9 10*3/MM3 (ref 0.2–1.2)
MONOCYTES NFR BLD AUTO: 16.3 % (ref 5–12)
MUCOUS THREADS URNS QL MICRO: ABNORMAL /HPF
NEUTROPHILS # BLD AUTO: 3.6 10*3/MM3 (ref 1.9–8.1)
NEUTROPHILS NFR BLD AUTO: 65.3 % (ref 42.7–76)
NITRITE UR QL STRIP: NEGATIVE
PH UR STRIP.AUTO: 6 [PH] (ref 5–8)
PLATELET # BLD AUTO: 200 10*3/MM3 (ref 140–500)
PMV BLD AUTO: 8.8 FL (ref 6–12)
POTASSIUM BLD-SCNC: 3.3 MMOL/L (ref 3.5–5.2)
PROT SERPL-MCNC: 6.7 G/DL (ref 6–8.5)
PROT UR QL STRIP: ABNORMAL
RBC # BLD AUTO: 3.81 10*6/MM3 (ref 3.9–5.2)
RBC # UR: ABNORMAL /HPF
REF LAB TEST METHOD: ABNORMAL
SODIUM BLD-SCNC: 137 MMOL/L (ref 136–145)
SP GR UR STRIP: 1.02 (ref 1–1.03)
SQUAMOUS #/AREA URNS HPF: ABNORMAL /HPF
UROBILINOGEN UR QL STRIP: ABNORMAL
WBC NRBC COR # BLD: 5.51 10*3/MM3 (ref 4.5–10.7)
WBC UR QL AUTO: ABNORMAL /HPF

## 2018-11-06 PROCEDURE — 81001 URINALYSIS AUTO W/SCOPE: CPT | Performed by: NURSE PRACTITIONER

## 2018-11-06 PROCEDURE — 80053 COMPREHEN METABOLIC PANEL: CPT | Performed by: NURSE PRACTITIONER

## 2018-11-06 PROCEDURE — 99284 EMERGENCY DEPT VISIT MOD MDM: CPT

## 2018-11-06 PROCEDURE — 96360 HYDRATION IV INFUSION INIT: CPT

## 2018-11-06 PROCEDURE — 85025 COMPLETE CBC W/AUTO DIFF WBC: CPT | Performed by: NURSE PRACTITIONER

## 2018-11-06 RX ORDER — SODIUM CHLORIDE 0.9 % (FLUSH) 0.9 %
10 SYRINGE (ML) INJECTION AS NEEDED
Status: DISCONTINUED | OUTPATIENT
Start: 2018-11-06 | End: 2018-11-06 | Stop reason: HOSPADM

## 2018-11-06 RX ORDER — ONDANSETRON 2 MG/ML
4 INJECTION INTRAMUSCULAR; INTRAVENOUS ONCE
Status: DISCONTINUED | OUTPATIENT
Start: 2018-11-06 | End: 2018-11-06 | Stop reason: HOSPADM

## 2018-11-06 RX ADMIN — SODIUM CHLORIDE 500 ML: 9 INJECTION, SOLUTION INTRAVENOUS at 15:37

## 2018-11-06 NOTE — ED PROVIDER NOTES
EMERGENCY DEPARTMENT ENCOUNTER    Room Number:    Date seen:  2018  Time seen: 3:17 PM  PCP: Provider, No Known    HPI:  Chief complaint:dirrhea, weakness  Context:Jaqueline Rojas is a 89 y.o. female who presents to the ED with c/o 3-4 days of diarrhea.  Last episode was once this am.  She has had weakness due to poor appetite.  She states she drank a protein drink that was , but she said it tasted and smelled ok.  Denies n/v, CP or SOA.  No ill contacts.  Was sent here from [facility] for dehydration.    Timing:ceased  Duration: 3-4 days  Location:abdomen  Radiation:no  Quality:diarrhea  Intensity/Severity:mild  Associated Symptoms:diarrhea alone  Aggravating Factors:no  Alleviating Factors:no  Previous Episodes:no  Treatment before arrival:[facility] visit    MEDICAL RECORD REVIEW      ALLERGIES  Patient has no known allergies.    PAST MEDICAL HISTORY  Active Ambulatory Problems     Diagnosis Date Noted   • Irregular heart rate 2016   • Shortness of breath on exertion 2016   • Supraventricular tachycardia (CMS/HCC) 2016   • PVC (premature ventricular contraction) 2016   • PSVT (paroxysmal supraventricular tachycardia) (CMS/[facility]) 2018   • Palpitations 2018     Resolved Ambulatory Problems     Diagnosis Date Noted   • No Resolved Ambulatory Problems     Past Medical History:   Diagnosis Date   • CANCINO (dyspnea on exertion)    • Frequent PVCs    • Irregular heart beat    • Knee injury    • Mitral regurgitation    • Palpitations    • Supraventricular tachycardia (CMS/[facility])    • Wrist fracture        PAST SURGICAL HISTORY  Past Surgical History:   Procedure Laterality Date   • MASTOID SURGERY     • SKIN GRAFT     • WRIST SURGERY         FAMILY HISTORY  Family History   Problem Relation Age of Onset   • Heart disease Mother    • Diabetes Mother        SOCIAL HISTORY  Social History     Social History   • Marital status:      Spouse name: N/A   • Number of children: N/A   • Years  of education: N/A     Occupational History   • Not on file.     Social History Main Topics   • Smoking status: Never Smoker   • Smokeless tobacco: Never Used   • Alcohol use No   • Drug use: No   • Sexual activity: Not on file     Other Topics Concern   • Not on file     Social History Narrative   • No narrative on file       REVIEW OF SYSTEMS  Review of Systems   Constitutional: Positive for appetite change. Negative for activity change, diaphoresis and fever.   HENT: Negative for trouble swallowing.    Eyes: Negative for visual disturbance.   Respiratory: Negative for cough, chest tightness, shortness of breath and wheezing.    Cardiovascular: Negative for chest pain, palpitations and leg swelling.   Gastrointestinal: Positive for diarrhea. Negative for abdominal pain, nausea and vomiting.   Genitourinary: Negative for difficulty urinating, dysuria, flank pain and frequency.   Musculoskeletal: Negative for back pain.   Skin: Negative for rash.   Neurological: Negative for dizziness, speech difficulty, light-headedness and headaches.       PHYSICAL EXAM  ED Triage Vitals   Temp Heart Rate Resp BP SpO2   11/06/18 1433 11/06/18 1433 11/06/18 1433 11/06/18 1450 11/06/18 1433   97.9 °F (36.6 °C) 86 18 124/75 99 %      Temp src Heart Rate Source Patient Position BP Location FiO2 (%)   11/06/18 1433 -- 11/06/18 1450 -- --   Tympanic  Sitting       Physical Exam   Constitutional: She is oriented to person, place, and time and well-developed, well-nourished, and in no distress. She appears not dehydrated. She appears healthy.  Non-toxic appearance. She does not have a sickly appearance. No distress.   HENT:   Head: Normocephalic and atraumatic.   Mouth/Throat: Uvula is midline and mucous membranes are normal.   Neck: Normal range of motion. Neck supple.   Cardiovascular: Normal rate, regular rhythm, S1 normal, S2 normal and normal heart sounds.  Exam reveals no gallop and no friction rub.    No murmur  heard.  Pulmonary/Chest: Effort normal and breath sounds normal. She has no decreased breath sounds. She has no wheezes. She has no rhonchi. She has no rales.   Abdominal: Soft. Normal appearance. There is no tenderness. There is no rebound and no guarding.   Soft rounded abdomen   Musculoskeletal: Normal range of motion.   Neurological: She is alert and oriented to person, place, and time. Gait normal. GCS score is 15.   Skin: Skin is warm, dry and intact.   Psychiatric: Affect and judgment normal.   Nursing note and vitals reviewed.      LAB RESULTS  Recent Results (from the past 24 hour(s))   Comprehensive Metabolic Panel    Collection Time: 11/06/18  3:37 PM   Result Value Ref Range    Glucose 115 (H) 65 - 99 mg/dL    BUN 12 8 - 23 mg/dL    Creatinine 0.71 0.57 - 1.00 mg/dL    Sodium 137 136 - 145 mmol/L    Potassium 3.3 (L) 3.5 - 5.2 mmol/L    Chloride 102 98 - 107 mmol/L    CO2 22.0 22.0 - 29.0 mmol/L    Calcium 8.9 8.2 - 9.6 mg/dL    Total Protein 6.7 6.0 - 8.5 g/dL    Albumin 3.30 (L) 3.50 - 5.20 g/dL    ALT (SGPT) 13 1 - 33 U/L    AST (SGOT) 15 1 - 32 U/L    Alkaline Phosphatase 49 39 - 117 U/L    Total Bilirubin 0.4 0.1 - 1.2 mg/dL    eGFR Non African Amer 77 >60 mL/min/1.73    Globulin 3.4 gm/dL    A/G Ratio 1.0 g/dL    BUN/Creatinine Ratio 16.9 7.0 - 25.0    Anion Gap 13.0 mmol/L   CBC Auto Differential    Collection Time: 11/06/18  3:37 PM   Result Value Ref Range    WBC 5.51 4.50 - 10.70 10*3/mm3    RBC 3.81 (L) 3.90 - 5.20 10*6/mm3    Hemoglobin 11.5 (L) 11.9 - 15.5 g/dL    Hematocrit 34.3 (L) 35.6 - 45.5 %    MCV 90.0 80.5 - 98.2 fL    MCH 30.2 26.9 - 32.0 pg    MCHC 33.5 32.4 - 36.3 g/dL    RDW 13.2 (H) 11.7 - 13.0 %    RDW-SD 42.9 37.0 - 54.0 fl    MPV 8.8 6.0 - 12.0 fL    Platelets 200 140 - 500 10*3/mm3    Neutrophil % 65.3 42.7 - 76.0 %    Lymphocyte % 16.7 (L) 19.6 - 45.3 %    Monocyte % 16.3 (H) 5.0 - 12.0 %    Eosinophil % 1.5 0.3 - 6.2 %    Basophil % 0.2 0.0 - 1.5 %    Immature Grans % 0.0  0.0 - 0.5 %    Neutrophils, Absolute 3.60 1.90 - 8.10 10*3/mm3    Lymphocytes, Absolute 0.92 0.90 - 4.80 10*3/mm3    Monocytes, Absolute 0.90 0.20 - 1.20 10*3/mm3    Eosinophils, Absolute 0.08 0.00 - 0.70 10*3/mm3    Basophils, Absolute 0.01 0.00 - 0.20 10*3/mm3    Immature Grans, Absolute 0.00 0.00 - 0.03 10*3/mm3   Urinalysis With Microscopic If Indicated (No Culture) - Urine, Clean Catch    Collection Time: 11/06/18  4:11 PM   Result Value Ref Range    Color, UA Dark Yellow (A) Yellow, Straw    Appearance, UA Cloudy (A) Clear    pH, UA 6.0 5.0 - 8.0    Specific Gravity, UA 1.025 1.005 - 1.030    Glucose, UA Negative Negative    Ketones, UA 40 mg/dL (2+) (A) Negative    Bilirubin, UA Negative Negative    Blood, UA Negative Negative    Protein,  mg/dL (2+) (A) Negative    Leuk Esterase, UA Moderate (2+) (A) Negative    Nitrite, UA Negative Negative    Urobilinogen, UA 0.2 E.U./dL 0.2 - 1.0 E.U./dL   Urinalysis, Microscopic Only - Urine, Clean Catch    Collection Time: 11/06/18  4:11 PM   Result Value Ref Range    RBC, UA 0-2 None Seen, 0-2 /HPF    WBC, UA 21-30 (A) None Seen, 0-2 /HPF    Bacteria, UA None Seen None Seen /HPF    Squamous Epithelial Cells, UA 0-2 None Seen, 0-2 /HPF    Hyaline Casts, UA 13-20 None Seen /LPF    Mucus, UA Moderate/2+ (A) None Seen, Trace /HPF    Methodology Manual Light Microscopy        I ordered the above labs and reviewed the results    RADIOLOGY  No orders to display           MEDICATIONS GIVEN IN ER  Medications   sodium chloride 0.9 % flush 10 mL (not administered)   ondansetron (ZOFRAN) injection 4 mg (4 mg Intravenous Not Given 11/6/18 1538)   sodium chloride 0.9 % bolus 500 mL (0 mL Intravenous Stopped 11/6/18 1706)       EKG  Interpreted by ED Physician    PROCEDURES  Procedures    COURSE & MEDICAL DECISION MAKING  Pertinent Labs and Imaging studies that were ordered and reviewed are noted above.  Results were reviewed/discussed with the patient and they were also made  "aware of online access.  Pt also made aware that some labs, such as cultures, will not be resulted during ER visit and follow up with PMD is necessary.     PROGRESS AND CONSULTS    Progress Notes:         1645: Reviewed pt's history and workup with Dr. Trejo.  After a bedside evaluation, Dr. Trejo agrees with the plan of care.    1650: Updated patient and her family member on labs.  She is doing well without complaints.  The patient's history, physical exam, and lab findings were discussed with the physician, who also performed a face to face history and physical exam.  I discussed all results and noted any abnormalities with patient.  Discussed absoute need to recheck abnormalities with their family physician.  I answered any of the patient's questions.  Discussed plan for discharge, as there is no emergent indication for admission.  Pt is agreeable and understands need for follow up and repeat testing.  Pt is aware that discharge does not mean that nothing is wrong but it indicates no emergency is present and they must continue care with their family physician.  Pt is discharged with instructions to follow up with primary care doctor to have their blood pressure rechecked.       Disposition vitals:  /80   Pulse 78   Temp 97.9 °F (36.6 °C) (Tympanic)   Resp 17   Ht 162.6 cm (64\")   Wt 53.3 kg (117 lb 9.6 oz)   SpO2 96%   BMI 20.19 kg/m²       DIAGNOSIS  Final diagnoses:   Diarrhea, unspecified type   Mild dehydration       FOLLOW UP   Your Primary Care Provider            RX     Medication List      No changes were made to your prescriptions during this visit.            Tiffany Quinonez, APRN  11/06/18 1712    "

## 2018-11-06 NOTE — ED NOTES
Pt asked if could provide urine sample at this time. Pt states she has not has anything to drink today and feels as if she would not be able to urinate at this time. Pt requesting fluid administration then she will use restroom to provide urine sample.      Pam Lee RN  11/06/18 5826

## 2018-11-06 NOTE — ED PROVIDER NOTES
Pt presents to the ED c/o diarrhea for 3-4 days. Pt had 1 episode today. Pt denies abd pain, hematochezia, fever, and vomiting. Pt states she drank and  protein shake.    On exam,   Pt A&Ox3  Abd is soft, non tender, non distended. I am unable to illicit tenderness with palpation.     5:00 PM  Informed pt of labs and discussed plan to discharge. Pt understands and agrees with the plan, all questions answered.     Attestation:  The DANIEL and I have discussed this patient's history, physical exam, and treatment plan.  I have reviewed the documentation and personally had a face to face interaction with the patient. I affirm the documentation and agree with the treatment and plan.  The attached note describes my personal findings.      Documentation assistance provided by lisseth Young for Dr. Trejo. Information recorded by the scribe was done at my direction and has been verified and validated by me.     Mami Young  [phone]       Marcell Trejo MD  [phone]

## 2018-11-06 NOTE — ED NOTES
Pt reports diarrhea x3-4 days worsening yesterday. Pt states she was seen at [facility] and told to come to ED for further evaluation. Pt denies abd pain, recent antibiotic use and any other s/s at time of ED assessment.      Pam Lee, RN  11/06/18 3738

## 2019-02-13 NOTE — DISCHARGE SUMMARY
Patient Name: Jaqueline Rojas  :1924  89 y.o.    Date of Admit: 2018  Date of Discharge:  2018    Discharge Diagnosis:  Problem List Items Addressed This Visit        Cardiovascular and Mediastinum    PSVT (paroxysmal supraventricular tachycardia) - Primary    Relevant Medications    carvedilol (COREG) 6.25 MG tablet          Hospital Course:   This is a nice lady who follows with Dr. Torres.  She presented with SVT in the past.  She was started on metoprolol which was switched to carvedilol for intolerance.  She was back in the emergency room on  with more SVT.  She had more SVT and was treated with adenosine while hospitalized.  Dr. Schafer was consulted and they are going to try an increased dose of Coreg with a plan for ablation if this is not successful.           Consults     Date and Time Order Name Status Description    2018 0737 Inpatient Consult to Cardiac Electrophysiologist      2018 0039 LCG (on-call MD unless specified) Completed     2018 0515 LCG (on-call MD unless specified) Completed           Pertinent Test Results:     Results from last 7 days  Lab Units 18  0818  0418   SODIUM mmol/L  --  139 136   POTASSIUM mmol/L 3.9 4.0 4.1   CHLORIDE mmol/L  --  102 98   CO2 mmol/L  --  26.6 29.0   BUN mg/dL  --  14 15   CREATININE mg/dL  --  0.69 0.88   CALCIUM mg/dL  --  8.7 9.0   BILIRUBIN mg/dL  --   --  0.4   ALK PHOS U/L  --   --  61   ALT (SGPT) U/L  --   --  16   AST (SGOT) U/L  --   --  21   GLUCOSE mg/dL  --  112* 158*       Results from last 7 days  Lab Units 18  0813 18  00418  0418   TROPONIN T ng/mL 0.051* 0.048* <0.010       Results from last 7 days  Lab Units 18  0418   WBC 10*3/mm3 8.92   HEMOGLOBIN g/dL 12.2   HEMATOCRIT % 38.6   PLATELETS 10*3/mm3 209           Results from last 7 days  Lab Units 18  0813   MAGNESIUM mg/dL 2.1           Condition on Discharge: stable    Discharge  Medications   Jaqueline Rojas   Home Medication Instructions DAVID:[id number]    Printed on:01/06/18 9637   Medication Information                      aspirin 81 MG EC tablet  Take 81 mg by mouth Daily.             carvedilol (COREG) 6.25 MG tablet  Take 1 tablet by mouth 2 (Two) Times a Day With Meals.             Multiple Vitamin (MULTIVITAMIN) tablet  Take 1 tablet by mouth daily.                 Discharge Diet:   Diet Instructions     Diet: Regular; Thin       Discharge Diet:  Regular   Fluid Consistency:  Thin                 Activity at Discharge:   Activity Instructions     Activity as Tolerated                     Discharge disposition: home    Follow-up Appointments  No future appointments.  Additional Instructions for the Follow-ups that You Need to Schedule     Discharge Follow-up with Specified Provider: Delores in 2 weeks    As directed    To:  Delores in 2 weeks                        Elida Castaneda MD, [facility]  01/06/18  11:38 AM    Time: Discharge 25 min     Attending Attestation (For Attendings USE Only)...

## 2019-03-25 RX ORDER — CARVEDILOL 6.25 MG/1
6.25 TABLET ORAL 2 TIMES DAILY WITH MEALS
Qty: 90 TABLET | Refills: 1 | Status: SHIPPED | OUTPATIENT
Start: 2019-03-25 | End: 2019-03-31 | Stop reason: SDUPTHER

## 2019-04-01 RX ORDER — CARVEDILOL 6.25 MG/1
TABLET ORAL
Qty: 180 TABLET | Refills: 2 | Status: SHIPPED | OUTPATIENT
Start: 2019-04-01 | End: 2019-07-25 | Stop reason: DRUGHIGH

## 2019-07-25 ENCOUNTER — OFFICE VISIT (OUTPATIENT)
Dept: CARDIOLOGY | Facility: CLINIC | Age: 84
End: 2019-07-25

## 2019-07-25 VITALS
HEART RATE: 63 BPM | BODY MASS INDEX: 21.17 KG/M2 | SYSTOLIC BLOOD PRESSURE: 130 MMHG | WEIGHT: 124 LBS | HEIGHT: 64 IN | DIASTOLIC BLOOD PRESSURE: 80 MMHG

## 2019-07-25 DIAGNOSIS — I47.1 PSVT (PAROXYSMAL SUPRAVENTRICULAR TACHYCARDIA) (HCC): Primary | ICD-10-CM

## 2019-07-25 DIAGNOSIS — I34.0 NON-RHEUMATIC MITRAL REGURGITATION: ICD-10-CM

## 2019-07-25 DIAGNOSIS — I49.3 PVC (PREMATURE VENTRICULAR CONTRACTION): ICD-10-CM

## 2019-07-25 PROCEDURE — 99213 OFFICE O/P EST LOW 20 MIN: CPT | Performed by: INTERNAL MEDICINE

## 2019-07-25 PROCEDURE — 93000 ELECTROCARDIOGRAM COMPLETE: CPT | Performed by: INTERNAL MEDICINE

## 2019-07-25 RX ORDER — CARVEDILOL 3.12 MG/1
3.12 TABLET ORAL 2 TIMES DAILY WITH MEALS
COMMUNITY
End: 2019-09-30 | Stop reason: SDUPTHER

## 2019-07-25 NOTE — PROGRESS NOTES
Date of Office Visit: 19  Encounter Provider: Chai Reyes MD  Place of Service: [facility] CARDIOLOGY  Patient Name: Jaqueline Rojas  :1924  Referral Provider:Referring, Self      No chief complaint on file.    History of Present Illness  Mrs Rojas is a pleasant 89-year-old female who has a history of frequent PVCs were Holter in the past that her PVCs accounting for 24% of her recorded time including triplets and couplets.  But then she also had these episodes of where she had rapid heart beating and she wore an ZIO Patch which revealed an episode of supraventricular tachycardia at a rate of 190 bpm it lasted 33 minutes.  And again frequent PVCs accounting for 20% of her total heartbeats.  She was started on metoprolol 12-1/2 mg twice a day.  She wasn't really tolerating the metoprolol also she was switched to carvedilol.  She then had an echocardiogram that showed normal left ventricular chamber size and function.  Moderate to severe mitral insufficiency.  Mild tricuspid insufficiency normal RV systolic pressure.    She came back in the hospital in 2018 with some SVT she was seen by arrhythmia service felt to be best to stop her beta blocker which she did. She comes in for follow-up.  Been doing well she gets some shortness of breath with activity but it is intermittent no orthopnea or PND.  She walks 20 minutes in the house every day she is mainly limited in activity by her ankle.  No orthopnea or PND no palpitations no near syncope or syncope.  Overall she feels like she is doing well.  She still lives at home with her daughter.      Palpitations    Associated symptoms include malaise/fatigue. Pertinent negatives include no diaphoresis, dizziness, fever, nausea, numbness, vomiting or weakness.         Past Medical History:   Diagnosis Date   • CANCINO (dyspnea on exertion)    • Frequent PVCs     multifocal   • Irregular heart beat    • Knee injury    • Mitral  regurgitation     Moderate to Severe per Echocardiogram 8/2016   • Palpitations    • Supraventricular tachycardia (CMS/HCC)    • Wrist fracture          Past Surgical History:   Procedure Laterality Date   • MASTOID SURGERY     • SKIN GRAFT     • WRIST SURGERY           Current Outpatient Medications on File Prior to Visit   Medication Sig Dispense Refill   • aspirin 81 MG EC tablet Take 81 mg by mouth Daily.     • carvedilol (COREG) 6.25 MG tablet TAKE ONE TABLET BY MOUTH TWICE A DAY WITH MEALS 180 tablet 2   • Multiple Vitamin (MULTIVITAMIN) tablet Take 1 tablet by mouth daily.       No current facility-administered medications on file prior to visit.          Social History     Socioeconomic History   • Marital status:      Spouse name: Not on file   • Number of children: Not on file   • Years of education: Not on file   • Highest education level: Not on file   Tobacco Use   • Smoking status: Never Smoker   • Smokeless tobacco: Never Used   Substance and Sexual Activity   • Alcohol use: No   • Drug use: No       Family History   Problem Relation Age of Onset   • Heart disease Mother    • Diabetes Mother          Review of Systems   Constitution: Positive for malaise/fatigue. Negative for decreased appetite, diaphoresis, fever, weakness, weight gain and weight loss.   HENT: Negative for congestion, hearing loss, nosebleeds and tinnitus.    Eyes: Negative for blurred vision, double vision, vision loss in left eye, vision loss in right eye and visual disturbance.   Cardiovascular:        As noted in HPI   Respiratory:        As noted HPI   Endocrine: Negative for cold intolerance and heat intolerance.   Hematologic/Lymphatic: Negative for bleeding problem. Does not bruise/bleed easily.   Skin: Negative for color change, flushing, itching and rash.   Musculoskeletal: Negative for arthritis, back pain, joint pain, joint swelling, muscle weakness and myalgias.   Gastrointestinal: Negative for bloating, abdominal  pain, constipation, diarrhea, dysphagia, heartburn, hematemesis, hematochezia, melena, nausea and vomiting.   Genitourinary: Negative for bladder incontinence, dysuria, frequency, nocturia and urgency.   Neurological: Negative for dizziness, focal weakness, headaches, light-headedness, loss of balance, numbness, paresthesias and vertigo.   Psychiatric/Behavioral: Negative for depression, memory loss and substance abuse.       Procedures      ECG 12 Lead  Date/Time: 7/25/2019 10:47 AM  Performed by: Chai Reyes MD  Authorized by: Chai Reyes MD   Comparison: compared with previous ECG   Similar to previous ECG  Rhythm: sinus rhythm  Rate: normal  QRS axis: left                  Objective:    There were no vitals taken for this visit.       Physical Exam  Physical Exam   Constitutional: She is oriented to person, place, and time. She appears well-developed and well-nourished. No distress.   HENT:   Head: Normocephalic.   Eyes: Conjunctivae are normal. Pupils are equal, round, and reactive to light. No scleral icterus.   Neck: Normal carotid pulses, no hepatojugular reflux and no JVD present. Carotid bruit is not present. No tracheal deviation, no edema and no erythema present. No thyromegaly present.   Cardiovascular: Normal rate, regular rhythm, S1 normal, S2 normal, normal heart sounds and intact distal pulses.  No extrasystoles are present. PMI is not displaced. Exam reveals no gallop, no distant heart sounds and no friction rub.   No murmur heard.  Pulses:       Carotid pulses are 2+ on the right side, and 2+ on the left side.       Radial pulses are 2+ on the right side, and 2+ on the left side.        Femoral pulses are 2+ on the right side, and 2+ on the left side.       Dorsalis pedis pulses are 2+ on the right side, and 2+ on the left side.        Posterior tibial pulses are 2+ on the right side, and 2+ on the left side.   Pulmonary/Chest: Effort normal and breath sounds normal. No respiratory  distress. She has no decreased breath sounds. She has no wheezes. She has no rhonchi. She has no rales. She exhibits no tenderness.   Abdominal: Soft. Bowel sounds are normal. She exhibits no distension and no mass. There is no hepatosplenomegaly. There is no tenderness. There is no rebound and no guarding.   Musculoskeletal: She exhibits no edema, tenderness or deformity.   Neurological: She is alert and oriented to person, place, and time.   Skin: Skin is warm and dry. No rash noted. She is not diaphoretic. No cyanosis or erythema. No pallor. Nails show no clubbing.   Psychiatric: She has a normal mood and affect. Her speech is normal and behavior is normal. Judgment and thought content normal.           Assessment:   1. 89-year-old female with occasional episodes of supraventricular tachycardia.  Stable on current regimen continue the same.  Continue the same see his skin follow-up in a year.  2. PVCs unchanged.  3.  Mitral insufficiency. Moderate to severe on echocardiogram performed in August 2015. She has really not been interested in the valve intervention and she is asymptomatic with normal LV chamber size will continue the same. She is to see us in follow-up in one year            Plan:

## 2019-09-30 RX ORDER — CARVEDILOL 3.12 MG/1
3.12 TABLET ORAL 2 TIMES DAILY
Qty: 180 TABLET | Refills: 2 | Status: SHIPPED | OUTPATIENT
Start: 2019-09-30 | End: 2019-12-23 | Stop reason: SDUPTHER

## 2019-09-30 RX ORDER — CARVEDILOL 3.12 MG/1
TABLET ORAL
Qty: 180 TABLET | Refills: 0 | Status: SHIPPED | OUTPATIENT
Start: 2019-09-30 | End: 2019-09-30 | Stop reason: SDUPTHER

## 2019-12-23 RX ORDER — CARVEDILOL 3.12 MG/1
3.12 TABLET ORAL 2 TIMES DAILY
Qty: 180 TABLET | Refills: 2 | Status: SHIPPED | OUTPATIENT
Start: 2019-12-23 | End: 2019-12-23

## 2019-12-23 RX ORDER — CARVEDILOL 3.12 MG/1
TABLET ORAL
Qty: 180 TABLET | Refills: 0 | Status: SHIPPED | OUTPATIENT
Start: 2019-12-23 | End: 2020-02-02 | Stop reason: SDUPTHER

## 2019-12-30 ENCOUNTER — HOSPITAL ENCOUNTER (EMERGENCY)
Facility: HOSPITAL | Age: 84
Discharge: HOME | End: 2019-12-30
Attending: EMERGENCY MEDICINE | Admitting: EMERGENCY MEDICINE

## 2019-12-30 VITALS
TEMPERATURE: 98.8 F | RESPIRATION RATE: 16 BRPM | HEART RATE: 81 BPM | DIASTOLIC BLOOD PRESSURE: 66 MMHG | OXYGEN SATURATION: 93 % | SYSTOLIC BLOOD PRESSURE: 104 MMHG | HEIGHT: 64 IN | BODY MASS INDEX: 21.28 KG/M2

## 2019-12-30 DIAGNOSIS — I47.1 PSVT (PAROXYSMAL SUPRAVENTRICULAR TACHYCARDIA) (HCC): Primary | ICD-10-CM

## 2019-12-30 LAB
ALBUMIN SERPL-MCNC: 3.5 G/DL (ref 3.5–5.2)
ALBUMIN/GLOB SERPL: 1.1 G/DL
ALP SERPL-CCNC: 56 U/L (ref 39–117)
ALT SERPL W P-5'-P-CCNC: 12 U/L (ref 1–33)
ANION GAP SERPL CALCULATED.3IONS-SCNC: 12.7 MMOL/L (ref 5–15)
APTT PPP: 26.2 SECONDS (ref 22.7–35.4)
AST SERPL-CCNC: 22 U/L (ref 1–32)
BASOPHILS # BLD AUTO: 0.04 10*3/MM3 (ref 0–0.2)
BASOPHILS NFR BLD AUTO: 0.5 % (ref 0–1.5)
BILIRUB SERPL-MCNC: 0.3 MG/DL (ref 0.2–1.2)
BUN BLD-MCNC: 14 MG/DL (ref 8–23)
BUN/CREAT SERPL: 17.5 (ref 7–25)
CALCIUM SPEC-SCNC: 8.9 MG/DL (ref 8.2–9.6)
CHLORIDE SERPL-SCNC: 100 MMOL/L (ref 98–107)
CO2 SERPL-SCNC: 24.3 MMOL/L (ref 22–29)
CREAT BLD-MCNC: 0.8 MG/DL (ref 0.57–1)
DEPRECATED RDW RBC AUTO: 43.6 FL (ref 37–54)
EOSINOPHIL # BLD AUTO: 0.3 10*3/MM3 (ref 0–0.4)
EOSINOPHIL NFR BLD AUTO: 3.8 % (ref 0.3–6.2)
ERYTHROCYTE [DISTWIDTH] IN BLOOD BY AUTOMATED COUNT: 13.3 % (ref 12.3–15.4)
GFR SERPL CREATININE-BSD FRML MDRD: 67 ML/MIN/1.73
GLOBULIN UR ELPH-MCNC: 3.1 GM/DL
GLUCOSE BLD-MCNC: 154 MG/DL (ref 65–99)
HCT VFR BLD AUTO: 36.3 % (ref 34–46.6)
HGB BLD-MCNC: 12.1 G/DL (ref 12–15.9)
IMM GRANULOCYTES # BLD AUTO: 0.03 10*3/MM3 (ref 0–0.05)
IMM GRANULOCYTES NFR BLD AUTO: 0.4 % (ref 0–0.5)
INR PPP: 1.06 (ref 0.9–1.1)
LYMPHOCYTES # BLD AUTO: 2.1 10*3/MM3 (ref 0.7–3.1)
LYMPHOCYTES NFR BLD AUTO: 26.6 % (ref 19.6–45.3)
MAGNESIUM SERPL-MCNC: 2.2 MG/DL (ref 1.7–2.3)
MCH RBC QN AUTO: 29.5 PG (ref 26.6–33)
MCHC RBC AUTO-ENTMCNC: 33.3 G/DL (ref 31.5–35.7)
MCV RBC AUTO: 88.5 FL (ref 79–97)
MONOCYTES # BLD AUTO: 1.1 10*3/MM3 (ref 0.1–0.9)
MONOCYTES NFR BLD AUTO: 14 % (ref 5–12)
NEUTROPHILS # BLD AUTO: 4.31 10*3/MM3 (ref 1.7–7)
NEUTROPHILS NFR BLD AUTO: 54.7 % (ref 42.7–76)
NRBC BLD AUTO-RTO: 0 /100 WBC (ref 0–0.2)
PLATELET # BLD AUTO: 213 10*3/MM3 (ref 140–450)
PMV BLD AUTO: 9.3 FL (ref 6–12)
POTASSIUM BLD-SCNC: 4.3 MMOL/L (ref 3.5–5.2)
PROT SERPL-MCNC: 6.6 G/DL (ref 6–8.5)
PROTHROMBIN TIME: 13.5 SECONDS (ref 11.7–14.2)
RBC # BLD AUTO: 4.1 10*6/MM3 (ref 3.77–5.28)
SODIUM BLD-SCNC: 137 MMOL/L (ref 136–145)
TROPONIN T SERPL-MCNC: <0.01 NG/ML (ref 0–0.03)
WBC NRBC COR # BLD: 7.88 10*3/MM3 (ref 3.4–10.8)

## 2019-12-30 PROCEDURE — 85610 PROTHROMBIN TIME: CPT | Performed by: EMERGENCY MEDICINE

## 2019-12-30 PROCEDURE — 96374 THER/PROPH/DIAG INJ IV PUSH: CPT

## 2019-12-30 PROCEDURE — 80053 COMPREHEN METABOLIC PANEL: CPT | Performed by: EMERGENCY MEDICINE

## 2019-12-30 PROCEDURE — 84484 ASSAY OF TROPONIN QUANT: CPT | Performed by: EMERGENCY MEDICINE

## 2019-12-30 PROCEDURE — 99284 EMERGENCY DEPT VISIT MOD MDM: CPT

## 2019-12-30 PROCEDURE — 85730 THROMBOPLASTIN TIME PARTIAL: CPT | Performed by: EMERGENCY MEDICINE

## 2019-12-30 PROCEDURE — 83735 ASSAY OF MAGNESIUM: CPT | Performed by: EMERGENCY MEDICINE

## 2019-12-30 PROCEDURE — 25010000002 ADENOSINE PER 6 MG: Performed by: EMERGENCY MEDICINE

## 2019-12-30 PROCEDURE — 93010 ELECTROCARDIOGRAM REPORT: CPT | Performed by: INTERNAL MEDICINE

## 2019-12-30 PROCEDURE — 93005 ELECTROCARDIOGRAM TRACING: CPT

## 2019-12-30 PROCEDURE — 85025 COMPLETE CBC W/AUTO DIFF WBC: CPT | Performed by: EMERGENCY MEDICINE

## 2019-12-30 PROCEDURE — 93005 ELECTROCARDIOGRAM TRACING: CPT | Performed by: EMERGENCY MEDICINE

## 2019-12-30 RX ORDER — ADENOSINE 3 MG/ML
6 INJECTION, SOLUTION INTRAVENOUS ONCE
Status: COMPLETED | OUTPATIENT
Start: 2019-12-30 | End: 2019-12-30

## 2019-12-30 RX ORDER — SODIUM CHLORIDE 0.9 % (FLUSH) 0.9 %
10 SYRINGE (ML) INJECTION AS NEEDED
Status: DISCONTINUED | OUTPATIENT
Start: 2019-12-30 | End: 2019-12-30 | Stop reason: HOSPADM

## 2019-12-30 RX ORDER — ADENOSINE 3 MG/ML
12 INJECTION, SOLUTION INTRAVENOUS ONCE
Status: DISCONTINUED | OUTPATIENT
Start: 2019-12-30 | End: 2019-12-30 | Stop reason: HOSPADM

## 2019-12-30 RX ADMIN — ADENOSINE 6 MG: 3 INJECTION INTRAVENOUS at 03:14

## 2019-12-30 RX ADMIN — SODIUM CHLORIDE 1000 ML: 9 INJECTION, SOLUTION INTRAVENOUS at 03:12

## 2019-12-31 ENCOUNTER — TELEPHONE (OUTPATIENT)
Dept: CARDIOLOGY | Facility: CLINIC | Age: 84
End: 2019-12-31

## 2020-02-02 ENCOUNTER — HOSPITAL ENCOUNTER (EMERGENCY)
Facility: HOSPITAL | Age: 85
Discharge: HOME | End: 2020-02-02
Attending: EMERGENCY MEDICINE | Admitting: EMERGENCY MEDICINE

## 2020-02-02 ENCOUNTER — APPOINTMENT (OUTPATIENT)
Dept: GENERAL RADIOLOGY | Facility: HOSPITAL | Age: 85
End: 2020-02-02

## 2020-02-02 VITALS
HEART RATE: 87 BPM | WEIGHT: 120 LBS | OXYGEN SATURATION: 98 % | BODY MASS INDEX: 20.49 KG/M2 | TEMPERATURE: 99 F | HEIGHT: 64 IN | RESPIRATION RATE: 14 BRPM | DIASTOLIC BLOOD PRESSURE: 74 MMHG | SYSTOLIC BLOOD PRESSURE: 111 MMHG

## 2020-02-02 DIAGNOSIS — I48.0 PAROXYSMAL ATRIAL FIBRILLATION (HCC): Primary | ICD-10-CM

## 2020-02-02 LAB
ALBUMIN SERPL-MCNC: 4 G/DL (ref 3.5–5.2)
ALBUMIN/GLOB SERPL: 1.2 G/DL
ALP SERPL-CCNC: 69 U/L (ref 39–117)
ALT SERPL W P-5'-P-CCNC: 11 U/L (ref 1–33)
ANION GAP SERPL CALCULATED.3IONS-SCNC: 12.3 MMOL/L (ref 5–15)
AST SERPL-CCNC: 20 U/L (ref 1–32)
BASOPHILS # BLD AUTO: 0.07 10*3/MM3 (ref 0–0.2)
BASOPHILS NFR BLD AUTO: 1 % (ref 0–1.5)
BILIRUB SERPL-MCNC: 0.3 MG/DL (ref 0.2–1.2)
BUN BLD-MCNC: 24 MG/DL (ref 8–23)
BUN/CREAT SERPL: 26.4 (ref 7–25)
CALCIUM SPEC-SCNC: 9.1 MG/DL (ref 8.2–9.6)
CHLORIDE SERPL-SCNC: 103 MMOL/L (ref 98–107)
CO2 SERPL-SCNC: 22.7 MMOL/L (ref 22–29)
CREAT BLD-MCNC: 0.91 MG/DL (ref 0.57–1)
DEPRECATED RDW RBC AUTO: 42.1 FL (ref 37–54)
EOSINOPHIL # BLD AUTO: 0.36 10*3/MM3 (ref 0–0.4)
EOSINOPHIL NFR BLD AUTO: 5.1 % (ref 0.3–6.2)
ERYTHROCYTE [DISTWIDTH] IN BLOOD BY AUTOMATED COUNT: 13.4 % (ref 12.3–15.4)
GFR SERPL CREATININE-BSD FRML MDRD: 57 ML/MIN/1.73
GLOBULIN UR ELPH-MCNC: 3.4 GM/DL
GLUCOSE BLD-MCNC: 124 MG/DL (ref 65–99)
HCT VFR BLD AUTO: 38.5 % (ref 34–46.6)
HGB BLD-MCNC: 13.4 G/DL (ref 12–15.9)
IMM GRANULOCYTES # BLD AUTO: 0.03 10*3/MM3 (ref 0–0.05)
IMM GRANULOCYTES NFR BLD AUTO: 0.4 % (ref 0–0.5)
LYMPHOCYTES # BLD AUTO: 1.55 10*3/MM3 (ref 0.7–3.1)
LYMPHOCYTES NFR BLD AUTO: 21.8 % (ref 19.6–45.3)
MAGNESIUM SERPL-MCNC: 2.2 MG/DL (ref 1.7–2.3)
MCH RBC QN AUTO: 30.2 PG (ref 26.6–33)
MCHC RBC AUTO-ENTMCNC: 34.8 G/DL (ref 31.5–35.7)
MCV RBC AUTO: 86.7 FL (ref 79–97)
MONOCYTES # BLD AUTO: 0.7 10*3/MM3 (ref 0.1–0.9)
MONOCYTES NFR BLD AUTO: 9.8 % (ref 5–12)
NEUTROPHILS # BLD AUTO: 4.41 10*3/MM3 (ref 1.7–7)
NEUTROPHILS NFR BLD AUTO: 61.9 % (ref 42.7–76)
NRBC BLD AUTO-RTO: 0 /100 WBC (ref 0–0.2)
NT-PROBNP SERPL-MCNC: 561.3 PG/ML (ref 5–1800)
PLATELET # BLD AUTO: 240 10*3/MM3 (ref 140–450)
PMV BLD AUTO: 9.2 FL (ref 6–12)
POTASSIUM BLD-SCNC: 4.5 MMOL/L (ref 3.5–5.2)
PROT SERPL-MCNC: 7.4 G/DL (ref 6–8.5)
RBC # BLD AUTO: 4.44 10*6/MM3 (ref 3.77–5.28)
SODIUM BLD-SCNC: 138 MMOL/L (ref 136–145)
TROPONIN T SERPL-MCNC: <0.01 NG/ML (ref 0–0.03)
WBC NRBC COR # BLD: 7.12 10*3/MM3 (ref 3.4–10.8)

## 2020-02-02 PROCEDURE — 99284 EMERGENCY DEPT VISIT MOD MDM: CPT

## 2020-02-02 PROCEDURE — 93010 ELECTROCARDIOGRAM REPORT: CPT | Performed by: INTERNAL MEDICINE

## 2020-02-02 PROCEDURE — 93005 ELECTROCARDIOGRAM TRACING: CPT | Performed by: EMERGENCY MEDICINE

## 2020-02-02 PROCEDURE — 83880 ASSAY OF NATRIURETIC PEPTIDE: CPT | Performed by: EMERGENCY MEDICINE

## 2020-02-02 PROCEDURE — 71046 X-RAY EXAM CHEST 2 VIEWS: CPT

## 2020-02-02 PROCEDURE — 80053 COMPREHEN METABOLIC PANEL: CPT | Performed by: EMERGENCY MEDICINE

## 2020-02-02 PROCEDURE — 83735 ASSAY OF MAGNESIUM: CPT | Performed by: EMERGENCY MEDICINE

## 2020-02-02 PROCEDURE — 84484 ASSAY OF TROPONIN QUANT: CPT | Performed by: EMERGENCY MEDICINE

## 2020-02-02 PROCEDURE — 85025 COMPLETE CBC W/AUTO DIFF WBC: CPT | Performed by: EMERGENCY MEDICINE

## 2020-02-02 RX ORDER — CARVEDILOL 6.25 MG/1
6.25 TABLET ORAL 2 TIMES DAILY
Qty: 60 TABLET | Refills: 0 | Status: SHIPPED | OUTPATIENT
Start: 2020-02-02 | End: 2020-02-06 | Stop reason: SDUPTHER

## 2020-02-02 RX ORDER — SODIUM CHLORIDE 0.9 % (FLUSH) 0.9 %
10 SYRINGE (ML) INJECTION AS NEEDED
Status: DISCONTINUED | OUTPATIENT
Start: 2020-02-02 | End: 2020-02-02 | Stop reason: HOSPADM

## 2020-02-02 NOTE — ED NOTES
"Pt to this ER with elevated HR, had bent over and said that she felt \"funny\" said that after a few minutes, she realized it was her heart.   HR 94 now, RR 20 96%         Meron Castillo, RN  02/02/20 6280    "

## 2020-02-02 NOTE — ED PROVIDER NOTES
" EMERGENCY DEPARTMENT ENCOUNTER    CHIEF COMPLAINT  Chief Complaint: Palpitations  History given by: Pt  History limited by: None  Room Number: 02/02  PMD: Provider, No Known      HPI:  Pt is a 89 y.o. female with hx of afib (treats with 3.125mg carvedilol) who presents complaining of sudden onset episode of palpitations about an hour ago. Pt states \"I was bent over and suddenly had a weird feeling in my chest\". She also c/o shortness of breath, and chest discomfort but denies BLE pain/edema.  She admits to similar previous episodes. She has not other complaints at this time.    Duration:  1 hour ago  Onset: sudden   Location: cardiac  Radiation: none  Quality: rapid heart rate  Intensity/Severity: moderate  Progression: unchanged  Associated Symptoms: shortness of breath, chest discomfort  Aggravating Factors: none  Alleviating Factors: none  Previous Episodes: unspecified similar episodes in the past      PAST MEDICAL HISTORY  Active Ambulatory Problems     Diagnosis Date Noted   • Irregular heart rate 02/04/2016   • Shortness of breath on exertion 02/05/2016   • Supraventricular tachycardia (CMS/[facility]) 08/17/2016   • PVC (premature ventricular contraction) 08/17/2016   • PSVT (paroxysmal supraventricular tachycardia) (CMS/[facility]) 01/05/2018   • Palpitations 01/23/2018     Resolved Ambulatory Problems     Diagnosis Date Noted   • No Resolved Ambulatory Problems     Past Medical History:   Diagnosis Date   • CANCINO (dyspnea on exertion)    • Frequent PVCs    • Irregular heart beat    • Knee injury    • Mitral regurgitation    • Wrist fracture        PAST SURGICAL HISTORY  Past Surgical History:   Procedure Laterality Date   • MASTOID SURGERY     • SKIN GRAFT     • WRIST SURGERY         FAMILY HISTORY  Family History   Problem Relation Age of Onset   • Heart disease Mother    • Diabetes Mother        SOCIAL HISTORY  Social History     Socioeconomic History   • Marital status:      Spouse name: Not on file   • Number " of children: Not on file   • Years of education: Not on file   • Highest education level: Not on file   Tobacco Use   • Smoking status: Never Smoker   • Smokeless tobacco: Never Used   Substance and Sexual Activity   • Alcohol use: No   • Drug use: No   Lifestyle   • Physical activity:     Days per week: 7 days     Minutes per session: 20 min   • Stress: Not on file       ALLERGIES  Patient has no known allergies.    REVIEW OF SYSTEMS  Review of Systems   Constitutional: Negative for fever.   HENT: Negative for sore throat.    Eyes: Negative.    Respiratory: Positive for shortness of breath. Negative for cough.    Cardiovascular: Positive for chest pain ('discomfort') and palpitations. Negative for leg swelling.   Gastrointestinal: Negative for abdominal pain, diarrhea and vomiting.   Genitourinary: Negative for dysuria.   Musculoskeletal: Negative for neck pain.   Skin: Negative for rash.   Allergic/Immunologic: Negative.    Neurological: Negative for weakness, numbness and headaches.   Hematological: Negative.    Psychiatric/Behavioral: Negative.    All other systems reviewed and are negative.      PHYSICAL EXAM  ED Triage Vitals   Temp Heart Rate Resp BP SpO2   02/02/20 1605 02/02/20 1605 02/02/20 1605 02/02/20 1612 02/02/20 1605   99 °F (37.2 °C) (!) 178 16 123/78 96 %      Temp src Heart Rate Source Patient Position BP Location FiO2 (%)   02/02/20 1605 02/02/20 1605 02/02/20 1614 02/02/20 1614 --   Tympanic Monitor Lying Right arm          Physical Exam   Constitutional: She is oriented to person, place, and time. No distress.   HENT:   Head: Normocephalic and atraumatic.   Eyes: Pupils are equal, round, and reactive to light. EOM are normal.   Neck: Normal range of motion. Neck supple. No thyromegaly present.   Cardiovascular: Normal rate, regular rhythm and normal heart sounds.   No murmur heard.  Pulmonary/Chest: Effort normal. No respiratory distress. She has rhonchi in the right lower field and the left  lower field.   Abdominal: Soft. Normal appearance. She exhibits no distension. There is no tenderness. There is no rebound and no guarding.   Musculoskeletal: Normal range of motion. She exhibits no edema or tenderness.   Lymphadenopathy:     She has no cervical adenopathy.   Neurological: She is alert and oriented to person, place, and time. She has normal sensation and normal strength.   Skin: Skin is warm and dry. No rash noted.   Psychiatric: Mood and affect normal.   Nursing note and vitals reviewed.      LAB RESULTS  Lab Results (last 24 hours)     Procedure Component Value Units Date/Time    CBC & Differential [[id number]] Collected:  02/02/20 1625    Specimen:  Blood Updated:  02/02/20 1646    Narrative:       The following orders were created for panel order CBC & Differential.  Procedure                               Abnormality         Status                     ---------                               -----------         ------                     CBC Auto Differential[[id number]]        Normal              Final result                 Please view results for these tests on the individual orders.    Comprehensive Metabolic Panel [[id number]]  (Abnormal) Collected:  02/02/20 1625    Specimen:  Blood Updated:  02/02/20 1701     Glucose 124 mg/dL      BUN 24 mg/dL      Creatinine 0.91 mg/dL      Sodium 138 mmol/L      Potassium 4.5 mmol/L      Chloride 103 mmol/L      CO2 22.7 mmol/L      Calcium 9.1 mg/dL      Total Protein 7.4 g/dL      Albumin 4.00 g/dL      ALT (SGPT) 11 U/L      AST (SGOT) 20 U/L      Alkaline Phosphatase 69 U/L      Total Bilirubin 0.3 mg/dL      eGFR Non African Amer 57 mL/min/1.73      Globulin 3.4 gm/dL      A/G Ratio 1.2 g/dL      BUN/Creatinine Ratio 26.4     Anion Gap 12.3 mmol/L     Narrative:       GFR Normal >60  Chronic Kidney Disease <60  Kidney Failure <15      Troponin [[id number]]  (Normal) Collected:  02/02/20 1625    Specimen:  Blood Updated:  02/02/20 1702     Troponin T  <0.010 ng/mL     Narrative:       Troponin T Reference Range:  <= 0.03 ng/mL-   Negative for AMI  >0.03 ng/mL-     Abnormal for myocardial necrosis.  Clinicians would have to utilize clinical acumen, EKG, Troponin and serial changes to determine if it is an Acute Myocardial Infarction or myocardial injury due to an underlying chronic condition.       Results may be falsely decreased if patient taking Biotin.      Magnesium [[id number]]  (Normal) Collected:  02/02/20 1625    Specimen:  Blood Updated:  02/02/20 1701     Magnesium 2.2 mg/dL     BNP [[id number]]  (Normal) Collected:  02/02/20 1625    Specimen:  Blood Updated:  02/02/20 1702     proBNP 561.3 pg/mL     Narrative:       Among patients with dyspnea, NT-proBNP is highly sensitive for the detection of acute congestive heart failure. In addition NT-proBNP of <300 pg/ml effectively rules out acute congestive heart failure with 99% negative predictive value.    Results may be falsely decreased if patient taking Biotin.      CBC Auto Differential [[id number]]  (Normal) Collected:  02/02/20 1625    Specimen:  Blood Updated:  02/02/20 1646     WBC 7.12 10*3/mm3      RBC 4.44 10*6/mm3      Hemoglobin 13.4 g/dL      Hematocrit 38.5 %      MCV 86.7 fL      MCH 30.2 pg      MCHC 34.8 g/dL      RDW 13.4 %      RDW-SD 42.1 fl      MPV 9.2 fL      Platelets 240 10*3/mm3      Neutrophil % 61.9 %      Lymphocyte % 21.8 %      Monocyte % 9.8 %      Eosinophil % 5.1 %      Basophil % 1.0 %      Immature Grans % 0.4 %      Neutrophils, Absolute 4.41 10*3/mm3      Lymphocytes, Absolute 1.55 10*3/mm3      Monocytes, Absolute 0.70 10*3/mm3      Eosinophils, Absolute 0.36 10*3/mm3      Basophils, Absolute 0.07 10*3/mm3      Immature Grans, Absolute 0.03 10*3/mm3      nRBC 0.0 /100 WBC           I ordered the above labs and reviewed the results    RADIOLOGY  XR Chest 2 View   Final Result   No focal pulmonary consolidation. Follow-up as clinical   indications persist.       This  report was finalized on 2/2/2020 4:56 PM by Dr. Elias Samaniego M.D.               I ordered the above noted radiological studies. Interpreted by radiologist. Reviewed by me in PACS.       PROCEDURES  Procedures    EKG          EKG time: 1610  Rhythm/Rate: SR 98 with occasional PVC  P waves and DC: Nml  QRS, axis: LAD   ST and T waves: Nml     Interpreted Contemporaneously by me, independently viewed  Unchanged compared to prior 12/30/2019    PROGRESS AND CONSULTS     1614: Ordered XR chest and labs for further evaluation.    1707: Rechecked pt who is resting comfortably. Informed pt on negative lab results and discussed plan to discharge with increased prescription for carvedilol. Pt understands and agrees with the plan, all questions answered.      MEDICAL DECISION MAKING  Results were reviewed/discussed with the patient and they were also made aware of online access. Pt also made aware that some labs, such as cultures, will not be resulted during ER visit and follow up with PMD is necessary.     MDM  Number of Diagnoses or Management Options     Amount and/or Complexity of Data Reviewed  Clinical lab tests: ordered and reviewed  Tests in the radiology section of CPT®: ordered and reviewed (XR chest shows no focal pulmonary consolidation. Follow-up as clinical indications persist.)  Tests in the medicine section of CPT®: ordered and reviewed  Independent visualization of images, tracings, or specimens: yes    Patient Progress  Patient progress: stable         DIAGNOSIS  Final diagnoses:   Paroxysmal atrial fibrillation (CMS/HCC)       DISPOSITION  DISCHARGE    Patient discharged in stable condition.    Reviewed implications of results, diagnosis, meds, responsibility to follow up, warning signs and symptoms of possible worsening, potential complications and reasons to return to ER.    Patient/Family voiced understanding of above instructions.    Discussed plan for discharge, as there is no emergent indication  for admission. Pt/family is agreeable and understands need for follow up and repeat testing.  Pt is aware that discharge does not mean that nothing is wrong but it indicates no emergency is present that requires admission and they must continue care with follow-up as given below or physician of their choice.     FOLLOW-UP  [facility]  [address]  [facility] [address]  [phone]  Schedule an appointment as soon as possible for a visit            Medication List      Changed    carvedilol 6.25 MG tablet  Commonly known as:  COREG  Take 1 tablet by mouth 2 (Two) Times a Day.  What changed:    medication strength  how much to take              Latest Documented Vital Signs:  As of 8:34 PM  BP- 111/74 HR- 87 Temp- 99 °F (37.2 °C) (Tympanic) O2 sat- 98%    --  Documentation assistance provided by lisseth Bahena for Dr. Kiser.  Information recorded by the scribe was done at my direction and has been verified and validated by me.         Harika Bahena  02/02/20 1723       Leonel Kiser MD  02/02/20 2039

## 2020-02-06 ENCOUNTER — OFFICE VISIT (OUTPATIENT)
Dept: CARDIOLOGY | Facility: CLINIC | Age: 85
End: 2020-02-06

## 2020-02-06 VITALS
SYSTOLIC BLOOD PRESSURE: 126 MMHG | WEIGHT: 118 LBS | HEART RATE: 54 BPM | HEIGHT: 64 IN | BODY MASS INDEX: 20.14 KG/M2 | DIASTOLIC BLOOD PRESSURE: 60 MMHG

## 2020-02-06 DIAGNOSIS — I49.3 PVC (PREMATURE VENTRICULAR CONTRACTION): ICD-10-CM

## 2020-02-06 DIAGNOSIS — I47.1 PSVT (PAROXYSMAL SUPRAVENTRICULAR TACHYCARDIA) (HCC): Primary | ICD-10-CM

## 2020-02-06 DIAGNOSIS — I34.0 NON-RHEUMATIC MITRAL REGURGITATION: ICD-10-CM

## 2020-02-06 PROCEDURE — 99214 OFFICE O/P EST MOD 30 MIN: CPT | Performed by: NURSE PRACTITIONER

## 2020-02-06 PROCEDURE — 93000 ELECTROCARDIOGRAM COMPLETE: CPT | Performed by: NURSE PRACTITIONER

## 2020-02-06 RX ORDER — CARVEDILOL 3.12 MG/1
TABLET ORAL
Qty: 270 TABLET | Refills: 3 | Status: SHIPPED | OUTPATIENT
Start: 2020-02-06 | End: 2021-05-24

## 2020-02-06 RX ORDER — CARVEDILOL 6.25 MG/1
6.25 TABLET ORAL 2 TIMES DAILY
Qty: 180 TABLET | Refills: 3 | Status: SHIPPED | OUTPATIENT
Start: 2020-02-06 | End: 2020-02-06

## 2020-02-06 NOTE — PROGRESS NOTES
Date of Office Visit: 20  Encounter Provider: PAULIE Brandt  Place of Service: [facility] CARDIOLOGY  Patient Name: Jaqueline Rojas  :1924    Chief Complaint   Patient presents with   • Rapid Heart Rate   • Follow-up   :     HPI: Jaqueline Rojas is a 89 y.o. female  with history of atrial ventricular contraction, supraventricular tachycardia, mitral regurgitation, dyspnea on exertion.  She is followed by Dr. Chai Reyes.  I will see her for the first time today and have reviewed her medical record.  She has history of frequent PVCs accounting for 24% including triplets and couplets.  She had episodes of rapid heart beating and ZIO Patch showed episodes of supraventricular tachycardia at a rate of 190 bpm lasting 33 minutes.  She started on metoprolol 12.5 mg twice daily did not really tolerate metoprolol and switch to carvedilol.  Echocardiogram showed normal left ventricular systolic function, moderate to severe mitral insufficiency, mild tricuspid insufficiency, and normal RVSP.  In May 2018 she was hospitalized had some supraventricular tachycardia and was seen by the arrhythmia service .       She presents today for emergency department room follow-up.  She had been taking 3.125 mg carvedilol twice daily and it was increased to 6.25 mg due to tachycardia.  She has had no chest pain tightness pressure, edema, lightheadedness.  She experiences palpitations mainly at night.  She has had maybe some progressive shortness of breath but able to complete her day-to-day activities.  She occasionally needs to take breaks and confirms today she would not want any valve intervention.  She would like to know if she can take double dose of carvedilol only at night since that is when her palpitations are most prominent.      No Known Allergies    Past Medical History:   Diagnosis Date   • CANCINO (dyspnea on exertion)    • Frequent PVCs     multifocal   • Irregular heart beat   "  • Knee injury    • Mitral regurgitation     Moderate to Severe per Echocardiogram 8/2016   • Palpitations    • Supraventricular tachycardia (CMS/HCC)    • Wrist fracture        Past Surgical History:   Procedure Laterality Date   • MASTOID SURGERY     • SKIN GRAFT     • WRIST SURGERY           Family and social history reviewed.     ROS  All other systems were reviewed and are negative          Objective:     Vitals:    02/06/20 1100   BP: 126/60   BP Location: Left arm   Patient Position: Sitting   Pulse: 54   Weight: 53.5 kg (118 lb)   Height: 162.6 cm (64\")     Body mass index is 20.25 kg/m².    PHYSICAL EXAM:  Physical Exam   Constitutional: She is oriented to person, place, and time. She appears well-developed and well-nourished. No distress.   HENT:   Head: Normocephalic.   Eyes: Conjunctivae are normal.   Neck: Normal range of motion. No JVD present.   Cardiovascular: Normal rate, regular rhythm and intact distal pulses.   Murmur heard.   Systolic murmur is present with a grade of 1/6 at the lower left sternal border.  Pulses:       Carotid pulses are 2+ on the right side, and 2+ on the left side.       Radial pulses are 2+ on the right side, and 2+ on the left side.        Posterior tibial pulses are 2+ on the right side, and 2+ on the left side.   Pulmonary/Chest: Effort normal and breath sounds normal. No respiratory distress. She has no wheezes. She has no rhonchi. She has no rales. She exhibits no tenderness.   Abdominal: Soft. Bowel sounds are normal. She exhibits no distension.   Musculoskeletal: Normal range of motion. She exhibits no edema.   Neurological: She is alert and oriented to person, place, and time.   Skin: Skin is warm, dry and intact. No rash noted. She is not diaphoretic. No cyanosis.   Psychiatric: She has a normal mood and affect. Her behavior is normal. Judgment and thought content normal.         ECG 12 Lead  Date/Time: 2/6/2020 4:52 PM  Performed by: Kavitha Russ, " PAULIE  Authorized by: Kavitha Russ APRN   Comparison: compared with previous ECG   Similar to previous ECG  Rhythm: sinus rhythm  Rate: normal  T inversion: III  T flattening: aVF  QRS axis: left    Clinical impression: abnormal EKG            Current Outpatient Medications   Medication Sig Dispense Refill   • aspirin 81 MG EC tablet Take 81 mg by mouth Daily.     • carvedilol (COREG) 3.125 MG tablet 1 tablet in am and two tablets at night. 270 tablet 3   • Multiple Vitamin (MULTIVITAMIN) tablet Take 1 tablet by mouth daily.       No current facility-administered medications for this visit.      Assessment:       Diagnosis Plan   1. PSVT (paroxysmal supraventricular tachycardia) (CMS/[facility])     2. PVC (premature ventricular contraction)     3. Non-rheumatic mitral regurgitation          No orders of the defined types were placed in this encounter.        Plan:   1.  89-year-old female with supraventricular tachycardia.  She is to take 3.125 mg carvedilol in the morning and take 6.25 mg carvedilol at night since her palpitation issues are mainly at night.  Prescription updated.  She is to call with new or worsening symptoms, questions or concerns.  2.  History  premature ventricular contractions  3.  Mitral insufficiency moderate to severe on echocardiogram August 2015.  Agreed to continue to follow this clinically she may be having some progressive shortness of breath but completing her day-to-day activities without real limitation just needs to take a short break.        We will keep follow-up appointment in late July.          It has been a pleasure to participate in this patient's care.      Thank you,  PAULIE Brandt      **I used Dragon to dictate this note:**

## 2020-02-10 ENCOUNTER — TELEPHONE (OUTPATIENT)
Dept: CARDIOLOGY | Facility: CLINIC | Age: 85
End: 2020-02-10

## 2020-02-10 NOTE — TELEPHONE ENCOUNTER
Patient called to inquire what she can take for diarrhea along with her other medications.  Kavitha instructed she could take Lomotil.  I called and left a message on patient's voicemail relaying this information.  / FABIAN

## 2020-03-11 ENCOUNTER — HOSPITAL ENCOUNTER (EMERGENCY)
Facility: HOSPITAL | Age: 85
Discharge: HOME | End: 2020-03-11
Attending: EMERGENCY MEDICINE | Admitting: EMERGENCY MEDICINE

## 2020-03-11 VITALS
BODY MASS INDEX: 20.66 KG/M2 | DIASTOLIC BLOOD PRESSURE: 67 MMHG | TEMPERATURE: 97.4 F | WEIGHT: 121 LBS | SYSTOLIC BLOOD PRESSURE: 121 MMHG | RESPIRATION RATE: 18 BRPM | OXYGEN SATURATION: 97 % | HEIGHT: 64 IN | HEART RATE: 72 BPM

## 2020-03-11 DIAGNOSIS — R53.1 GENERALIZED WEAKNESS: ICD-10-CM

## 2020-03-11 DIAGNOSIS — I47.1 SVT (SUPRAVENTRICULAR TACHYCARDIA) (HCC): Primary | ICD-10-CM

## 2020-03-11 LAB
ALBUMIN SERPL-MCNC: 3.9 G/DL (ref 3.5–5.2)
ALBUMIN/GLOB SERPL: 1.3 G/DL
ALP SERPL-CCNC: 71 U/L (ref 39–117)
ALT SERPL W P-5'-P-CCNC: 15 U/L (ref 1–33)
ANION GAP SERPL CALCULATED.3IONS-SCNC: 13.3 MMOL/L (ref 5–15)
AST SERPL-CCNC: 24 U/L (ref 1–32)
BASOPHILS # BLD AUTO: 0.05 10*3/MM3 (ref 0–0.2)
BASOPHILS NFR BLD AUTO: 0.6 % (ref 0–1.5)
BILIRUB SERPL-MCNC: 0.4 MG/DL (ref 0.2–1.2)
BUN BLD-MCNC: 27 MG/DL (ref 8–23)
BUN/CREAT SERPL: 25.5 (ref 7–25)
CALCIUM SPEC-SCNC: 9 MG/DL (ref 8.2–9.6)
CHLORIDE SERPL-SCNC: 102 MMOL/L (ref 98–107)
CO2 SERPL-SCNC: 23.7 MMOL/L (ref 22–29)
CREAT BLD-MCNC: 1.06 MG/DL (ref 0.57–1)
DEPRECATED RDW RBC AUTO: 42.6 FL (ref 37–54)
EOSINOPHIL # BLD AUTO: 0.29 10*3/MM3 (ref 0–0.4)
EOSINOPHIL NFR BLD AUTO: 3.5 % (ref 0.3–6.2)
ERYTHROCYTE [DISTWIDTH] IN BLOOD BY AUTOMATED COUNT: 13.3 % (ref 12.3–15.4)
GFR SERPL CREATININE-BSD FRML MDRD: 48 ML/MIN/1.73
GLOBULIN UR ELPH-MCNC: 3.1 GM/DL
GLUCOSE BLD-MCNC: 179 MG/DL (ref 65–99)
HCT VFR BLD AUTO: 37.9 % (ref 34–46.6)
HGB BLD-MCNC: 12.7 G/DL (ref 12–15.9)
HOLD SPECIMEN: NORMAL
HOLD SPECIMEN: NORMAL
IMM GRANULOCYTES # BLD AUTO: 0.05 10*3/MM3 (ref 0–0.05)
IMM GRANULOCYTES NFR BLD AUTO: 0.6 % (ref 0–0.5)
LYMPHOCYTES # BLD AUTO: 1.48 10*3/MM3 (ref 0.7–3.1)
LYMPHOCYTES NFR BLD AUTO: 18 % (ref 19.6–45.3)
MCH RBC QN AUTO: 29.3 PG (ref 26.6–33)
MCHC RBC AUTO-ENTMCNC: 33.5 G/DL (ref 31.5–35.7)
MCV RBC AUTO: 87.5 FL (ref 79–97)
MONOCYTES # BLD AUTO: 0.73 10*3/MM3 (ref 0.1–0.9)
MONOCYTES NFR BLD AUTO: 8.9 % (ref 5–12)
NEUTROPHILS # BLD AUTO: 5.63 10*3/MM3 (ref 1.7–7)
NEUTROPHILS NFR BLD AUTO: 68.4 % (ref 42.7–76)
NRBC BLD AUTO-RTO: 0 /100 WBC (ref 0–0.2)
NT-PROBNP SERPL-MCNC: 614.5 PG/ML (ref 5–1800)
PLATELET # BLD AUTO: 264 10*3/MM3 (ref 140–450)
PMV BLD AUTO: 9.1 FL (ref 6–12)
POTASSIUM BLD-SCNC: 4.4 MMOL/L (ref 3.5–5.2)
PROT SERPL-MCNC: 7 G/DL (ref 6–8.5)
RBC # BLD AUTO: 4.33 10*6/MM3 (ref 3.77–5.28)
SODIUM BLD-SCNC: 139 MMOL/L (ref 136–145)
TROPONIN T SERPL-MCNC: <0.01 NG/ML (ref 0–0.03)
WBC NRBC COR # BLD: 8.23 10*3/MM3 (ref 3.4–10.8)
WHOLE BLOOD HOLD SPECIMEN: NORMAL
WHOLE BLOOD HOLD SPECIMEN: NORMAL

## 2020-03-11 PROCEDURE — 80053 COMPREHEN METABOLIC PANEL: CPT | Performed by: EMERGENCY MEDICINE

## 2020-03-11 PROCEDURE — 36415 COLL VENOUS BLD VENIPUNCTURE: CPT

## 2020-03-11 PROCEDURE — 93005 ELECTROCARDIOGRAM TRACING: CPT

## 2020-03-11 PROCEDURE — 83880 ASSAY OF NATRIURETIC PEPTIDE: CPT | Performed by: EMERGENCY MEDICINE

## 2020-03-11 PROCEDURE — 96374 THER/PROPH/DIAG INJ IV PUSH: CPT

## 2020-03-11 PROCEDURE — 93005 ELECTROCARDIOGRAM TRACING: CPT | Performed by: EMERGENCY MEDICINE

## 2020-03-11 PROCEDURE — 99284 EMERGENCY DEPT VISIT MOD MDM: CPT

## 2020-03-11 PROCEDURE — 25010000002 ADENOSINE PER 6 MG: Performed by: EMERGENCY MEDICINE

## 2020-03-11 PROCEDURE — 85025 COMPLETE CBC W/AUTO DIFF WBC: CPT | Performed by: EMERGENCY MEDICINE

## 2020-03-11 PROCEDURE — 93010 ELECTROCARDIOGRAM REPORT: CPT | Performed by: INTERNAL MEDICINE

## 2020-03-11 PROCEDURE — 84484 ASSAY OF TROPONIN QUANT: CPT | Performed by: EMERGENCY MEDICINE

## 2020-03-11 RX ORDER — ADENOSINE 3 MG/ML
12 INJECTION, SOLUTION INTRAVENOUS ONCE
Status: DISCONTINUED | OUTPATIENT
Start: 2020-03-11 | End: 2020-03-11

## 2020-03-11 RX ORDER — ADENOSINE 3 MG/ML
6 INJECTION, SOLUTION INTRAVENOUS ONCE
Status: COMPLETED | OUTPATIENT
Start: 2020-03-11 | End: 2020-03-11

## 2020-03-11 RX ORDER — SODIUM CHLORIDE 0.9 % (FLUSH) 0.9 %
10 SYRINGE (ML) INJECTION AS NEEDED
Status: DISCONTINUED | OUTPATIENT
Start: 2020-03-11 | End: 2020-03-11 | Stop reason: HOSPADM

## 2020-03-11 RX ADMIN — ADENOSINE 6 MG: 3 INJECTION, SOLUTION INTRAVENOUS at 04:45

## 2020-03-11 NOTE — ED TRIAGE NOTES
Pt complaints of rapid heart rate and SOA that started at 0130. Pt has a history of SVT.  Pt denies any pain.

## 2020-03-11 NOTE — ED PROVIDER NOTES
" EMERGENCY DEPARTMENT ENCOUNTER    CHIEF COMPLAINT  Chief Complaint: palpitations  History given by: patient  History limited by: none  Room Number: 12/12  PMD: Provider, No Known      HPI:  Pt is a 89 y.o. female who presents complaining of palpitations described as \"I'm in SVT\" at 0130 this AM that woke her from sleep. Pt states that she has been taking her coreg and \"extra doses\" as described by cardiology. Pt states that she also feels generally weak and tired. Hx of SVT followed by . Pt denies cp. Pt states that she has been seen to ED twice in the past. One required chemical cardioversion. Family at bedside.     Duration:  3 hours  Onset: gradual  Timing: constant  Location: chest  Intensity/Severity: moderate  Progression: unchanged  Associated Symptoms: generalized weakness, fatigue  Previous Episodes: similar to SVT  Treatment before arrival: 2 doses of coreg at home    PAST MEDICAL HISTORY  Active Ambulatory Problems     Diagnosis Date Noted   • Irregular heart rate 02/04/2016   • Shortness of breath on exertion 02/05/2016   • Supraventricular tachycardia (CMS/HCC) 08/17/2016   • PVC (premature ventricular contraction) 08/17/2016   • PSVT (paroxysmal supraventricular tachycardia) (CMS/HCC) 01/05/2018   • Palpitations 01/23/2018     Resolved Ambulatory Problems     Diagnosis Date Noted   • No Resolved Ambulatory Problems     Past Medical History:   Diagnosis Date   • CANCINO (dyspnea on exertion)    • Frequent PVCs    • Irregular heart beat    • Knee injury    • Mitral regurgitation    • Wrist fracture        PAST SURGICAL HISTORY  Past Surgical History:   Procedure Laterality Date   • MASTOID SURGERY     • SKIN GRAFT     • WRIST SURGERY         FAMILY HISTORY  Family History   Problem Relation Age of Onset   • Heart disease Mother    • Diabetes Mother    • Stroke Father    • Diabetes Brother        SOCIAL HISTORY  Social History     Socioeconomic History   • Marital status:      Spouse name: " Not on file   • Number of children: Not on file   • Years of education: Not on file   • Highest education level: Not on file   Tobacco Use   • Smoking status: Never Smoker   • Smokeless tobacco: Never Used   • Tobacco comment: caffeine use - 1 cup coffee daily   Substance and Sexual Activity   • Alcohol use: No   • Drug use: No   Lifestyle   • Physical activity:     Days per week: 7 days     Minutes per session: 20 min   • Stress: Not on file       ALLERGIES  Patient has no known allergies.    REVIEW OF SYSTEMS  Review of Systems   Constitutional: Positive for fatigue. Negative for fever.   HENT: Negative for sore throat.    Eyes: Negative.    Respiratory: Negative for cough and shortness of breath.    Cardiovascular: Positive for palpitations. Negative for chest pain.   Gastrointestinal: Negative for abdominal pain, diarrhea and vomiting.   Genitourinary: Negative for dysuria.   Musculoskeletal: Negative for neck pain.   Skin: Negative for rash.   Allergic/Immunologic: Negative.    Neurological: Positive for weakness (generalized). Negative for numbness and headaches.   Hematological: Negative.    Psychiatric/Behavioral: Negative.    All other systems reviewed and are negative.      PHYSICAL EXAM  ED Triage Vitals [03/11/20 0419]   Temp Heart Rate Resp BP SpO2   97.4 °F (36.3 °C) (!) 160 -- -- 100 %      Temp src Heart Rate Source Patient Position BP Location FiO2 (%)   Tympanic Monitor -- -- --       Physical Exam   Constitutional: She is oriented to person, place, and time. No distress.   HENT:   Head: Normocephalic and atraumatic.   Eyes: Pupils are equal, round, and reactive to light. EOM are normal.   Neck: Normal range of motion. Neck supple.   Cardiovascular: Regular rhythm and normal heart sounds.   tachycardia   Pulmonary/Chest: Effort normal and breath sounds normal. No respiratory distress.   Abdominal: Soft. There is no tenderness. There is no rebound and no guarding.   Musculoskeletal: Normal range of  motion. She exhibits no edema.   Neurological: She is alert and oriented to person, place, and time. She has normal sensation and normal strength.   Skin: Skin is warm and dry. No rash noted.   Psychiatric: Mood and affect normal.   Nursing note and vitals reviewed.      LAB RESULTS  Lab Results (last 24 hours)     Procedure Component Value Units Date/Time    CBC & Differential [[id number]] Collected:  03/11/20 0444    Specimen:  Blood Updated:  03/11/20 0512    Narrative:       The following orders were created for panel order CBC & Differential.  Procedure                               Abnormality         Status                     ---------                               -----------         ------                     CBC Auto Differential[[id number]]        Abnormal            Final result                 Please view results for these tests on the individual orders.    Comprehensive Metabolic Panel [[id number]]  (Abnormal) Collected:  03/11/20 0444    Specimen:  Blood Updated:  03/11/20 0519     Glucose 179 mg/dL      BUN 27 mg/dL      Creatinine 1.06 mg/dL      Sodium 139 mmol/L      Potassium 4.4 mmol/L      Chloride 102 mmol/L      CO2 23.7 mmol/L      Calcium 9.0 mg/dL      Total Protein 7.0 g/dL      Albumin 3.90 g/dL      ALT (SGPT) 15 U/L      AST (SGOT) 24 U/L      Alkaline Phosphatase 71 U/L      Total Bilirubin 0.4 mg/dL      eGFR Non African Amer 48 mL/min/1.73      Globulin 3.1 gm/dL      A/G Ratio 1.3 g/dL      BUN/Creatinine Ratio 25.5     Anion Gap 13.3 mmol/L     Narrative:       GFR Normal >60  Chronic Kidney Disease <60  Kidney Failure <15      Troponin [[id number]]  (Normal) Collected:  03/11/20 0444    Specimen:  Blood Updated:  03/11/20 0519     Troponin T <0.010 ng/mL     Narrative:       Troponin T Reference Range:  <= 0.03 ng/mL-   Negative for AMI  >0.03 ng/mL-     Abnormal for myocardial necrosis.  Clinicians would have to utilize clinical acumen, EKG, Troponin and serial changes to determine  if it is an Acute Myocardial Infarction or myocardial injury due to an underlying chronic condition.       Results may be falsely decreased if patient taking Biotin.      BNP [[id number]]  (Normal) Collected:  03/11/20 0444    Specimen:  Blood Updated:  03/11/20 0517     proBNP 614.5 pg/mL     Narrative:       Among patients with dyspnea, NT-proBNP is highly sensitive for the detection of acute congestive heart failure. In addition NT-proBNP of <300 pg/ml effectively rules out acute congestive heart failure with 99% negative predictive value.    Results may be falsely decreased if patient taking Biotin.      CBC Auto Differential [[id number]]  (Abnormal) Collected:  03/11/20 0444    Specimen:  Blood Updated:  03/11/20 0512     WBC 8.23 10*3/mm3      RBC 4.33 10*6/mm3      Hemoglobin 12.7 g/dL      Hematocrit 37.9 %      MCV 87.5 fL      MCH 29.3 pg      MCHC 33.5 g/dL      RDW 13.3 %      RDW-SD 42.6 fl      MPV 9.1 fL      Platelets 264 10*3/mm3      Neutrophil % 68.4 %      Lymphocyte % 18.0 %      Monocyte % 8.9 %      Eosinophil % 3.5 %      Basophil % 0.6 %      Immature Grans % 0.6 %      Neutrophils, Absolute 5.63 10*3/mm3      Lymphocytes, Absolute 1.48 10*3/mm3      Monocytes, Absolute 0.73 10*3/mm3      Eosinophils, Absolute 0.29 10*3/mm3      Basophils, Absolute 0.05 10*3/mm3      Immature Grans, Absolute 0.05 10*3/mm3      nRBC 0.0 /100 WBC           I ordered the above labs and reviewed the results      PROCEDURES  Chemical Cardioversion  Date/Time: 3/11/2020 4:46 AM  Performed by: Bravo Long MD  Authorized by: Bravo Long MD     Consent:     Consent obtained:  Verbal    Consent given by:  Patient    : with pt   Pre-procedure details:     Cardioversion basis:  Elective    Rhythm:  Supraventricular tachycardia  Attempt one:     Cardioversion medication:  Adenosine 6mg (0446-158 BPM)      Medication outcome:  Conversion to normal sinus rhythm (0447-93 BPM)  Post-procedure details:      Patient status:  Awake    Patient tolerance of procedure:  Tolerated well, no immediate complications      EKG          EKG time: 0427  Rhythm/Rate: 152 BPM SVT  P waves and UT: absent  QRS, axis: normal   ST and T waves: normal     Interpreted Contemporaneously by me, independently viewed  changed compared to prior 2/2/20    EKG          EKG time: 0449  Rhythm/Rate: 93 BPM NSR  P waves and UT: normal  QRS, axis: normal   ST and T waves: normal     Interpreted Contemporaneously by me, independently viewed  changed compared to prior above      PROGRESS AND CONSULTS     0440-Informed pt that she does appears to be in SVT again. HR-158 BPM. Risk and benefits of chemical cardioversion discussed. Pt has had success with this in the past. The patient indicates understanding of these issues and agrees with the plan    0048-Pt with sucessful chemical cardioversion. BP-93 BPM in NSR. Discussed with pt the plan to continue with cardiac monitor and review lab work for further evaluation. The patient indicates understanding of these issues and agrees with the plan.    0626-Rechecked pt. Pt is resting comfortably. Notified pt of unremarkable work up in the ED. HR-75 BPM in NSR. Discussed the plan to discharge the pt home. I instructed the pt to f/u with [facility]. Pt understands and agrees with the plan, all questions answered.      MEDICAL DECISION MAKING  Results were reviewed/discussed with the patient and they were also made aware of online access. Pt also made aware that some labs, such as cultures, will not be resulted during ER visit and follow up with PMD is necessary.     MDM  Number of Diagnoses or Management Options     Amount and/or Complexity of Data Reviewed  Clinical lab tests: reviewed and ordered (troponin <0.010  BNP-614.5  WBC-WNL)  Tests in the radiology section of CPT®: reviewed and ordered  Tests in the medicine section of CPT®: reviewed and ordered (See EKG procedure note. )  Decide to obtain previous medical  records or to obtain history from someone other than the patient: yes  Discuss the patient with other providers: yes (medicine)  Independent visualization of images, tracings, or specimens: yes           DIAGNOSIS  Final diagnoses:   SVT (supraventricular tachycardia) (CMS/HCC)   Generalized weakness       DISPOSITION  DISCHARGE    Patient discharged in stable condition.    Reviewed implications of results, diagnosis, meds, responsibility to follow up, warning signs and symptoms of possible worsening, potential complications and reasons to return to ER.    Patient/Family voiced understanding of above instructions.    Discussed plan for discharge, as there is no emergent indication for admission. Patient referred to primary care provider for BP management due to today's BP. Pt/family is agreeable and understands need for follow up and repeat testing.  Pt is aware that discharge does not mean that nothing is wrong but it indicates no emergency is present that requires admission and they must continue care with follow-up as given below or physician of their choice.     FOLLOW-UP  Chai Reyes MD  [address] [address]  [phone]    Schedule an appointment as soon as possible for a visit            Medication List      No changes were made to your prescriptions during this visit.             Latest Documented Vital Signs:  As of 06:55  BP- 115/68 HR- 77 Temp- 97.4 °F (36.3 °C) (Tympanic) O2 sat- 97%    --  Documentation assistance provided by lisseth Munoz for MD Francis.  Information recorded by the scribe was done at my direction and has been verified and validated by me.                 Lima Mnuoz  03/11/20 0627       Bravo Long MD  03/11/20 0655

## 2020-07-10 ENCOUNTER — TELEPHONE (OUTPATIENT)
Dept: CARDIOLOGY | Facility: CLINIC | Age: 85
End: 2020-07-10

## 2020-07-10 NOTE — TELEPHONE ENCOUNTER
Pt called to make sure she is okay to take amoxicillin for dental work along with her other medications.  Thanks/[facility]    # [phone]

## 2020-09-25 ENCOUNTER — APPOINTMENT (OUTPATIENT)
Dept: GENERAL RADIOLOGY | Facility: HOSPITAL | Age: 85
End: 2020-09-25

## 2020-09-25 ENCOUNTER — HOSPITAL ENCOUNTER (EMERGENCY)
Facility: HOSPITAL | Age: 85
Discharge: HOME | End: 2020-09-26
Attending: EMERGENCY MEDICINE | Admitting: EMERGENCY MEDICINE

## 2020-09-25 VITALS
SYSTOLIC BLOOD PRESSURE: 92 MMHG | RESPIRATION RATE: 18 BRPM | HEART RATE: 85 BPM | TEMPERATURE: 98.5 F | OXYGEN SATURATION: 96 % | DIASTOLIC BLOOD PRESSURE: 63 MMHG

## 2020-09-25 DIAGNOSIS — I47.9 TACHYCARDIA, PAROXYSMAL (HCC): Primary | ICD-10-CM

## 2020-09-25 LAB
ANION GAP SERPL CALCULATED.3IONS-SCNC: 3.7 MMOL/L (ref 5–15)
BASOPHILS # BLD AUTO: 0.03 10*3/MM3 (ref 0–0.2)
BASOPHILS NFR BLD AUTO: 0.4 % (ref 0–1.5)
BUN SERPL-MCNC: 24 MG/DL (ref 8–23)
BUN/CREAT SERPL: 32.9 (ref 7–25)
CALCIUM SPEC-SCNC: 8.9 MG/DL (ref 8.2–9.6)
CHLORIDE SERPL-SCNC: 105 MMOL/L (ref 98–107)
CO2 SERPL-SCNC: 27.3 MMOL/L (ref 22–29)
CREAT SERPL-MCNC: 0.73 MG/DL (ref 0.57–1)
DEPRECATED RDW RBC AUTO: 42.1 FL (ref 37–54)
EOSINOPHIL # BLD AUTO: 0.25 10*3/MM3 (ref 0–0.4)
EOSINOPHIL NFR BLD AUTO: 3.5 % (ref 0.3–6.2)
ERYTHROCYTE [DISTWIDTH] IN BLOOD BY AUTOMATED COUNT: 13.1 % (ref 12.3–15.4)
GFR SERPL CREATININE-BSD FRML MDRD: 74 ML/MIN/1.73
GLUCOSE SERPL-MCNC: 202 MG/DL (ref 65–99)
HCT VFR BLD AUTO: 34.2 % (ref 34–46.6)
HGB BLD-MCNC: 11.5 G/DL (ref 12–15.9)
IMM GRANULOCYTES # BLD AUTO: 0.03 10*3/MM3 (ref 0–0.05)
IMM GRANULOCYTES NFR BLD AUTO: 0.4 % (ref 0–0.5)
LYMPHOCYTES # BLD AUTO: 1.13 10*3/MM3 (ref 0.7–3.1)
LYMPHOCYTES NFR BLD AUTO: 16 % (ref 19.6–45.3)
MCH RBC QN AUTO: 29.7 PG (ref 26.6–33)
MCHC RBC AUTO-ENTMCNC: 33.6 G/DL (ref 31.5–35.7)
MCV RBC AUTO: 88.4 FL (ref 79–97)
MONOCYTES # BLD AUTO: 0.68 10*3/MM3 (ref 0.1–0.9)
MONOCYTES NFR BLD AUTO: 9.6 % (ref 5–12)
NEUTROPHILS NFR BLD AUTO: 4.96 10*3/MM3 (ref 1.7–7)
NEUTROPHILS NFR BLD AUTO: 70.1 % (ref 42.7–76)
NRBC BLD AUTO-RTO: 0 /100 WBC (ref 0–0.2)
NT-PROBNP SERPL-MCNC: 643.1 PG/ML (ref 0–1800)
PLATELET # BLD AUTO: 221 10*3/MM3 (ref 140–450)
PMV BLD AUTO: 9 FL (ref 6–12)
POTASSIUM SERPL-SCNC: 4.1 MMOL/L (ref 3.5–5.2)
RBC # BLD AUTO: 3.87 10*6/MM3 (ref 3.77–5.28)
SODIUM SERPL-SCNC: 136 MMOL/L (ref 136–145)
TROPONIN T SERPL-MCNC: <0.01 NG/ML (ref 0–0.03)
WBC # BLD AUTO: 7.08 10*3/MM3 (ref 3.4–10.8)

## 2020-09-25 PROCEDURE — 99284 EMERGENCY DEPT VISIT MOD MDM: CPT

## 2020-09-25 PROCEDURE — 93010 ELECTROCARDIOGRAM REPORT: CPT | Performed by: INTERNAL MEDICINE

## 2020-09-25 PROCEDURE — 83880 ASSAY OF NATRIURETIC PEPTIDE: CPT | Performed by: EMERGENCY MEDICINE

## 2020-09-25 PROCEDURE — 93005 ELECTROCARDIOGRAM TRACING: CPT | Performed by: EMERGENCY MEDICINE

## 2020-09-25 PROCEDURE — 85025 COMPLETE CBC W/AUTO DIFF WBC: CPT | Performed by: EMERGENCY MEDICINE

## 2020-09-25 PROCEDURE — 84484 ASSAY OF TROPONIN QUANT: CPT | Performed by: EMERGENCY MEDICINE

## 2020-09-25 PROCEDURE — 71045 X-RAY EXAM CHEST 1 VIEW: CPT

## 2020-09-25 PROCEDURE — 80048 BASIC METABOLIC PNL TOTAL CA: CPT | Performed by: EMERGENCY MEDICINE

## 2020-09-25 RX ORDER — SODIUM CHLORIDE 0.9 % (FLUSH) 0.9 %
10 SYRINGE (ML) INJECTION AS NEEDED
Status: DISCONTINUED | OUTPATIENT
Start: 2020-09-25 | End: 2020-09-26 | Stop reason: HOSPADM

## 2020-09-26 NOTE — ED PROVIDER NOTES
EMERGENCY DEPARTMENT ENCOUNTER    Room Number:  38/38  Date seen:  9/26/2020  PCP: Provider, No Known  Historian: Patient      HPI:  Chief Complaint: Rapid heart rate  A complete HPI/ROS/PMH/PSH/SH/FH are unobtainable due to: Nothing  Context: Jaqueline Rojas is a 89 y.o. female who presents to the ED c/o rapid heart rate that occurred at home tonight and lasted about 2 hours.  She reports that she has had this happen before.  Review of her chart reveals she has a history of PVCs and paroxysmal SVT.  She denies any pain, shortness of air, nausea, vomiting, diaphoresis.  She takes Coreg 3.125 mg twice daily, and she took 2 additional tablets when her symptoms started this evening.  When she arrived here her initial heart rate in triage was in the 150s however by the time they obtained EKG, she had converted to sinus rhythm.  She reports no symptoms currently.            PAST MEDICAL HISTORY  Active Ambulatory Problems     Diagnosis Date Noted   • Irregular heart rate 02/04/2016   • Shortness of breath on exertion 02/05/2016   • Supraventricular tachycardia (CMS/Formerly [facility]) 08/17/2016   • PVC (premature ventricular contraction) 08/17/2016   • PSVT (paroxysmal supraventricular tachycardia) (CMS/Formerly [facility]) 01/05/2018   • Palpitations 01/23/2018     Resolved Ambulatory Problems     Diagnosis Date Noted   • No Resolved Ambulatory Problems     Past Medical History:   Diagnosis Date   • CANCINO (dyspnea on exertion)    • Frequent PVCs    • Irregular heart beat    • Knee injury    • Mitral regurgitation    • Wrist fracture          PAST SURGICAL HISTORY  Past Surgical History:   Procedure Laterality Date   • MASTOID SURGERY     • SKIN GRAFT     • WRIST SURGERY           FAMILY HISTORY  Family History   Problem Relation Age of Onset   • Heart disease Mother    • Diabetes Mother    • Stroke Father    • Diabetes Brother          SOCIAL HISTORY  Social History     Socioeconomic History   • Marital status:      Spouse name: Not on file    • Number of children: Not on file   • Years of education: Not on file   • Highest education level: Not on file   Tobacco Use   • Smoking status: Never Smoker   • Smokeless tobacco: Never Used   • Tobacco comment: caffeine use - 1 cup coffee daily   Substance and Sexual Activity   • Alcohol use: No   • Drug use: No   Lifestyle   • Physical activity     Days per week: 7 days     Minutes per session: 20 min   • Stress: Not on file         ALLERGIES  Patient has no known allergies.        REVIEW OF SYSTEMS  Review of Systems   Review of all 14 systems is negative other than stated in the HPI above.      PHYSICAL EXAM  ED Triage Vitals   Temp Heart Rate Resp BP SpO2   09/25/20 2209 09/25/20 2209 09/25/20 2209 09/25/20 2220 09/25/20 2209   98.5 °F (36.9 °C) (!) 158 18 94/68 95 %      Temp src Heart Rate Source Patient Position BP Location FiO2 (%)   -- 09/25/20 2209 -- -- --    Monitor            GENERAL: Awake and alert, no acute distress  HENT: nares patent  EYES: no scleral icterus  CV: regular rhythm, normal rate, no murmur  RESPIRATORY: normal effort, lungs clear to auscultation bilaterally  ABDOMEN: soft, nontender throughout  MUSCULOSKELETAL: no deformity, no peripheral edema  NEURO: alert, moves all extremities, follows commands  PSYCH:  calm, cooperative  SKIN: warm, dry    Vital signs and nursing notes reviewed.          LAB RESULTS  Recent Results (from the past 24 hour(s))   Basic Metabolic Panel    Collection Time: 09/25/20 10:49 PM    Specimen: Blood   Result Value Ref Range    Glucose 202 (H) 65 - 99 mg/dL    BUN 24 (H) 8 - 23 mg/dL    Creatinine 0.73 0.57 - 1.00 mg/dL    Sodium 136 136 - 145 mmol/L    Potassium 4.1 3.5 - 5.2 mmol/L    Chloride 105 98 - 107 mmol/L    CO2 27.3 22.0 - 29.0 mmol/L    Calcium 8.9 8.2 - 9.6 mg/dL    eGFR Non African Amer 74 >60 mL/min/1.73    BUN/Creatinine Ratio 32.9 (H) 7.0 - 25.0    Anion Gap 3.7 (L) 5.0 - 15.0 mmol/L   BNP    Collection Time: 09/25/20 10:49 PM    Specimen:  Blood   Result Value Ref Range    proBNP 643.1 0.0-1,800.0 pg/mL   Troponin    Collection Time: 09/25/20 10:49 PM    Specimen: Blood   Result Value Ref Range    Troponin T <0.010 0.000 - 0.030 ng/mL   CBC Auto Differential    Collection Time: 09/25/20 10:49 PM    Specimen: Blood   Result Value Ref Range    WBC 7.08 3.40 - 10.80 10*3/mm3    RBC 3.87 3.77 - 5.28 10*6/mm3    Hemoglobin 11.5 (L) 12.0 - 15.9 g/dL    Hematocrit 34.2 34.0 - 46.6 %    MCV 88.4 79.0 - 97.0 fL    MCH 29.7 26.6 - 33.0 pg    MCHC 33.6 31.5 - 35.7 g/dL    RDW 13.1 12.3 - 15.4 %    RDW-SD 42.1 37.0 - 54.0 fl    MPV 9.0 6.0 - 12.0 fL    Platelets 221 140 - 450 10*3/mm3    Neutrophil % 70.1 42.7 - 76.0 %    Lymphocyte % 16.0 (L) 19.6 - 45.3 %    Monocyte % 9.6 5.0 - 12.0 %    Eosinophil % 3.5 0.3 - 6.2 %    Basophil % 0.4 0.0 - 1.5 %    Immature Grans % 0.4 0.0 - 0.5 %    Neutrophils, Absolute 4.96 1.70 - 7.00 10*3/mm3    Lymphocytes, Absolute 1.13 0.70 - 3.10 10*3/mm3    Monocytes, Absolute 0.68 0.10 - 0.90 10*3/mm3    Eosinophils, Absolute 0.25 0.00 - 0.40 10*3/mm3    Basophils, Absolute 0.03 0.00 - 0.20 10*3/mm3    Immature Grans, Absolute 0.03 0.00 - 0.05 10*3/mm3    nRBC 0.0 0.0 - 0.2 /100 WBC       Ordered the above labs and reviewed the results.        RADIOLOGY  Xr Chest 1 View    Result Date: 9/25/2020  PORTABLE CHEST X-RAY  HISTORY: Palpitations.  Portable chest x-ray is provided. Correlation: Chest x-ray 02/02/2020.  FINDINGS: The cardiomediastinal silhouette is normal. The lungs are hyperexpanded but clear. The costophrenic sulci are dry and the bones appear normal. There is no pneumothorax.      Negative.  This report was finalized on 9/25/2020 10:44 PM by Dr. Bassam Dominguez M.D.        Ordered the above noted radiological studies. Reviewed by me in PACS.            PROCEDURES  Procedures              MEDICATIONS GIVEN IN ER  Medications   sodium chloride 0.9 % flush 10 mL (has no administration in time range)                    MEDICAL DECISION MAKING, PROGRESS, and CONSULTS    All labs have been independently reviewed by me.  All radiology studies have been reviewed by me and discussed with radiologist dictating the report.   EKG's independently viewed and interpreted by me.  Discussion below represents my analysis of pertinent findings related to patient's condition, differential diagnosis, treatment plan and final disposition.    ED Course as of Sep 26 0020   Fri Sep 25, 2020   2222 I reviewed previous Holter study from August 2016 which showed approximately 20% PVCs and also an episode of sustained SVT.    [JR]   2223 EKG          EKG time: 2213  Rhythm/Rate: Sinus rhythm, 89  P waves and AR: Normal  QRS, axis: Left axis deviation, frequent PVCs  ST and T waves: No acute ischemic changes    Interpreted Contemporaneously by me, independently viewed  Similar compared to prior 3/11/2020          [JR]   2337 Troponin T: <0.010 [JR]   2337 proBNP: 643.1 [JR]   2337 Hemoglobin(!): 11.5 [JR]   2337 Creatinine: 0.73 [JR]   2337 Glucose(!): 202 [JR]      ED Course User Index  [JR] Nikita Mcdonald MD     Patient most likely had an episode of paroxysmal SVT that resolved after taking additional Coreg at home.  She is now in sinus rhythm and has no symptoms.  Labs and chest x-ray, EKG reassuring.  Appropriate for discharge home with outpatient cardiology follow-up and return precautions.         I wore a surgical mask, gloves, and eye protection during this patient encounter.  Patient also wearing a surgical mask.  Hand hygeine performed before and after seeing the patient.    DIAGNOSIS  Final diagnoses:   Tachycardia, paroxysmal (CMS/HCC)         DISPOSITION  DISCHARGE    Patient discharged in stable condition.    Reviewed implications of results, diagnosis, meds, responsibility to follow up, warning signs and symptoms of possible worsening, potential complications and reasons to return to ER.    Patient/Family voiced  understanding of above instructions.    Discussed plan for discharge, as there is no emergent indication for admission. Patient referred to primary care provider for BP management due to today's BP. Pt/family is agreeable and understands need for follow up and repeat testing.  Pt is aware that discharge does not mean that nothing is wrong but it indicates no emergency is present that requires admission and they must continue care with follow-up as given below or physician of their choice.     FOLLOW-UP  Chai Reyes MD  [address] [id number] [phone]      As needed         Medication List      No changes were made to your prescriptions during this visit.                   Latest Documented Vital Signs:  As of 00:19 EDT  BP- 92/63 HR- 85 Temp- 98.5 °F (36.9 °C) O2 sat- 96%        --    Please note that portions of this were completed with a voice recognition program.          Nikita Mcdonald MD  09/26/20 0022

## 2021-02-09 NOTE — DISCHARGE INSTRUCTIONS
Increase your Coreg to 6.25 mg twice a day.  Please return to the emergency department if symptoms worsen with dizziness, shortness of breath or chest pain.  Do follow-up with your cardiologist.  Call Monday for an appointment.  
no

## 2021-05-24 ENCOUNTER — APPOINTMENT (OUTPATIENT)
Dept: CT IMAGING | Facility: HOSPITAL | Age: 86
End: 2021-05-24

## 2021-05-24 ENCOUNTER — HOSPITAL ENCOUNTER (INPATIENT)
Facility: HOSPITAL | Age: 86
LOS: 21 days | Discharge: HOME | End: 2021-06-14
Attending: EMERGENCY MEDICINE | Admitting: INTERNAL MEDICINE

## 2021-05-24 DIAGNOSIS — K63.89 CECUM MASS: ICD-10-CM

## 2021-05-24 DIAGNOSIS — D50.0 IRON DEFICIENCY ANEMIA DUE TO CHRONIC BLOOD LOSS: ICD-10-CM

## 2021-05-24 DIAGNOSIS — K52.9 COLITIS: Primary | ICD-10-CM

## 2021-05-24 DIAGNOSIS — R10.9 ABDOMINAL PAIN, UNSPECIFIED ABDOMINAL LOCATION: ICD-10-CM

## 2021-05-24 LAB
ALBUMIN SERPL-MCNC: 2.9 G/DL (ref 3.5–5.2)
ALBUMIN/GLOB SERPL: 0.8 G/DL
ALP SERPL-CCNC: 64 U/L (ref 39–117)
ALT SERPL W P-5'-P-CCNC: 10 U/L (ref 1–33)
ANION GAP SERPL CALCULATED.3IONS-SCNC: 9 MMOL/L (ref 5–15)
AST SERPL-CCNC: 15 U/L (ref 1–32)
BACTERIA UR QL AUTO: ABNORMAL /HPF
BASOPHILS # BLD AUTO: 0.03 10*3/MM3 (ref 0–0.2)
BASOPHILS NFR BLD AUTO: 0.2 % (ref 0–1.5)
BILIRUB SERPL-MCNC: 0.7 MG/DL (ref 0–1.2)
BILIRUB UR QL STRIP: NEGATIVE
BUN SERPL-MCNC: 22 MG/DL (ref 8–23)
BUN/CREAT SERPL: 27.5 (ref 7–25)
CALCIUM SPEC-SCNC: 8.4 MG/DL (ref 8.2–9.6)
CHLORIDE SERPL-SCNC: 99 MMOL/L (ref 98–107)
CLARITY UR: ABNORMAL
CO2 SERPL-SCNC: 25 MMOL/L (ref 22–29)
COLOR UR: YELLOW
CREAT SERPL-MCNC: 0.8 MG/DL (ref 0.57–1)
D-LACTATE SERPL-SCNC: 1.5 MMOL/L (ref 0.5–2)
DEPRECATED RDW RBC AUTO: 38.7 FL (ref 37–54)
EOSINOPHIL # BLD AUTO: 0.02 10*3/MM3 (ref 0–0.4)
EOSINOPHIL NFR BLD AUTO: 0.1 % (ref 0.3–6.2)
ERYTHROCYTE [DISTWIDTH] IN BLOOD BY AUTOMATED COUNT: 12.8 % (ref 12.3–15.4)
GFR SERPL CREATININE-BSD FRML MDRD: 67 ML/MIN/1.73
GLOBULIN UR ELPH-MCNC: 3.5 GM/DL
GLUCOSE SERPL-MCNC: 140 MG/DL (ref 65–99)
GLUCOSE UR STRIP-MCNC: NEGATIVE MG/DL
HCT VFR BLD AUTO: 29.7 % (ref 34–46.6)
HGB BLD-MCNC: 10 G/DL (ref 12–15.9)
HGB UR QL STRIP.AUTO: NEGATIVE
HOLD SPECIMEN: NORMAL
HOLD SPECIMEN: NORMAL
HYALINE CASTS UR QL AUTO: ABNORMAL /LPF
IMM GRANULOCYTES # BLD AUTO: 0.1 10*3/MM3 (ref 0–0.05)
IMM GRANULOCYTES NFR BLD AUTO: 0.6 % (ref 0–0.5)
KETONES UR QL STRIP: NEGATIVE
LEUKOCYTE ESTERASE UR QL STRIP.AUTO: ABNORMAL
LIPASE SERPL-CCNC: 17 U/L (ref 13–60)
LYMPHOCYTES # BLD AUTO: 1.03 10*3/MM3 (ref 0.7–3.1)
LYMPHOCYTES NFR BLD AUTO: 5.8 % (ref 19.6–45.3)
MCH RBC QN AUTO: 28.5 PG (ref 26.6–33)
MCHC RBC AUTO-ENTMCNC: 33.7 G/DL (ref 31.5–35.7)
MCV RBC AUTO: 84.6 FL (ref 79–97)
MONOCYTES # BLD AUTO: 0.73 10*3/MM3 (ref 0.1–0.9)
MONOCYTES NFR BLD AUTO: 4.1 % (ref 5–12)
NEUTROPHILS NFR BLD AUTO: 15.82 10*3/MM3 (ref 1.7–7)
NEUTROPHILS NFR BLD AUTO: 89.2 % (ref 42.7–76)
NITRITE UR QL STRIP: NEGATIVE
NRBC BLD AUTO-RTO: 0 /100 WBC (ref 0–0.2)
PH UR STRIP.AUTO: 5.5 [PH] (ref 5–8)
PLATELET # BLD AUTO: 296 10*3/MM3 (ref 140–450)
PMV BLD AUTO: 9 FL (ref 6–12)
POTASSIUM SERPL-SCNC: 4.2 MMOL/L (ref 3.5–5.2)
PROT SERPL-MCNC: 6.4 G/DL (ref 6–8.5)
PROT UR QL STRIP: ABNORMAL
RBC # BLD AUTO: 3.51 10*6/MM3 (ref 3.77–5.28)
RBC # UR: ABNORMAL /HPF
REF LAB TEST METHOD: ABNORMAL
SODIUM SERPL-SCNC: 133 MMOL/L (ref 136–145)
SP GR UR STRIP: 1.02 (ref 1–1.03)
SQUAMOUS #/AREA URNS HPF: ABNORMAL /HPF
UROBILINOGEN UR QL STRIP: ABNORMAL
WBC # BLD AUTO: 17.73 10*3/MM3 (ref 3.4–10.8)
WBC UR QL AUTO: ABNORMAL /HPF
WHOLE BLOOD HOLD SPECIMEN: NORMAL
WHOLE BLOOD HOLD SPECIMEN: NORMAL

## 2021-05-24 PROCEDURE — U0004 COV-19 TEST NON-CDC HGH THRU: HCPCS | Performed by: EMERGENCY MEDICINE

## 2021-05-24 PROCEDURE — 99283 EMERGENCY DEPT VISIT LOW MDM: CPT

## 2021-05-24 PROCEDURE — 25010000002 IOPAMIDOL 61 % SOLUTION: Performed by: EMERGENCY MEDICINE

## 2021-05-24 PROCEDURE — 36415 COLL VENOUS BLD VENIPUNCTURE: CPT

## 2021-05-24 PROCEDURE — 83605 ASSAY OF LACTIC ACID: CPT | Performed by: EMERGENCY MEDICINE

## 2021-05-24 PROCEDURE — 87040 BLOOD CULTURE FOR BACTERIA: CPT | Performed by: EMERGENCY MEDICINE

## 2021-05-24 PROCEDURE — 85025 COMPLETE CBC W/AUTO DIFF WBC: CPT | Performed by: EMERGENCY MEDICINE

## 2021-05-24 PROCEDURE — 81001 URINALYSIS AUTO W/SCOPE: CPT | Performed by: EMERGENCY MEDICINE

## 2021-05-24 PROCEDURE — 25010000002 MORPHINE PER 10 MG: Performed by: EMERGENCY MEDICINE

## 2021-05-24 PROCEDURE — 80053 COMPREHEN METABOLIC PANEL: CPT | Performed by: EMERGENCY MEDICINE

## 2021-05-24 PROCEDURE — 83690 ASSAY OF LIPASE: CPT | Performed by: EMERGENCY MEDICINE

## 2021-05-24 PROCEDURE — 74177 CT ABD & PELVIS W/CONTRAST: CPT

## 2021-05-24 PROCEDURE — 25010000002 PIPERACILLIN SOD-TAZOBACTAM PER 1 G: Performed by: EMERGENCY MEDICINE

## 2021-05-24 PROCEDURE — 25010000002 ONDANSETRON PER 1 MG: Performed by: EMERGENCY MEDICINE

## 2021-05-24 RX ORDER — DIPHENOXYLATE HYDROCHLORIDE AND ATROPINE SULFATE 2.5; .025 MG/1; MG/1
1 TABLET ORAL DAILY
Status: DISCONTINUED | OUTPATIENT
Start: 2021-05-25 | End: 2021-06-14 | Stop reason: HOSPADM

## 2021-05-24 RX ORDER — ACETAMINOPHEN 160 MG/5ML
650 SOLUTION ORAL EVERY 4 HOURS PRN
Status: DISCONTINUED | OUTPATIENT
Start: 2021-05-24 | End: 2021-06-14 | Stop reason: HOSPADM

## 2021-05-24 RX ORDER — CARVEDILOL 3.12 MG/1
3.12 TABLET ORAL EVERY MORNING
Status: ON HOLD | COMMUNITY
End: 2021-06-14 | Stop reason: SDUPTHER

## 2021-05-24 RX ORDER — CARVEDILOL 6.25 MG/1
6.25 TABLET ORAL EVERY EVENING
Status: DISCONTINUED | OUTPATIENT
Start: 2021-05-25 | End: 2021-05-24

## 2021-05-24 RX ORDER — CARVEDILOL 6.25 MG/1
6.25 TABLET ORAL EVERY EVENING
Status: DISCONTINUED | OUTPATIENT
Start: 2021-05-24 | End: 2021-05-25

## 2021-05-24 RX ORDER — ACETAMINOPHEN 650 MG/1
650 SUPPOSITORY RECTAL EVERY 4 HOURS PRN
Status: DISCONTINUED | OUTPATIENT
Start: 2021-05-24 | End: 2021-06-14 | Stop reason: HOSPADM

## 2021-05-24 RX ORDER — ASPIRIN 81 MG/1
81 TABLET ORAL DAILY
Status: DISCONTINUED | OUTPATIENT
Start: 2021-05-25 | End: 2021-06-14 | Stop reason: HOSPADM

## 2021-05-24 RX ORDER — SODIUM CHLORIDE 0.9 % (FLUSH) 0.9 %
10 SYRINGE (ML) INJECTION AS NEEDED
Status: DISCONTINUED | OUTPATIENT
Start: 2021-05-24 | End: 2021-06-14 | Stop reason: HOSPADM

## 2021-05-24 RX ORDER — ACETAMINOPHEN 325 MG/1
650 TABLET ORAL EVERY 4 HOURS PRN
Status: DISCONTINUED | OUTPATIENT
Start: 2021-05-24 | End: 2021-06-14 | Stop reason: HOSPADM

## 2021-05-24 RX ORDER — ONDANSETRON 2 MG/ML
4 INJECTION INTRAMUSCULAR; INTRAVENOUS ONCE
Status: COMPLETED | OUTPATIENT
Start: 2021-05-24 | End: 2021-05-24

## 2021-05-24 RX ORDER — MORPHINE SULFATE 2 MG/ML
2 INJECTION, SOLUTION INTRAMUSCULAR; INTRAVENOUS ONCE
Status: COMPLETED | OUTPATIENT
Start: 2021-05-24 | End: 2021-05-24

## 2021-05-24 RX ORDER — SODIUM CHLORIDE 0.9 % (FLUSH) 0.9 %
10 SYRINGE (ML) INJECTION EVERY 12 HOURS SCHEDULED
Status: DISCONTINUED | OUTPATIENT
Start: 2021-05-25 | End: 2021-06-14 | Stop reason: HOSPADM

## 2021-05-24 RX ORDER — NITROGLYCERIN 0.4 MG/1
0.4 TABLET SUBLINGUAL
Status: DISCONTINUED | OUTPATIENT
Start: 2021-05-24 | End: 2021-06-14 | Stop reason: HOSPADM

## 2021-05-24 RX ORDER — CARVEDILOL 3.12 MG/1
3.12 TABLET ORAL EVERY MORNING
Status: DISCONTINUED | OUTPATIENT
Start: 2021-05-25 | End: 2021-05-25

## 2021-05-24 RX ORDER — ONDANSETRON 2 MG/ML
4 INJECTION INTRAMUSCULAR; INTRAVENOUS EVERY 6 HOURS PRN
Status: DISCONTINUED | OUTPATIENT
Start: 2021-05-24 | End: 2021-06-14 | Stop reason: HOSPADM

## 2021-05-24 RX ORDER — SODIUM CHLORIDE 9 MG/ML
100 INJECTION, SOLUTION INTRAVENOUS CONTINUOUS
Status: DISCONTINUED | OUTPATIENT
Start: 2021-05-25 | End: 2021-05-29

## 2021-05-24 RX ORDER — CARVEDILOL 3.12 MG/1
6.25 TABLET ORAL EVERY EVENING
COMMUNITY
End: 2021-06-14 | Stop reason: HOSPADM

## 2021-05-24 RX ADMIN — TAZOBACTAM SODIUM AND PIPERACILLIN SODIUM 3.38 G: 375; 3 INJECTION, SOLUTION INTRAVENOUS at 21:16

## 2021-05-24 RX ADMIN — SODIUM CHLORIDE 100 ML/HR: 9 INJECTION, SOLUTION INTRAVENOUS at 23:50

## 2021-05-24 RX ADMIN — ONDANSETRON 4 MG: 2 INJECTION INTRAMUSCULAR; INTRAVENOUS at 17:20

## 2021-05-24 RX ADMIN — CARVEDILOL 6.25 MG: 6.25 TABLET, FILM COATED ORAL at 23:33

## 2021-05-24 RX ADMIN — IOPAMIDOL 100 ML: 612 INJECTION, SOLUTION INTRAVENOUS at 19:34

## 2021-05-24 RX ADMIN — SODIUM CHLORIDE, PRESERVATIVE FREE 10 ML: 5 INJECTION INTRAVENOUS at 23:51

## 2021-05-24 RX ADMIN — MORPHINE SULFATE 2 MG: 2 INJECTION, SOLUTION INTRAMUSCULAR; INTRAVENOUS at 17:20

## 2021-05-25 PROBLEM — D72.829 LEUKOCYTOSIS: Status: ACTIVE | Noted: 2021-05-25

## 2021-05-25 PROBLEM — D64.9 ANEMIA: Status: ACTIVE | Noted: 2021-05-25

## 2021-05-25 PROBLEM — R73.9 HYPERGLYCEMIA: Status: ACTIVE | Noted: 2021-05-25

## 2021-05-25 PROBLEM — I34.0 MITRAL VALVE REGURGITATION: Status: ACTIVE | Noted: 2021-05-25

## 2021-05-25 PROBLEM — I48.91 ATRIAL FIBRILLATION: Status: ACTIVE | Noted: 2021-05-25

## 2021-05-25 LAB
ANION GAP SERPL CALCULATED.3IONS-SCNC: 8.9 MMOL/L (ref 5–15)
BUN SERPL-MCNC: 15 MG/DL (ref 8–23)
BUN/CREAT SERPL: 17.9 (ref 7–25)
CALCIUM SPEC-SCNC: 8.3 MG/DL (ref 8.2–9.6)
CEA SERPL-MCNC: 22 NG/ML
CHLORIDE SERPL-SCNC: 101 MMOL/L (ref 98–107)
CO2 SERPL-SCNC: 25.1 MMOL/L (ref 22–29)
CREAT SERPL-MCNC: 0.84 MG/DL (ref 0.57–1)
D DIMER PPP FEU-MCNC: 8.28 MCGFEU/ML (ref 0–0.49)
DEPRECATED RDW RBC AUTO: 38.3 FL (ref 37–54)
ERYTHROCYTE [DISTWIDTH] IN BLOOD BY AUTOMATED COUNT: 12.5 % (ref 12.3–15.4)
GFR SERPL CREATININE-BSD FRML MDRD: 63 ML/MIN/1.73
GLUCOSE SERPL-MCNC: 116 MG/DL (ref 65–99)
HCT VFR BLD AUTO: 28 % (ref 34–46.6)
HGB BLD-MCNC: 9.5 G/DL (ref 12–15.9)
MAGNESIUM SERPL-MCNC: 1.7 MG/DL (ref 1.7–2.3)
MCH RBC QN AUTO: 28.9 PG (ref 26.6–33)
MCHC RBC AUTO-ENTMCNC: 33.9 G/DL (ref 31.5–35.7)
MCV RBC AUTO: 85.1 FL (ref 79–97)
PLATELET # BLD AUTO: 265 10*3/MM3 (ref 140–450)
PMV BLD AUTO: 9.1 FL (ref 6–12)
POTASSIUM SERPL-SCNC: 3.9 MMOL/L (ref 3.5–5.2)
QT INTERVAL: 313 MS
RBC # BLD AUTO: 3.29 10*6/MM3 (ref 3.77–5.28)
RETICS # AUTO: 0.04 10*6/MM3 (ref 0.02–0.13)
RETICS/RBC NFR AUTO: 1.32 % (ref 0.7–1.9)
SARS-COV-2 ORF1AB RESP QL NAA+PROBE: NOT DETECTED
SODIUM SERPL-SCNC: 135 MMOL/L (ref 136–145)
TROPONIN T SERPL-MCNC: <0.01 NG/ML (ref 0–0.03)
TSH SERPL DL<=0.05 MIU/L-ACNC: 3.22 UIU/ML (ref 0.27–4.2)
WBC # BLD AUTO: 17.35 10*3/MM3 (ref 3.4–10.8)

## 2021-05-25 PROCEDURE — 99222 1ST HOSP IP/OBS MODERATE 55: CPT | Performed by: INTERNAL MEDICINE

## 2021-05-25 PROCEDURE — 85379 FIBRIN DEGRADATION QUANT: CPT | Performed by: INTERNAL MEDICINE

## 2021-05-25 PROCEDURE — 25010000002 PIPERACILLIN SOD-TAZOBACTAM PER 1 G: Performed by: NURSE PRACTITIONER

## 2021-05-25 PROCEDURE — 93005 ELECTROCARDIOGRAM TRACING: CPT | Performed by: INTERNAL MEDICINE

## 2021-05-25 PROCEDURE — 82378 CARCINOEMBRYONIC ANTIGEN: CPT | Performed by: INTERNAL MEDICINE

## 2021-05-25 PROCEDURE — 85045 AUTOMATED RETICULOCYTE COUNT: CPT | Performed by: INTERNAL MEDICINE

## 2021-05-25 PROCEDURE — 83735 ASSAY OF MAGNESIUM: CPT | Performed by: INTERNAL MEDICINE

## 2021-05-25 PROCEDURE — 80048 BASIC METABOLIC PNL TOTAL CA: CPT | Performed by: NURSE PRACTITIONER

## 2021-05-25 PROCEDURE — 84443 ASSAY THYROID STIM HORMONE: CPT | Performed by: INTERNAL MEDICINE

## 2021-05-25 PROCEDURE — 85027 COMPLETE CBC AUTOMATED: CPT | Performed by: NURSE PRACTITIONER

## 2021-05-25 PROCEDURE — 25010000002 MORPHINE PER 10 MG: Performed by: NURSE PRACTITIONER

## 2021-05-25 PROCEDURE — 84484 ASSAY OF TROPONIN QUANT: CPT | Performed by: INTERNAL MEDICINE

## 2021-05-25 PROCEDURE — 99222 1ST HOSP IP/OBS MODERATE 55: CPT | Performed by: NURSE PRACTITIONER

## 2021-05-25 PROCEDURE — 93010 ELECTROCARDIOGRAM REPORT: CPT | Performed by: INTERNAL MEDICINE

## 2021-05-25 RX ORDER — CARVEDILOL 3.12 MG/1
3.12 TABLET ORAL 2 TIMES DAILY WITH MEALS
Status: DISCONTINUED | OUTPATIENT
Start: 2021-05-25 | End: 2021-05-26

## 2021-05-25 RX ORDER — DIGOXIN 0.25 MG/ML
0.5 INJECTION INTRAMUSCULAR; INTRAVENOUS ONCE
Status: DISCONTINUED | OUTPATIENT
Start: 2021-05-25 | End: 2021-06-14 | Stop reason: HOSPADM

## 2021-05-25 RX ORDER — MORPHINE SULFATE 2 MG/ML
2 INJECTION, SOLUTION INTRAMUSCULAR; INTRAVENOUS EVERY 4 HOURS PRN
Status: DISCONTINUED | OUTPATIENT
Start: 2021-05-25 | End: 2021-06-02

## 2021-05-25 RX ADMIN — CARVEDILOL 3.12 MG: 3.12 TABLET, FILM COATED ORAL at 08:33

## 2021-05-25 RX ADMIN — SODIUM CHLORIDE 100 ML/HR: 9 INJECTION, SOLUTION INTRAVENOUS at 20:20

## 2021-05-25 RX ADMIN — SODIUM CHLORIDE, PRESERVATIVE FREE 10 ML: 5 INJECTION INTRAVENOUS at 20:22

## 2021-05-25 RX ADMIN — ASPIRIN 81 MG: 81 TABLET, COATED ORAL at 20:20

## 2021-05-25 RX ADMIN — TAZOBACTAM SODIUM AND PIPERACILLIN SODIUM 3.38 G: 375; 3 INJECTION, SOLUTION INTRAVENOUS at 19:14

## 2021-05-25 RX ADMIN — ACETAMINOPHEN 650 MG: 325 TABLET, FILM COATED ORAL at 13:51

## 2021-05-25 RX ADMIN — ACETAMINOPHEN 650 MG: 325 TABLET, FILM COATED ORAL at 20:20

## 2021-05-25 RX ADMIN — ACETAMINOPHEN 650 MG: 325 TABLET, FILM COATED ORAL at 06:09

## 2021-05-25 RX ADMIN — MORPHINE SULFATE 2 MG: 2 INJECTION, SOLUTION INTRAMUSCULAR; INTRAVENOUS at 04:13

## 2021-05-25 RX ADMIN — TAZOBACTAM SODIUM AND PIPERACILLIN SODIUM 3.38 G: 375; 3 INJECTION, SOLUTION INTRAVENOUS at 12:00

## 2021-05-25 RX ADMIN — SODIUM CHLORIDE, PRESERVATIVE FREE 10 ML: 5 INJECTION INTRAVENOUS at 08:33

## 2021-05-25 RX ADMIN — SODIUM CHLORIDE 100 ML/HR: 9 INJECTION, SOLUTION INTRAVENOUS at 09:51

## 2021-05-25 RX ADMIN — TAZOBACTAM SODIUM AND PIPERACILLIN SODIUM 3.38 G: 375; 3 INJECTION, SOLUTION INTRAVENOUS at 03:53

## 2021-05-26 ENCOUNTER — APPOINTMENT (OUTPATIENT)
Dept: CARDIOLOGY | Facility: HOSPITAL | Age: 86
End: 2021-05-26

## 2021-05-26 PROBLEM — I77.4 CELIAC ARTERY STENOSIS: Status: ACTIVE | Noted: 2021-05-26

## 2021-05-26 PROBLEM — I77.1 CELIAC ARTERY STENOSIS (HCC): Status: ACTIVE | Noted: 2021-05-26

## 2021-05-26 LAB
ANION GAP SERPL CALCULATED.3IONS-SCNC: 5 MMOL/L (ref 5–15)
BASOPHILS # BLD AUTO: 0.03 10*3/MM3 (ref 0–0.2)
BASOPHILS NFR BLD AUTO: 0.2 % (ref 0–1.5)
BH CV VAS SMA 1ST PP TIME: 15 MIN
BH CV VAS SMA 2ND PP TIME: 30 MIN
BH CV VAS SMA 3RD PP TIME: 45 MIN
BH CV VAS SMA AORTA PSV: 91.3 CM/S
BH CV VAS SMA CELIAC DIST EDV: 26 CM/S
BH CV VAS SMA CELIAC DIST PSV: 188 CM/S
BH CV VAS SMA CELIAC ORIGIN EDV: 49.4 CM/S
BH CV VAS SMA CELIAC ORIGIN PSV: 366 CM/S
BH CV VAS SMA CELIAC PROX EDV: 36 CM/S
BH CV VAS SMA CELIAC PROX PSV: 293 CM/S
BH CV VAS SMA HEPATIC EDV: 41 CM/S
BH CV VAS SMA HEPATIC PSV: 136 CM/S
BH CV VAS SMA IMA EDV: 0 CM/S
BH CV VAS SMA IMA PSV: 158 CM/S
BH CV VAS SMA ORIGIN EDV: 26 CM/S
BH CV VAS SMA ORIGIN PSV: 170 CM/S
BH CV VAS SMA SMA DIST EDV: 0 CM/S
BH CV VAS SMA SMA DIST PSV: 137 CM/S
BH CV VAS SMA SMA MID EDV: 12.4 CM/S
BH CV VAS SMA SMA MID PSV: 84 CM/S
BH CV VAS SMA SMA PROX EDV: 23 CM/S
BH CV VAS SMA SMA PROX PSV: 178 CM/S
BH CV VAS SMA SPLENIC EDV: 20 CM/S
BH CV VAS SMA SPLENIC PSV: 91 CM/S
BUN SERPL-MCNC: 13 MG/DL (ref 8–23)
BUN/CREAT SERPL: 17.3 (ref 7–25)
CALCIUM SPEC-SCNC: 8.1 MG/DL (ref 8.2–9.6)
CHLORIDE SERPL-SCNC: 103 MMOL/L (ref 98–107)
CO2 SERPL-SCNC: 26 MMOL/L (ref 22–29)
CREAT SERPL-MCNC: 0.75 MG/DL (ref 0.57–1)
DEPRECATED RDW RBC AUTO: 36.9 FL (ref 37–54)
EOSINOPHIL # BLD AUTO: 0.25 10*3/MM3 (ref 0–0.4)
EOSINOPHIL NFR BLD AUTO: 1.5 % (ref 0.3–6.2)
ERYTHROCYTE [DISTWIDTH] IN BLOOD BY AUTOMATED COUNT: 12.3 % (ref 12.3–15.4)
FERRITIN SERPL-MCNC: 115 NG/ML (ref 13–150)
FOLATE SERPL-MCNC: 18 NG/ML (ref 4.78–24.2)
GFR SERPL CREATININE-BSD FRML MDRD: 72 ML/MIN/1.73
GLUCOSE SERPL-MCNC: 104 MG/DL (ref 65–99)
HBA1C MFR BLD: 6 % (ref 4.8–5.6)
HCT VFR BLD AUTO: 25.6 % (ref 34–46.6)
HGB BLD-MCNC: 8.8 G/DL (ref 12–15.9)
IMM GRANULOCYTES # BLD AUTO: 0.08 10*3/MM3 (ref 0–0.05)
IMM GRANULOCYTES NFR BLD AUTO: 0.5 % (ref 0–0.5)
IRON 24H UR-MRATE: 12 MCG/DL (ref 37–145)
IRON SATN MFR SERPL: 6 % (ref 20–50)
LYMPHOCYTES # BLD AUTO: 0.6 10*3/MM3 (ref 0.7–3.1)
LYMPHOCYTES NFR BLD AUTO: 3.6 % (ref 19.6–45.3)
MAXIMAL PREDICTED HEART RATE: 124 BPM
MCH RBC QN AUTO: 28.5 PG (ref 26.6–33)
MCHC RBC AUTO-ENTMCNC: 34.4 G/DL (ref 31.5–35.7)
MCV RBC AUTO: 82.8 FL (ref 79–97)
MONOCYTES # BLD AUTO: 0.88 10*3/MM3 (ref 0.1–0.9)
MONOCYTES NFR BLD AUTO: 5.3 % (ref 5–12)
NEUTROPHILS NFR BLD AUTO: 14.78 10*3/MM3 (ref 1.7–7)
NEUTROPHILS NFR BLD AUTO: 88.9 % (ref 42.7–76)
NRBC BLD AUTO-RTO: 0 /100 WBC (ref 0–0.2)
PLATELET # BLD AUTO: 219 10*3/MM3 (ref 140–450)
PMV BLD AUTO: 9.7 FL (ref 6–12)
POTASSIUM SERPL-SCNC: 3.6 MMOL/L (ref 3.5–5.2)
RBC # BLD AUTO: 3.09 10*6/MM3 (ref 3.77–5.28)
SODIUM SERPL-SCNC: 134 MMOL/L (ref 136–145)
STRESS TARGET HR: 105 BPM
TIBC SERPL-MCNC: 207 MCG/DL (ref 298–536)
TRANSFERRIN SERPL-MCNC: 139 MG/DL (ref 200–360)
VIT B12 BLD-MCNC: 1136 PG/ML (ref 211–946)
WBC # BLD AUTO: 16.62 10*3/MM3 (ref 3.4–10.8)

## 2021-05-26 PROCEDURE — 84466 ASSAY OF TRANSFERRIN: CPT | Performed by: INTERNAL MEDICINE

## 2021-05-26 PROCEDURE — 82746 ASSAY OF FOLIC ACID SERUM: CPT | Performed by: INTERNAL MEDICINE

## 2021-05-26 PROCEDURE — 25010000002 PIPERACILLIN SOD-TAZOBACTAM PER 1 G: Performed by: NURSE PRACTITIONER

## 2021-05-26 PROCEDURE — 83540 ASSAY OF IRON: CPT | Performed by: INTERNAL MEDICINE

## 2021-05-26 PROCEDURE — 82607 VITAMIN B-12: CPT | Performed by: INTERNAL MEDICINE

## 2021-05-26 PROCEDURE — 80048 BASIC METABOLIC PNL TOTAL CA: CPT | Performed by: INTERNAL MEDICINE

## 2021-05-26 PROCEDURE — 82728 ASSAY OF FERRITIN: CPT | Performed by: INTERNAL MEDICINE

## 2021-05-26 PROCEDURE — 85025 COMPLETE CBC W/AUTO DIFF WBC: CPT | Performed by: INTERNAL MEDICINE

## 2021-05-26 PROCEDURE — 83036 HEMOGLOBIN GLYCOSYLATED A1C: CPT | Performed by: INTERNAL MEDICINE

## 2021-05-26 PROCEDURE — 93975 VASCULAR STUDY: CPT

## 2021-05-26 RX ADMIN — SODIUM CHLORIDE 100 ML/HR: 9 INJECTION, SOLUTION INTRAVENOUS at 18:42

## 2021-05-26 RX ADMIN — ACETAMINOPHEN 650 MG: 325 TABLET, FILM COATED ORAL at 15:46

## 2021-05-26 RX ADMIN — METOPROLOL TARTRATE 12.5 MG: 25 TABLET, FILM COATED ORAL at 15:46

## 2021-05-26 RX ADMIN — ACETAMINOPHEN 650 MG: 325 TABLET, FILM COATED ORAL at 23:21

## 2021-05-26 RX ADMIN — SODIUM CHLORIDE, PRESERVATIVE FREE 10 ML: 5 INJECTION INTRAVENOUS at 10:01

## 2021-05-26 RX ADMIN — TAZOBACTAM SODIUM AND PIPERACILLIN SODIUM 3.38 G: 375; 3 INJECTION, SOLUTION INTRAVENOUS at 10:01

## 2021-05-26 RX ADMIN — TAZOBACTAM SODIUM AND PIPERACILLIN SODIUM 3.38 G: 375; 3 INJECTION, SOLUTION INTRAVENOUS at 18:26

## 2021-05-26 RX ADMIN — ASPIRIN 81 MG: 81 TABLET, COATED ORAL at 21:28

## 2021-05-26 RX ADMIN — SODIUM CHLORIDE, PRESERVATIVE FREE 10 ML: 5 INJECTION INTRAVENOUS at 21:28

## 2021-05-26 RX ADMIN — TAZOBACTAM SODIUM AND PIPERACILLIN SODIUM 3.38 G: 375; 3 INJECTION, SOLUTION INTRAVENOUS at 03:46

## 2021-05-26 RX ADMIN — Medication 1 TABLET: at 10:01

## 2021-05-27 PROBLEM — D50.0 IRON DEFICIENCY ANEMIA DUE TO CHRONIC BLOOD LOSS: Status: ACTIVE | Noted: 2021-05-24

## 2021-05-27 LAB
ALBUMIN SERPL-MCNC: 2.2 G/DL (ref 3.5–5.2)
ALBUMIN/GLOB SERPL: 0.8 G/DL
ALP SERPL-CCNC: 59 U/L (ref 39–117)
ALT SERPL W P-5'-P-CCNC: 9 U/L (ref 1–33)
ANION GAP SERPL CALCULATED.3IONS-SCNC: 5.4 MMOL/L (ref 5–15)
AST SERPL-CCNC: 13 U/L (ref 1–32)
BILIRUB SERPL-MCNC: 0.4 MG/DL (ref 0–1.2)
BUN SERPL-MCNC: 11 MG/DL (ref 8–23)
BUN/CREAT SERPL: 14.9 (ref 7–25)
CALCIUM SPEC-SCNC: 7.8 MG/DL (ref 8.2–9.6)
CHLORIDE SERPL-SCNC: 106 MMOL/L (ref 98–107)
CO2 SERPL-SCNC: 23.6 MMOL/L (ref 22–29)
CREAT SERPL-MCNC: 0.74 MG/DL (ref 0.57–1)
DEPRECATED RDW RBC AUTO: 38.1 FL (ref 37–54)
ERYTHROCYTE [DISTWIDTH] IN BLOOD BY AUTOMATED COUNT: 12.7 % (ref 12.3–15.4)
GFR SERPL CREATININE-BSD FRML MDRD: 73 ML/MIN/1.73
GLOBULIN UR ELPH-MCNC: 2.9 GM/DL
GLUCOSE SERPL-MCNC: 100 MG/DL (ref 65–99)
HCT VFR BLD AUTO: 25.4 % (ref 34–46.6)
HGB BLD-MCNC: 8.6 G/DL (ref 12–15.9)
MCH RBC QN AUTO: 28.2 PG (ref 26.6–33)
MCHC RBC AUTO-ENTMCNC: 33.9 G/DL (ref 31.5–35.7)
MCV RBC AUTO: 83.3 FL (ref 79–97)
PLATELET # BLD AUTO: 284 10*3/MM3 (ref 140–450)
PMV BLD AUTO: 9.3 FL (ref 6–12)
POTASSIUM SERPL-SCNC: 3.4 MMOL/L (ref 3.5–5.2)
PROT SERPL-MCNC: 5.1 G/DL (ref 6–8.5)
RBC # BLD AUTO: 3.05 10*6/MM3 (ref 3.77–5.28)
SODIUM SERPL-SCNC: 135 MMOL/L (ref 136–145)
WBC # BLD AUTO: 11.91 10*3/MM3 (ref 3.4–10.8)

## 2021-05-27 PROCEDURE — 25010000002 PIPERACILLIN SOD-TAZOBACTAM PER 1 G: Performed by: NURSE PRACTITIONER

## 2021-05-27 PROCEDURE — 99232 SBSQ HOSP IP/OBS MODERATE 35: CPT | Performed by: INTERNAL MEDICINE

## 2021-05-27 PROCEDURE — 85027 COMPLETE CBC AUTOMATED: CPT | Performed by: INTERNAL MEDICINE

## 2021-05-27 PROCEDURE — 80053 COMPREHEN METABOLIC PANEL: CPT | Performed by: INTERNAL MEDICINE

## 2021-05-27 RX ORDER — PEG-3350, SODIUM SULFATE, SODIUM CHLORIDE, POTASSIUM CHLORIDE, SODIUM ASCORBATE AND ASCORBIC ACID 7.5-2.691G
1000 KIT ORAL EVERY 12 HOURS SCHEDULED
Status: COMPLETED | OUTPATIENT
Start: 2021-05-27 | End: 2021-05-27

## 2021-05-27 RX ADMIN — TAZOBACTAM SODIUM AND PIPERACILLIN SODIUM 3.38 G: 375; 3 INJECTION, SOLUTION INTRAVENOUS at 03:49

## 2021-05-27 RX ADMIN — TAZOBACTAM SODIUM AND PIPERACILLIN SODIUM 3.38 G: 375; 3 INJECTION, SOLUTION INTRAVENOUS at 12:15

## 2021-05-27 RX ADMIN — METOPROLOL TARTRATE 12.5 MG: 25 TABLET, FILM COATED ORAL at 21:32

## 2021-05-27 RX ADMIN — SODIUM CHLORIDE 100 ML/HR: 9 INJECTION, SOLUTION INTRAVENOUS at 06:25

## 2021-05-27 RX ADMIN — POLYETHYLENE GLYCOL 3350, SODIUM SULFATE, SODIUM CHLORIDE, POTASSIUM CHLORIDE, SODIUM ASCORBATE, AND ASCORBIC ACID 1000 ML: KIT at 18:16

## 2021-05-27 RX ADMIN — Medication 1 TABLET: at 09:03

## 2021-05-27 RX ADMIN — ACETAMINOPHEN 650 MG: 325 TABLET, FILM COATED ORAL at 18:16

## 2021-05-27 RX ADMIN — METOPROLOL TARTRATE 12.5 MG: 25 TABLET, FILM COATED ORAL at 09:03

## 2021-05-27 RX ADMIN — TAZOBACTAM SODIUM AND PIPERACILLIN SODIUM 3.38 G: 375; 3 INJECTION, SOLUTION INTRAVENOUS at 19:02

## 2021-05-27 RX ADMIN — ASPIRIN 81 MG: 81 TABLET, COATED ORAL at 21:32

## 2021-05-27 RX ADMIN — POLYETHYLENE GLYCOL 3350, SODIUM SULFATE, SODIUM CHLORIDE, POTASSIUM CHLORIDE, SODIUM ASCORBATE, AND ASCORBIC ACID 1000 ML: KIT at 21:39

## 2021-05-27 RX ADMIN — SODIUM CHLORIDE, PRESERVATIVE FREE 10 ML: 5 INJECTION INTRAVENOUS at 21:36

## 2021-05-28 ENCOUNTER — APPOINTMENT (OUTPATIENT)
Dept: CT IMAGING | Facility: HOSPITAL | Age: 86
End: 2021-05-28

## 2021-05-28 LAB
ALBUMIN SERPL-MCNC: 2.5 G/DL (ref 3.5–5.2)
ALBUMIN/GLOB SERPL: 0.9 G/DL
ALP SERPL-CCNC: 62 U/L (ref 39–117)
ALT SERPL W P-5'-P-CCNC: 11 U/L (ref 1–33)
ANION GAP SERPL CALCULATED.3IONS-SCNC: 8.4 MMOL/L (ref 5–15)
AST SERPL-CCNC: 16 U/L (ref 1–32)
BASOPHILS # BLD AUTO: 0.03 10*3/MM3 (ref 0–0.2)
BASOPHILS NFR BLD AUTO: 0.3 % (ref 0–1.5)
BILIRUB SERPL-MCNC: 0.4 MG/DL (ref 0–1.2)
BUN SERPL-MCNC: 7 MG/DL (ref 8–23)
BUN/CREAT SERPL: 9.9 (ref 7–25)
CALCIUM SPEC-SCNC: 8.2 MG/DL (ref 8.2–9.6)
CHLORIDE SERPL-SCNC: 112 MMOL/L (ref 98–107)
CO2 SERPL-SCNC: 19.6 MMOL/L (ref 22–29)
CREAT SERPL-MCNC: 0.71 MG/DL (ref 0.57–1)
DEPRECATED RDW RBC AUTO: 39.2 FL (ref 37–54)
EOSINOPHIL # BLD AUTO: 0.15 10*3/MM3 (ref 0–0.4)
EOSINOPHIL NFR BLD AUTO: 1.4 % (ref 0.3–6.2)
ERYTHROCYTE [DISTWIDTH] IN BLOOD BY AUTOMATED COUNT: 13 % (ref 12.3–15.4)
GFR SERPL CREATININE-BSD FRML MDRD: 76 ML/MIN/1.73
GLOBULIN UR ELPH-MCNC: 2.9 GM/DL
GLUCOSE SERPL-MCNC: 118 MG/DL (ref 65–99)
HCT VFR BLD AUTO: 28.8 % (ref 34–46.6)
HGB BLD-MCNC: 9.4 G/DL (ref 12–15.9)
IMM GRANULOCYTES # BLD AUTO: 0.11 10*3/MM3 (ref 0–0.05)
IMM GRANULOCYTES NFR BLD AUTO: 1 % (ref 0–0.5)
INR PPP: 1.18 (ref 0.9–1.1)
LYMPHOCYTES # BLD AUTO: 0.88 10*3/MM3 (ref 0.7–3.1)
LYMPHOCYTES NFR BLD AUTO: 8.3 % (ref 19.6–45.3)
MCH RBC QN AUTO: 27.6 PG (ref 26.6–33)
MCHC RBC AUTO-ENTMCNC: 32.6 G/DL (ref 31.5–35.7)
MCV RBC AUTO: 84.7 FL (ref 79–97)
MONOCYTES # BLD AUTO: 0.78 10*3/MM3 (ref 0.1–0.9)
MONOCYTES NFR BLD AUTO: 7.3 % (ref 5–12)
NEUTROPHILS NFR BLD AUTO: 8.68 10*3/MM3 (ref 1.7–7)
NEUTROPHILS NFR BLD AUTO: 81.7 % (ref 42.7–76)
NRBC BLD AUTO-RTO: 0 /100 WBC (ref 0–0.2)
PLATELET # BLD AUTO: 342 10*3/MM3 (ref 140–450)
PMV BLD AUTO: 9.1 FL (ref 6–12)
POTASSIUM SERPL-SCNC: 3.3 MMOL/L (ref 3.5–5.2)
PROT SERPL-MCNC: 5.4 G/DL (ref 6–8.5)
PROTHROMBIN TIME: 14.8 SECONDS (ref 11.7–14.2)
RBC # BLD AUTO: 3.4 10*6/MM3 (ref 3.77–5.28)
SARS-COV-2 ORF1AB RESP QL NAA+PROBE: NOT DETECTED
SODIUM SERPL-SCNC: 140 MMOL/L (ref 136–145)
WBC # BLD AUTO: 10.63 10*3/MM3 (ref 3.4–10.8)

## 2021-05-28 PROCEDURE — 74174 CTA ABD&PLVS W/CONTRAST: CPT

## 2021-05-28 PROCEDURE — 0 IOPAMIDOL PER 1 ML: Performed by: INTERNAL MEDICINE

## 2021-05-28 PROCEDURE — 80053 COMPREHEN METABOLIC PANEL: CPT | Performed by: INTERNAL MEDICINE

## 2021-05-28 PROCEDURE — 71275 CT ANGIOGRAPHY CHEST: CPT

## 2021-05-28 PROCEDURE — 85610 PROTHROMBIN TIME: CPT | Performed by: INTERNAL MEDICINE

## 2021-05-28 PROCEDURE — U0004 COV-19 TEST NON-CDC HGH THRU: HCPCS | Performed by: INTERNAL MEDICINE

## 2021-05-28 PROCEDURE — 25010000003 POTASSIUM CHLORIDE 10 MEQ/100ML SOLUTION: Performed by: INTERNAL MEDICINE

## 2021-05-28 PROCEDURE — 25010000002 PIPERACILLIN SOD-TAZOBACTAM PER 1 G: Performed by: NURSE PRACTITIONER

## 2021-05-28 PROCEDURE — 85025 COMPLETE CBC W/AUTO DIFF WBC: CPT | Performed by: INTERNAL MEDICINE

## 2021-05-28 PROCEDURE — 99222 1ST HOSP IP/OBS MODERATE 55: CPT | Performed by: SURGERY

## 2021-05-28 RX ORDER — MAGNESIUM CARB/ALUMINUM HYDROX 105-160MG
150 TABLET,CHEWABLE ORAL ONCE
Status: COMPLETED | OUTPATIENT
Start: 2021-05-28 | End: 2021-05-28

## 2021-05-28 RX ORDER — POTASSIUM CHLORIDE 750 MG/1
40 TABLET, FILM COATED, EXTENDED RELEASE ORAL ONCE
Status: COMPLETED | OUTPATIENT
Start: 2021-05-28 | End: 2021-05-28

## 2021-05-28 RX ORDER — POTASSIUM CHLORIDE 7.45 MG/ML
10 INJECTION INTRAVENOUS
Status: DISCONTINUED | OUTPATIENT
Start: 2021-05-28 | End: 2021-06-14 | Stop reason: HOSPADM

## 2021-05-28 RX ORDER — POTASSIUM CHLORIDE 1.5 G/1.77G
40 POWDER, FOR SOLUTION ORAL AS NEEDED
Status: DISCONTINUED | OUTPATIENT
Start: 2021-05-28 | End: 2021-06-14 | Stop reason: HOSPADM

## 2021-05-28 RX ORDER — POTASSIUM CHLORIDE 750 MG/1
40 TABLET, FILM COATED, EXTENDED RELEASE ORAL AS NEEDED
Status: DISCONTINUED | OUTPATIENT
Start: 2021-05-28 | End: 2021-06-14 | Stop reason: HOSPADM

## 2021-05-28 RX ADMIN — TAZOBACTAM SODIUM AND PIPERACILLIN SODIUM 3.38 G: 375; 3 INJECTION, SOLUTION INTRAVENOUS at 11:52

## 2021-05-28 RX ADMIN — TAZOBACTAM SODIUM AND PIPERACILLIN SODIUM 3.38 G: 375; 3 INJECTION, SOLUTION INTRAVENOUS at 20:57

## 2021-05-28 RX ADMIN — POTASSIUM CHLORIDE 10 MEQ: 7.46 INJECTION, SOLUTION INTRAVENOUS at 18:05

## 2021-05-28 RX ADMIN — Medication 1 TABLET: at 08:19

## 2021-05-28 RX ADMIN — POTASSIUM CHLORIDE 10 MEQ: 7.46 INJECTION, SOLUTION INTRAVENOUS at 16:16

## 2021-05-28 RX ADMIN — IOPAMIDOL 95 ML: 755 INJECTION, SOLUTION INTRAVENOUS at 11:47

## 2021-05-28 RX ADMIN — POTASSIUM CHLORIDE 40 MEQ: 750 TABLET, EXTENDED RELEASE ORAL at 08:44

## 2021-05-28 RX ADMIN — SODIUM CHLORIDE, PRESERVATIVE FREE 10 ML: 5 INJECTION INTRAVENOUS at 22:00

## 2021-05-28 RX ADMIN — METOPROLOL TARTRATE 12.5 MG: 25 TABLET, FILM COATED ORAL at 20:58

## 2021-05-28 RX ADMIN — METOPROLOL TARTRATE 12.5 MG: 25 TABLET, FILM COATED ORAL at 08:19

## 2021-05-28 RX ADMIN — SODIUM CHLORIDE 100 ML/HR: 9 INJECTION, SOLUTION INTRAVENOUS at 04:58

## 2021-05-28 RX ADMIN — ASPIRIN 81 MG: 81 TABLET, COATED ORAL at 20:58

## 2021-05-28 RX ADMIN — Medication 150 ML: at 11:50

## 2021-05-28 RX ADMIN — TAZOBACTAM SODIUM AND PIPERACILLIN SODIUM 3.38 G: 375; 3 INJECTION, SOLUTION INTRAVENOUS at 03:58

## 2021-05-29 PROBLEM — E87.6 HYPOKALEMIA: Status: ACTIVE | Noted: 2021-05-29

## 2021-05-29 LAB
ANION GAP SERPL CALCULATED.3IONS-SCNC: 7.6 MMOL/L (ref 5–15)
BACTERIA SPEC AEROBE CULT: NORMAL
BACTERIA SPEC AEROBE CULT: NORMAL
BUN SERPL-MCNC: 4 MG/DL (ref 8–23)
BUN/CREAT SERPL: 5.6 (ref 7–25)
CALCIUM SPEC-SCNC: 7.9 MG/DL (ref 8.2–9.6)
CHLORIDE SERPL-SCNC: 109 MMOL/L (ref 98–107)
CO2 SERPL-SCNC: 21.4 MMOL/L (ref 22–29)
CREAT SERPL-MCNC: 0.71 MG/DL (ref 0.57–1)
DEPRECATED RDW RBC AUTO: 40.2 FL (ref 37–54)
ERYTHROCYTE [DISTWIDTH] IN BLOOD BY AUTOMATED COUNT: 13 % (ref 12.3–15.4)
GFR SERPL CREATININE-BSD FRML MDRD: 76 ML/MIN/1.73
GLUCOSE SERPL-MCNC: 102 MG/DL (ref 65–99)
HCT VFR BLD AUTO: 27.4 % (ref 34–46.6)
HGB BLD-MCNC: 9.1 G/DL (ref 12–15.9)
MCH RBC QN AUTO: 28.1 PG (ref 26.6–33)
MCHC RBC AUTO-ENTMCNC: 33.2 G/DL (ref 31.5–35.7)
MCV RBC AUTO: 84.6 FL (ref 79–97)
PLATELET # BLD AUTO: 321 10*3/MM3 (ref 140–450)
PMV BLD AUTO: 8.4 FL (ref 6–12)
POTASSIUM SERPL-SCNC: 3.3 MMOL/L (ref 3.5–5.2)
POTASSIUM SERPL-SCNC: 4.2 MMOL/L (ref 3.5–5.2)
RBC # BLD AUTO: 3.24 10*6/MM3 (ref 3.77–5.28)
SODIUM SERPL-SCNC: 138 MMOL/L (ref 136–145)
WBC # BLD AUTO: 10.1 10*3/MM3 (ref 3.4–10.8)

## 2021-05-29 PROCEDURE — 25010000002 PIPERACILLIN SOD-TAZOBACTAM PER 1 G: Performed by: NURSE PRACTITIONER

## 2021-05-29 PROCEDURE — 99232 SBSQ HOSP IP/OBS MODERATE 35: CPT | Performed by: INTERNAL MEDICINE

## 2021-05-29 PROCEDURE — 80048 BASIC METABOLIC PNL TOTAL CA: CPT | Performed by: INTERNAL MEDICINE

## 2021-05-29 PROCEDURE — 85027 COMPLETE CBC AUTOMATED: CPT | Performed by: INTERNAL MEDICINE

## 2021-05-29 PROCEDURE — 84132 ASSAY OF SERUM POTASSIUM: CPT | Performed by: INTERNAL MEDICINE

## 2021-05-29 PROCEDURE — 25010000003 POTASSIUM CHLORIDE 10 MEQ/100ML SOLUTION: Performed by: INTERNAL MEDICINE

## 2021-05-29 PROCEDURE — 25810000003 SODIUM CHLORIDE 0.9 % WITH KCL 20 MEQ 20-0.9 MEQ/L-% SOLUTION: Performed by: NURSE PRACTITIONER

## 2021-05-29 PROCEDURE — 99231 SBSQ HOSP IP/OBS SF/LOW 25: CPT | Performed by: SURGERY

## 2021-05-29 RX ORDER — SODIUM CHLORIDE AND POTASSIUM CHLORIDE 150; 900 MG/100ML; MG/100ML
50 INJECTION, SOLUTION INTRAVENOUS CONTINUOUS
Status: DISCONTINUED | OUTPATIENT
Start: 2021-05-29 | End: 2021-06-02

## 2021-05-29 RX ADMIN — POTASSIUM CHLORIDE 10 MEQ: 7.46 INJECTION, SOLUTION INTRAVENOUS at 07:03

## 2021-05-29 RX ADMIN — SODIUM CHLORIDE 100 ML/HR: 9 INJECTION, SOLUTION INTRAVENOUS at 08:26

## 2021-05-29 RX ADMIN — TAZOBACTAM SODIUM AND PIPERACILLIN SODIUM 3.38 G: 375; 3 INJECTION, SOLUTION INTRAVENOUS at 04:23

## 2021-05-29 RX ADMIN — SODIUM CHLORIDE, PRESERVATIVE FREE 10 ML: 5 INJECTION INTRAVENOUS at 20:25

## 2021-05-29 RX ADMIN — POTASSIUM CHLORIDE AND SODIUM CHLORIDE 100 ML/HR: 900; 150 INJECTION, SOLUTION INTRAVENOUS at 15:11

## 2021-05-29 RX ADMIN — METOPROLOL TARTRATE 12.5 MG: 25 TABLET, FILM COATED ORAL at 20:24

## 2021-05-29 RX ADMIN — ASPIRIN 81 MG: 81 TABLET, COATED ORAL at 20:24

## 2021-05-29 RX ADMIN — POTASSIUM CHLORIDE 40 MEQ: 1.5 POWDER, FOR SOLUTION ORAL at 08:19

## 2021-05-29 RX ADMIN — TAZOBACTAM SODIUM AND PIPERACILLIN SODIUM 3.38 G: 375; 3 INJECTION, SOLUTION INTRAVENOUS at 18:00

## 2021-05-29 RX ADMIN — SODIUM CHLORIDE, PRESERVATIVE FREE 10 ML: 5 INJECTION INTRAVENOUS at 08:20

## 2021-05-29 RX ADMIN — METOPROLOL TARTRATE 12.5 MG: 25 TABLET, FILM COATED ORAL at 08:20

## 2021-05-29 RX ADMIN — TAZOBACTAM SODIUM AND PIPERACILLIN SODIUM 3.38 G: 375; 3 INJECTION, SOLUTION INTRAVENOUS at 12:00

## 2021-05-29 RX ADMIN — POTASSIUM CHLORIDE 40 MEQ: 1.5 POWDER, FOR SOLUTION ORAL at 12:11

## 2021-05-30 LAB
ANION GAP SERPL CALCULATED.3IONS-SCNC: 10.5 MMOL/L (ref 5–15)
BUN SERPL-MCNC: 3 MG/DL (ref 8–23)
BUN/CREAT SERPL: 5.6 (ref 7–25)
CALCIUM SPEC-SCNC: 7.9 MG/DL (ref 8.2–9.6)
CHLORIDE SERPL-SCNC: 105 MMOL/L (ref 98–107)
CO2 SERPL-SCNC: 20.5 MMOL/L (ref 22–29)
CREAT SERPL-MCNC: 0.54 MG/DL (ref 0.57–1)
DEPRECATED RDW RBC AUTO: 38.7 FL (ref 37–54)
ERYTHROCYTE [DISTWIDTH] IN BLOOD BY AUTOMATED COUNT: 13 % (ref 12.3–15.4)
GFR SERPL CREATININE-BSD FRML MDRD: 105 ML/MIN/1.73
GLUCOSE SERPL-MCNC: 81 MG/DL (ref 65–99)
HCT VFR BLD AUTO: 29 % (ref 34–46.6)
HGB BLD-MCNC: 9.5 G/DL (ref 12–15.9)
MAGNESIUM SERPL-MCNC: 2 MG/DL (ref 1.7–2.3)
MCH RBC QN AUTO: 27.1 PG (ref 26.6–33)
MCHC RBC AUTO-ENTMCNC: 32.8 G/DL (ref 31.5–35.7)
MCV RBC AUTO: 82.9 FL (ref 79–97)
PLATELET # BLD AUTO: 383 10*3/MM3 (ref 140–450)
PMV BLD AUTO: 8.5 FL (ref 6–12)
POTASSIUM SERPL-SCNC: 4 MMOL/L (ref 3.5–5.2)
RBC # BLD AUTO: 3.5 10*6/MM3 (ref 3.77–5.28)
SODIUM SERPL-SCNC: 136 MMOL/L (ref 136–145)
WBC # BLD AUTO: 10.76 10*3/MM3 (ref 3.4–10.8)

## 2021-05-30 PROCEDURE — 99231 SBSQ HOSP IP/OBS SF/LOW 25: CPT | Performed by: SURGERY

## 2021-05-30 PROCEDURE — 25010000002 PIPERACILLIN SOD-TAZOBACTAM PER 1 G: Performed by: NURSE PRACTITIONER

## 2021-05-30 PROCEDURE — 25810000003 SODIUM CHLORIDE 0.9 % WITH KCL 20 MEQ 20-0.9 MEQ/L-% SOLUTION: Performed by: NURSE PRACTITIONER

## 2021-05-30 PROCEDURE — 85027 COMPLETE CBC AUTOMATED: CPT | Performed by: NURSE PRACTITIONER

## 2021-05-30 PROCEDURE — 80048 BASIC METABOLIC PNL TOTAL CA: CPT | Performed by: NURSE PRACTITIONER

## 2021-05-30 PROCEDURE — 83735 ASSAY OF MAGNESIUM: CPT | Performed by: NURSE PRACTITIONER

## 2021-05-30 RX ADMIN — METOPROLOL TARTRATE 12.5 MG: 25 TABLET, FILM COATED ORAL at 20:06

## 2021-05-30 RX ADMIN — SODIUM CHLORIDE, PRESERVATIVE FREE 10 ML: 5 INJECTION INTRAVENOUS at 20:06

## 2021-05-30 RX ADMIN — SODIUM CHLORIDE, PRESERVATIVE FREE 10 ML: 5 INJECTION INTRAVENOUS at 08:55

## 2021-05-30 RX ADMIN — TAZOBACTAM SODIUM AND PIPERACILLIN SODIUM 3.38 G: 375; 3 INJECTION, SOLUTION INTRAVENOUS at 02:57

## 2021-05-30 RX ADMIN — POTASSIUM CHLORIDE AND SODIUM CHLORIDE 100 ML/HR: 900; 150 INJECTION, SOLUTION INTRAVENOUS at 01:00

## 2021-05-30 RX ADMIN — POTASSIUM CHLORIDE AND SODIUM CHLORIDE 50 ML/HR: 900; 150 INJECTION, SOLUTION INTRAVENOUS at 12:23

## 2021-05-30 RX ADMIN — TAZOBACTAM SODIUM AND PIPERACILLIN SODIUM 3.38 G: 375; 3 INJECTION, SOLUTION INTRAVENOUS at 19:01

## 2021-05-30 RX ADMIN — METOPROLOL TARTRATE 12.5 MG: 25 TABLET, FILM COATED ORAL at 08:55

## 2021-05-30 RX ADMIN — TAZOBACTAM SODIUM AND PIPERACILLIN SODIUM 3.38 G: 375; 3 INJECTION, SOLUTION INTRAVENOUS at 10:31

## 2021-05-30 RX ADMIN — ASPIRIN 81 MG: 81 TABLET, COATED ORAL at 20:06

## 2021-05-31 ENCOUNTER — APPOINTMENT (OUTPATIENT)
Dept: CT IMAGING | Facility: HOSPITAL | Age: 86
End: 2021-05-31

## 2021-05-31 PROBLEM — K63.89 CECUM MASS: Status: ACTIVE | Noted: 2021-05-31

## 2021-05-31 LAB
ANION GAP SERPL CALCULATED.3IONS-SCNC: 9.1 MMOL/L (ref 5–15)
BUN SERPL-MCNC: 3 MG/DL (ref 8–23)
BUN/CREAT SERPL: 5.3 (ref 7–25)
CALCIUM SPEC-SCNC: 7.7 MG/DL (ref 8.2–9.6)
CEA SERPL-MCNC: 14.1 NG/ML
CHLORIDE SERPL-SCNC: 104 MMOL/L (ref 98–107)
CO2 SERPL-SCNC: 22.9 MMOL/L (ref 22–29)
CREAT SERPL-MCNC: 0.57 MG/DL (ref 0.57–1)
DEPRECATED RDW RBC AUTO: 39 FL (ref 37–54)
ERYTHROCYTE [DISTWIDTH] IN BLOOD BY AUTOMATED COUNT: 13 % (ref 12.3–15.4)
GFR SERPL CREATININE-BSD FRML MDRD: 98 ML/MIN/1.73
GLUCOSE SERPL-MCNC: 109 MG/DL (ref 65–99)
HCT VFR BLD AUTO: 26.6 % (ref 34–46.6)
HGB BLD-MCNC: 8.8 G/DL (ref 12–15.9)
MCH RBC QN AUTO: 27.2 PG (ref 26.6–33)
MCHC RBC AUTO-ENTMCNC: 33.1 G/DL (ref 31.5–35.7)
MCV RBC AUTO: 82.4 FL (ref 79–97)
PLATELET # BLD AUTO: 343 10*3/MM3 (ref 140–450)
PMV BLD AUTO: 8.7 FL (ref 6–12)
POTASSIUM SERPL-SCNC: 3.3 MMOL/L (ref 3.5–5.2)
RBC # BLD AUTO: 3.23 10*6/MM3 (ref 3.77–5.28)
SODIUM SERPL-SCNC: 136 MMOL/L (ref 136–145)
WBC # BLD AUTO: 9.02 10*3/MM3 (ref 3.4–10.8)

## 2021-05-31 PROCEDURE — 80048 BASIC METABOLIC PNL TOTAL CA: CPT | Performed by: NURSE PRACTITIONER

## 2021-05-31 PROCEDURE — 85027 COMPLETE CBC AUTOMATED: CPT | Performed by: NURSE PRACTITIONER

## 2021-05-31 PROCEDURE — 99231 SBSQ HOSP IP/OBS SF/LOW 25: CPT | Performed by: SURGERY

## 2021-05-31 PROCEDURE — 25010000002 IOPAMIDOL 61 % SOLUTION: Performed by: HOSPITALIST

## 2021-05-31 PROCEDURE — 25010000002 PIPERACILLIN SOD-TAZOBACTAM PER 1 G: Performed by: NURSE PRACTITIONER

## 2021-05-31 PROCEDURE — 74177 CT ABD & PELVIS W/CONTRAST: CPT

## 2021-05-31 PROCEDURE — 0 DIATRIZOATE MEGLUMINE & SODIUM PER 1 ML: Performed by: SURGERY

## 2021-05-31 PROCEDURE — 82378 CARCINOEMBRYONIC ANTIGEN: CPT | Performed by: HOSPITALIST

## 2021-05-31 RX ADMIN — POTASSIUM CHLORIDE 40 MEQ: 750 TABLET, EXTENDED RELEASE ORAL at 15:42

## 2021-05-31 RX ADMIN — DIATRIZOATE MEGLUMINE AND DIATRIZOATE SODIUM 30 ML: 600; 100 SOLUTION ORAL; RECTAL at 11:20

## 2021-05-31 RX ADMIN — SODIUM CHLORIDE, PRESERVATIVE FREE 10 ML: 5 INJECTION INTRAVENOUS at 20:54

## 2021-05-31 RX ADMIN — TAZOBACTAM SODIUM AND PIPERACILLIN SODIUM 3.38 G: 375; 3 INJECTION, SOLUTION INTRAVENOUS at 18:29

## 2021-05-31 RX ADMIN — METOPROLOL TARTRATE 12.5 MG: 25 TABLET, FILM COATED ORAL at 09:12

## 2021-05-31 RX ADMIN — Medication 1 TABLET: at 09:12

## 2021-05-31 RX ADMIN — ASPIRIN 81 MG: 81 TABLET, COATED ORAL at 20:54

## 2021-05-31 RX ADMIN — IOPAMIDOL 85 ML: 612 INJECTION, SOLUTION INTRAVENOUS at 13:01

## 2021-05-31 RX ADMIN — SODIUM CHLORIDE, PRESERVATIVE FREE 10 ML: 5 INJECTION INTRAVENOUS at 09:13

## 2021-05-31 RX ADMIN — METOPROLOL TARTRATE 12.5 MG: 25 TABLET, FILM COATED ORAL at 20:54

## 2021-05-31 RX ADMIN — POTASSIUM CHLORIDE 40 MEQ: 750 TABLET, EXTENDED RELEASE ORAL at 20:54

## 2021-05-31 RX ADMIN — TAZOBACTAM SODIUM AND PIPERACILLIN SODIUM 3.38 G: 375; 3 INJECTION, SOLUTION INTRAVENOUS at 02:36

## 2021-05-31 RX ADMIN — TAZOBACTAM SODIUM AND PIPERACILLIN SODIUM 3.38 G: 375; 3 INJECTION, SOLUTION INTRAVENOUS at 11:20

## 2021-06-01 LAB
ANION GAP SERPL CALCULATED.3IONS-SCNC: 5 MMOL/L (ref 5–15)
BASOPHILS # BLD AUTO: 0.04 10*3/MM3 (ref 0–0.2)
BASOPHILS NFR BLD AUTO: 0.4 % (ref 0–1.5)
BUN SERPL-MCNC: 3 MG/DL (ref 8–23)
BUN/CREAT SERPL: 5 (ref 7–25)
CALCIUM SPEC-SCNC: 8 MG/DL (ref 8.2–9.6)
CHLORIDE SERPL-SCNC: 101 MMOL/L (ref 98–107)
CO2 SERPL-SCNC: 27 MMOL/L (ref 22–29)
CREAT SERPL-MCNC: 0.6 MG/DL (ref 0.57–1)
DEPRECATED RDW RBC AUTO: 40.4 FL (ref 37–54)
EOSINOPHIL # BLD AUTO: 0.2 10*3/MM3 (ref 0–0.4)
EOSINOPHIL NFR BLD AUTO: 2 % (ref 0.3–6.2)
ERYTHROCYTE [DISTWIDTH] IN BLOOD BY AUTOMATED COUNT: 13.4 % (ref 12.3–15.4)
GFR SERPL CREATININE-BSD FRML MDRD: 93 ML/MIN/1.73
GLUCOSE SERPL-MCNC: 86 MG/DL (ref 65–99)
HCT VFR BLD AUTO: 27.7 % (ref 34–46.6)
HGB BLD-MCNC: 9.3 G/DL (ref 12–15.9)
IMM GRANULOCYTES # BLD AUTO: 0.16 10*3/MM3 (ref 0–0.05)
IMM GRANULOCYTES NFR BLD AUTO: 1.6 % (ref 0–0.5)
LYMPHOCYTES # BLD AUTO: 1.02 10*3/MM3 (ref 0.7–3.1)
LYMPHOCYTES NFR BLD AUTO: 10.1 % (ref 19.6–45.3)
MCH RBC QN AUTO: 27.8 PG (ref 26.6–33)
MCHC RBC AUTO-ENTMCNC: 33.6 G/DL (ref 31.5–35.7)
MCV RBC AUTO: 82.7 FL (ref 79–97)
MONOCYTES # BLD AUTO: 0.84 10*3/MM3 (ref 0.1–0.9)
MONOCYTES NFR BLD AUTO: 8.3 % (ref 5–12)
NEUTROPHILS NFR BLD AUTO: 7.88 10*3/MM3 (ref 1.7–7)
NEUTROPHILS NFR BLD AUTO: 77.6 % (ref 42.7–76)
NRBC BLD AUTO-RTO: 0 /100 WBC (ref 0–0.2)
PLATELET # BLD AUTO: 318 10*3/MM3 (ref 140–450)
PMV BLD AUTO: 8.4 FL (ref 6–12)
POTASSIUM SERPL-SCNC: 4.1 MMOL/L (ref 3.5–5.2)
RBC # BLD AUTO: 3.35 10*6/MM3 (ref 3.77–5.28)
SARS-COV-2 ORF1AB RESP QL NAA+PROBE: NOT DETECTED
SODIUM SERPL-SCNC: 133 MMOL/L (ref 136–145)
WBC # BLD AUTO: 10.14 10*3/MM3 (ref 3.4–10.8)

## 2021-06-01 PROCEDURE — 85025 COMPLETE CBC W/AUTO DIFF WBC: CPT | Performed by: HOSPITALIST

## 2021-06-01 PROCEDURE — U0004 COV-19 TEST NON-CDC HGH THRU: HCPCS | Performed by: SURGERY

## 2021-06-01 PROCEDURE — 99231 SBSQ HOSP IP/OBS SF/LOW 25: CPT | Performed by: SURGERY

## 2021-06-01 PROCEDURE — 80048 BASIC METABOLIC PNL TOTAL CA: CPT | Performed by: HOSPITALIST

## 2021-06-01 RX ADMIN — Medication 1 TABLET: at 09:40

## 2021-06-01 RX ADMIN — METOPROLOL TARTRATE 12.5 MG: 25 TABLET, FILM COATED ORAL at 20:46

## 2021-06-01 RX ADMIN — SODIUM CHLORIDE, PRESERVATIVE FREE 10 ML: 5 INJECTION INTRAVENOUS at 20:46

## 2021-06-01 RX ADMIN — SODIUM CHLORIDE, PRESERVATIVE FREE 10 ML: 5 INJECTION INTRAVENOUS at 09:41

## 2021-06-01 RX ADMIN — METOPROLOL TARTRATE 12.5 MG: 25 TABLET, FILM COATED ORAL at 09:40

## 2021-06-01 NOTE — PROGRESS NOTES
Chief Complaint:    Cecal mass    Subjective:    The patient is feeling well except for frequent diarrhea.    Objective:    Temp:  [97.7 °F (36.5 °C)-98.2 °F (36.8 °C)] 97.9 °F (36.6 °C)  Heart Rate:  [75-98] 96  Resp:  [16] 16  BP: (127-145)/(60-73) 127/60    Physical Exam  Constitutional:       Appearance: She is not ill-appearing or toxic-appearing.   Neurological:      Mental Status: She is alert.   Psychiatric:         Behavior: Behavior is cooperative.         Results:    WBC is 10.14.  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  We will plan to proceed with a right hemicolectomy tomorrow.  The patient understands the indications, alternatives, risks, and benefits of the procedure and wishes to proceed.    Baldemar Griffin Jr., M.D.

## 2021-06-01 NOTE — PAYOR COMM NOTE
"Nanci Bridges (89 y.o. Female)     PLEASE SEE ATTACHED FOR CONTINUED STAY AUTH AND DAYS.     REF#[id number]    PLEASE CALL   OR  [phone] WITH CONTINUED STAY AUTH.     THANK YOU    DOREEN ADAMS LPN CCP    Date of Birth Social Security Number Address Home Phone MRN    09/06/1924  [id number]  [facility] [phone] [id number]    [employer] Marital Status          Hindu        Admission Date Admission Type Admitting Provider Attending Provider Department, Room/Bed    5/24/21 Emergency Samantha Leon MD Snyder, Perry, MD [address], S615/1    Discharge Date Discharge Disposition Discharge Destination                       Attending Provider: Alton Bright MD    Allergies: No Known Allergies    Isolation: None   Infection: None   Code Status: No CPR    Ht: 162.6 cm (64.02\")   Wt: 54.9 kg (121 lb)    Admission Cmt: None   Principal Problem: Colitis [K52.9]                 Active Insurance as of 5/24/2021     Primary Coverage     Payor Plan Insurance Group Employer/Plan Group    AETNA MEDICARE REPLACEMENT AETNA MEDICARE REPLACEMENT [id number]     Payor Plan Address Payor Plan Phone Number Payor Plan Fax Number Effective Dates    [address] [phone]  1/1/2018 - None Entered    [facility] [address]       Subscriber Name Subscriber Birth Date Member ID       NANCI BRIDGES 9/6/1924 [id number]                 Emergency Contacts      (Rel.) Home Phone Work Phone Mobile Phone    Sejal Muñoz (Daughter) [phone] -- [phone]    CrystalKayden (Son) [phone] -- [phone]    Magda Durham (Daughter) [phone] -- [phone]    Malena Bridges (Other) [phone] -- [phone]            Oxygen Therapy (last 2 days)     Date/Time   SpO2   Device (Oxygen Therapy)   Flow (L/min)   Oxygen Concentration (%)   ETCO2 (mmHg)    06/01/21 0943   --   room air   --   --   --    06/01/21 0700   95   --   " --   --   --    05/31/21 2343   95   room air   --   --   --    05/31/21 2054   --   room air   --   --   --    05/31/21 1951   95   room air   --   --   --    05/31/21 1355   97   room air   --   --   --    05/31/21 0800   --   room air   --   --   --    05/31/21 0713   97   room air   --   --   --    05/31/21 0238   --   room air   --   --   --    05/31/21 0033   96   room air   --   --   --    05/31/21 0000   --   room air   --   --   --    05/30/21 2000   --   room air   --   --   --    05/30/21 1926   97   room air   --   --   --    05/30/21 1403   --   room air   --   --   --    05/30/21 1313   96   room air   --   --   --    05/30/21 0837   --   room air   --   --   --    05/30/21 0730   99   room air   --   --   --    05/30/21 0000   --   room air   --   --   --            Intake & Output (last 2 days)       05/30 0701 - 05/31 0700 05/31 0701 - 06/01 0700 06/01 0701 - 06/02 0700    P.O. 360  210    I.V. (mL/kg)       IV Piggyback  100     Total Intake(mL/kg) 360 (6.6) 100 (1.8) 210 (3.8)    Urine (mL/kg/hr)       Stool       Total Output       Net +360 +100 +210           Urine Unmeasured Occurrence 3 x 5 x     Stool Unmeasured Occurrence 4 x 4 x         Lines, Drains & Airways    Active LDAs     Name:   Placement date:   Placement time:   Site:   Days:    Peripheral IV 05/31/21 0843 Anterior;Right Forearm   05/31/21    0843    Forearm   1         Inactive LDAs     Name:   Placement date:   Placement time:   Removal date:   Removal time:   Site:   Days:    [REMOVED] Peripheral IV 05/24/21 1720 Right Antecubital   05/24/21    1720    05/26/21    --    Antecubital   1    [REMOVED] Peripheral IV 05/26/21 1238 Right Forearm   05/26/21    1238    05/27/21    0615    Forearm   less than 1    [REMOVED] Peripheral IV 05/27/21 0620 Anterior;Distal;Right Forearm   05/27/21 0620 05/27/21 2000    Forearm   less than 1    [REMOVED] Peripheral IV 05/27/21 2010 Anterior;Right Forearm   05/27/21 2010 05/28/21         Forearm   less than 1    [REMOVED] Peripheral IV 21 Posterior;Right Forearm   21    19321    0830    Forearm   2    [REMOVED] External Urinary Catheter   21    0015    21    0920    --   less than 1                       Physician Progress Notes (last 24 hours) (Notes from 21 1042 through 21 1042)      Baldemar Griffin Jr., MD at 21 1631        Chief Complaint:    Cecal mass    Subjective:    The patient is feeling well with no abdominal complaints except for diarrhea.  She had a repeat CT scan of the abdomen and pelvis today that shows no evidence of colitis but does show a large cecal mass.    Objective:    Temp:  [98.2 °F (36.8 °C)-98.6 °F (37 °C)] 98.2 °F (36.8 °C)  Heart Rate:  [69-96] 81  Resp:  [16-18] 18  BP: (122-138)/(64-72) 132/69    Physical Exam  Constitutional:       Appearance: She is not ill-appearing or toxic-appearing.   Abdominal:      Palpations: Abdomen is soft.      Tenderness: There is no abdominal tenderness.   Neurological:      Mental Status: She is alert.   Psychiatric:         Behavior: Behavior is cooperative.         Results:    WBC is 9.02.  H/H is 8.8/26.6.    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  We will plan to proceed with a right hemicolectomy on 2021.    Baldemar Griffin Jr., M.D.    Electronically signed by Baldemar Griffin Jr., MD at 21 1633     Joaquin Dorsey MD at 21 1611          DAILY PROGRESS NOTE  [facility]    Patient Identification:  Name: Jaqueline Rojas  Age: 89 y.o.  Sex: female  :  1924  MRN: [id number]         Primary Care Physician: Provider, No Known    Subjective:  Interval History:Whit complains of diarrhea.    Objective:    Scheduled Meds:aspirin, 81 mg, Oral, Daily  digoxin, 0.5 mg, Intravenous, Once  metoprolol tartrate, 12.5 mg, Oral, Q12H  multivitamin, 1 tablet, Oral, Daily  piperacillin-tazobactam, 3.375  "g, Intravenous, Q8H  sodium chloride, 10 mL, Intravenous, Q12H      Continuous Infusions:sodium chloride 0.9 % with KCl 20 mEq, 50 mL/hr, Last Rate: 50 mL/hr (05/30/21 1223)        Vital signs in last 24 hours:  Temp:  [98.2 °F (36.8 °C)-98.6 °F (37 °C)] 98.2 °F (36.8 °C)  Heart Rate:  [69-96] 81  Resp:  [16-18] 18  BP: (122-138)/(64-72) 132/69    Intake/Output:    Intake/Output Summary (Last 24 hours) at 5/31/2021 1611  Last data filed at 5/31/2021 1120  Gross per 24 hour   Intake 290 ml   Output --   Net 290 ml       Exam:  /69 (BP Location: Right arm, Patient Position: Lying)   Pulse 81   Temp 98.2 °F (36.8 °C) (Oral)   Resp 18   Ht 162.6 cm (64.02\")   Wt 54.9 kg (121 lb) Comment: not weighed by RD  SpO2 97%   BMI 20.76 kg/m²     General Appearance:    Alert, cooperative, no distress   Head:    Normocephalic, without obvious abnormality, atraumatic   Eyes:       Throat:   Lips, tongue, gums normal   Neck:   Supple, symmetrical, trachea midline, no JVD   Lungs:     Clear to auscultation bilaterally, respirations unlabored   Chest Wall:    No tenderness or deformity    Heart:    Regular rate and rhythm, S1 and S2 normal, no murmur,no  Rub or gallop   Abdomen:     Soft, nontender, bowel sounds active, no masses, no organomegaly    Extremities:   Extremities normal, atraumatic, no cyanosis or edema   Pulses:      Skin:   Skin is warm and dry,  no rashes or palpable lesions   Neurologic:   no focal deficits noted      Lab Results (last 72 hours)     Procedure Component Value Units Date/Time    Basic Metabolic Panel [[id number]]  (Abnormal) Collected: 05/31/21 0449    Specimen: Blood Updated: 05/31/21 0609     Glucose 109 mg/dL      BUN 3 mg/dL      Creatinine 0.57 mg/dL      Sodium 136 mmol/L      Potassium 3.3 mmol/L      Chloride 104 mmol/L      CO2 22.9 mmol/L      Calcium 7.7 mg/dL      eGFR Non African Amer 98 mL/min/1.73      BUN/Creatinine Ratio 5.3     Anion Gap 9.1 mmol/L     Narrative:       Data " Review:  Lab Units 05/31/21  0449   SODIUM mmol/L 136   POTASSIUM mmol/L 3.3*   CHLORIDE mmol/L 104   CO2 mmol/L 22.9   BUN mg/dL 3*   CREATININE mg/dL 0.57   GLUCOSE mg/dL 109*   CALCIUM mg/dL 7.7*     Lab Units 05/31/21  0449   WBC 10*3/mm3 9.02   HEMOGLOBIN g/dL 8.8*   HEMATOCRIT % 26.6*   PLATELETS 10*3/mm3 343       Past Medical History:   Diagnosis Date   • Afib (CMS/HCC)    • CANCINO (dyspnea on exertion)    • Frequent PVCs     multifocal   • Hypotension    • Irregular heart beat    • Knee injury    • Mitral regurgitation     Moderate to Severe per Echocardiogram 8/2016   • Palpitations    • Supraventricular tachycardia (CMS/HCC)    • Wrist fracture        Assessment:  Active Hospital Problems    Diagnosis  POA   • **Colitis [K52.9]  Yes   • 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]  Unknown   • Hypokalemia [E87.6]  Unknown   • 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       Plan:  Report of repeat CT scan and plans for surgery Wednesday. Follow lab.    Joaquin Dorsey MD  5/31/2021  16:11 EDT    Electronically signed by Joaquin Dorsey MD at 05/31/21 1613       All medication doses during the admission are shown, including meds that are no longer on order.  Scheduled Meds Sorted by Name  for Jaqueline Rojas as of 5/30/21 through 6/1/21    1 Day 3 Days 7 Days 10 Days < Today >    Legend:                          Inactive     Active     Other Encounter     Linked                 Medications 05/30/21 05/31/21 06/01/21   aspirin EC tablet 81 mg   Dose: 81 mg  Freq: Daily Route: PO  Start: 05/25/21 0900    Admin Instructions:   Herbal/drug interaction:  Avoid use with ginkgo biloba. Do not crush or chew.  Do not exceed 4 grams of aspirin in a 24 hr period.    If given for pain, use the following pain scale:   Mild Pain = Pain Score of 1-3, CPOT 1-2  Moderate Pain = Pain Score of 4-6, CPOT 3-4  Severe Pain = Pain Score of 7-10, CPOT 5-8    2006 2054 2100            carvedilol (COREG) tablet 3.125 mg   Dose: 3.125 mg  Freq: 2 Times Daily With Meals Route: PO  Start: 05/25/21 1800 End: 05/26/21 1415    Admin Instructions:   Give with food.         carvedilol (COREG) tablet 3.125 mg   Dose: 3.125 mg  Freq: Every Morning Route: PO  Start: 05/25/21 0700 End: 05/25/21 1303    Admin Instructions:   Give with food.         carvedilol (COREG) tablet 6.25 mg   Dose: 6.25 mg  Freq: Every Evening Route: PO  Start: 05/24/21 2345 End: 05/25/21 1303    Admin Instructions:   Give with food.         carvedilol (COREG) tablet 6.25 mg   Dose: 6.25 mg  Freq: Every Evening Route: PO  Start: 05/25/21 1700 End: 05/24/21 2259    Admin Instructions:   Give with food.         diatrizoate meglumine-sodium (GASTROGRAFIN) 66-10 % solution 30 mL   Dose: 30 mL  Freq: Once Route: PO  Start: 05/31/21 1130 End: 05/31/21 1120     1120             digoxin (LANOXIN) injection 0.5 mg   Dose: 0.5 mg  Freq: Once Route: IV  Start: 05/25/21 1530    Admin Instructions:    Check and record heart rate. Use filter needle to withdraw dose from ampule. Dose may be pushed over 5 minutes.         iopamidol (ISOVUE-300) 61 % injection 100 mL   Dose: 100 mL  Freq: Once in Imaging Route: IV  Start: 05/31/21 1400 End: 05/31/21 1301     1301             iopamidol (ISOVUE-300) 61 % injection 100 mL   Dose: 100 mL  Freq: Once in Imaging Route: IV  Start: 05/24/21 1933 End: 05/1934         iopamidol (ISOVUE-370) 76 % injection 100 mL   Dose: 100 mL  Freq: Once in Imaging Route: IV  Start: 05/28/21 1245 End: 05/28/21 1147         magnesium citrate solution 150 mL   Dose: 150 mL  Freq: Once Route:  PO  Start: 05/28/21 0930 End: 05/28/21 1150         metoprolol tartrate (LOPRESSOR) tablet 12.5 mg   Dose: 12.5 mg  Freq: Every 12 Hours Scheduled Route: PO  Start: 05/26/21 1700 0855 2006 0915   2054 0940 2100          metoprolol tartrate (LOPRESSOR) tablet 12.5 mg   Dose: 12.5 mg  Freq: Every 12 Hours Scheduled Route: PO  Start: 05/26/21 2100 End: 05/26/21 1524         morphine injection 2 mg   Dose: 2 mg  Freq: Once Route: IV  Start: 05/24/21 1704 End: 05/24/21 1720    Admin Instructions:   If given for pain, use the following pain scale:  Mild Pain = Pain Score of 1-3, CPOT 1-2  Moderate Pain = Pain Score of 4-6, CPOT 3-4  Severe Pain = Pain Score of 7-10, CPOT 5-8         multivitamin (THERAGRAN) tablet 1 tablet   Dose: 1 tablet  Freq: Daily Route: PO  Start: 05/25/21 0900    Admin Instructions:       (6800) 1787          0967            ondansetron (ZOFRAN) injection 4 mg   Dose: 4 mg  Freq: Once Route: IV  Start: 05/24/21 1704 End: 05/24/21 1720         PEG-KCl-NaCl-NaSulf-Na Asc-C (MOVIPREP) powder 1,000 mL   Dose: 1,000 mL  Freq: Every 12 Hours Scheduled Route: PO  Start: 05/27/21 1700 End: 05/27/21 2139    Admin Instructions:   Empty packet A and packet B into the disposable container. Add lukewarm drinking water to the top line of the container. Mix to dissolve. Drink 8 ounces every 15 minutes until the entire contents of the container are consumed (approximately one hour). Then drink an additional 16 ounces of clear liquid. Repeat dosing regimen once more as ordered by provider. For inpatient bowel prep: Give first dose 1000mL (one container) 3 1/2 hours before bedtime and 2nd dose 1000 mL (once container) at least 3 1/2 hours prior to procedure. Adjust administration times as indicated.                   piperacillin-tazobactam (ZOSYN) 3.375 g in iso-osmotic dextrose 50 ml (premix)   Dose: 3.375 g  Freq: Every 8 Hours Route: IV  Indications of Use: SEPSIS  Start: 05/25/21  0300 End: 05/31/21 2229    Admin Instructions:   Refrigerate    0257   1031   1901      0236   1120   1829         piperacillin-tazobactam (ZOSYN) 3.375 g in iso-osmotic dextrose 50 ml (premix)   Dose: 3.375 g  Freq: Once Route: IV  Indications of Use: SEPSIS  Last Dose: Stopped (05/24/21 2144)  Start: 05/24/21 2030 End: 05/24/21 2144    Admin Instructions:   Refrigerate         potassium chloride (K-DUR,KLOR-CON) ER tablet 40 mEq   Dose: 40 mEq  Freq: Once Route: PO  Start: 05/28/21 0930 End: 05/28/21 0844    Admin Instructions:   Swallow whole; do not crush, split, or chew.         sodium chloride 0.9 % flush 10 mL   Dose: 10 mL  Freq: Every 12 Hours Scheduled Route: IV  Start: 05/25/21 0030    0855   2006        0913   2054        0941   2100          Medications 05/30/21 05/31/21 06/01/21       Continuous Meds Sorted by Name  for Jaqueline Rojas as of 5/30/21 through 6/1/21   Legend:                          Inactive     Active     Other Encounter     Linked                 Medications 05/30/21 05/31/21 06/01/21   Pharmacy to Dose Zosyn   Freq: Continuous PRN Route: XX  PRN Reason: Consult  Indications of Use: SEPSIS  Start: 05/24/21 2338 End: 05/25/21 0809         sodium chloride 0.9 % infusion   Rate: 100 mL/hr Dose: 100 mL/hr  Freq: Continuous Route: IV  Last Dose: 100 mL/hr (05/29/21 0826)  Start: 05/25/21 0030 End: 05/29/21 1439         sodium chloride 0.9 % with KCl 20 mEq/L infusion   Rate: 50 mL/hr Dose: 50 mL/hr  Freq: Continuous Route: IV  Last Dose: 50 mL/hr (05/30/21 1223)  Start: 05/29/21 1530    0100   1222   1223              PRN Meds Sorted by Name  for Jaqueline Rojas as of 5/30/21 through 6/1/21   Legend:                          Inactive     Active     Other Encounter     Linked                 Medications 05/30/21 05/31/21 06/01/21   acetaminophen (TYLENOL) tablet 650 mg   Dose: 650 mg  Freq: Every 4 Hours PRN Route: PO  PRN Reason: Mild Pain   Start: 05/24/21 6918    Admin Instructions:    Do not exceed 4 grams of acetaminophen in a 24 hr period.    If given for pain, use the following pain scale:   Mild Pain = Pain Score of 1-3, CPOT 1-2  Moderate Pain = Pain Score of 4-6, CPOT 3-4  Severe Pain = Pain Score of 7-10, CPOT 5-8  Do not exceed 4 grams of acetaminophen in a 24 hr period. Max dose of 2gm for AST/ALT greater than 120 units/L      If given for pain, use the following pain scale:   Mild Pain = Pain Score of 1-3, CPOT 1-2  Moderate Pain = Pain Score of 4-6, CPOT 3-4  Severe Pain = Pain Score of 7-10, CPOT 5-8         Or  acetaminophen (TYLENOL) 160 MG/5ML solution 650 mg   Dose: 650 mg  Freq: Every 4 Hours PRN Route: PO  PRN Reason: Mild Pain   Start: 05/24/21 2339    Admin Instructions:   Do not exceed 4 grams of acetaminophen in a 24 hr period.    If given for pain, use the following pain scale:   Mild Pain = Pain Score of 1-3, CPOT 1-2  Moderate Pain = Pain Score of 4-6, CPOT 3-4  Severe Pain = Pain Score of 7-10, CPOT 5-8  Do not exceed 4 grams of acetaminophen in a 24 hr period. Max dose of 2gm for AST/ALT greater than 120 units/L      If given for pain, use the following pain scale:   Mild Pain = Pain Score of 1-3, CPOT 1-2  Moderate Pain = Pain Score of 4-6, CPOT 3-4  Severe Pain = Pain Score of 7-10, CPOT 5-8         Or  acetaminophen (TYLENOL) suppository 650 mg   Dose: 650 mg  Freq: Every 4 Hours PRN Route: RE  PRN Reason: Mild Pain   Start: 05/24/21 2387    Admin Instructions:   Do not exceed 4 grams of acetaminophen in a 24 hr period. Max dose of 2gm for AST/ALT greater than 120 units/L      If given for pain, use the following pain scale:   Mild Pain = Pain Score of 1-3, CPOT 1-2  Moderate Pain = Pain Score of 4-6, CPOT 3-4  Severe Pain = Pain Score of 7-10, CPOT 5-8         morphine injection 2 mg   Dose: 2 mg  Freq: Every 4 Hours PRN Route: IV  PRN Reason: Severe Pain   Start: 05/25/21 0404    Admin Instructions:   If given for pain, use the following pain scale:  Mild Pain =  Pain Score of 1-3, CPOT 1-2  Moderate Pain = Pain Score of 4-6, CPOT 3-4  Severe Pain = Pain Score of 7-10, CPOT 5-8         nitroglycerin (NITROSTAT) SL tablet 0.4 mg   Dose: 0.4 mg  Freq: Every 5 Minutes PRN Route: SL  PRN Reason: Chest Pain  PRN Comment: Only if SBP Greater Than 100  Start: 05/24/21 2339    Admin Instructions:   If Pain Unrelieved After 3 Doses Notify MD         ondansetron (ZOFRAN) injection 4 mg   Dose: 4 mg  Freq: Every 6 Hours PRN Route: IV  PRN Reasons: Nausea,Vomiting  Start: 05/24/21 2342    Admin Instructions:   If BOTH ondansetron (ZOFRAN) and promethazine (PHENERGAN) are ordered use ondansetron first and THEN promethazine IF ondansetron is ineffective.         Pharmacy to Dose Zosyn   Freq: Continuous PRN Route: XX  PRN Reason: Consult  Indications of Use: SEPSIS  Start: 05/24/21 2338 End: 05/25/21 0809         potassium chloride (K-DUR,KLOR-CON) ER tablet 40 mEq   Dose: 40 mEq  Freq: As Needed Route: PO  PRN Comment: Potassium Replacement.  See Admin Instructions  Start: 05/28/21 1333    Admin Instructions:   Potassium 3.1 or Less Give KCl 40 mEq q4h x3 Doses   Potassium 3.2 - 3.6 Give KCl 40 mEq q4h x2 Doses     Check Potassium 4 Hours After Last Dose Given   Check Magnesium if Potassium Level Remains Low After Replacement   DO NOT GIVE if CrCl is Less Than 30 mL/minute or Urine Output Less Than 30 mL/hr  Swallow whole; do not crush, split, or chew.     1542 2054           Or  potassium chloride (KLOR-CON) packet 40 mEq   Dose: 40 mEq  Freq: As Needed Route: PO  PRN Comment: potassium replacement, see admin instructions  Start: 05/28/21 1333    Admin Instructions:   Potassium 3.1 or Less Give KCl 40 mEq q4h x3 Doses   Potassium 3.2 - 3.6 Give KCl 40 mEq q4h x2 Doses     Check Potassium 4 Hours After Last Dose Given   Check Magnesium if Potassium Level Remains Low After Replacement   DO NOT GIVE if CrCl is Less Than 30 mL/minute or Urine Output Less Than 30 mL/hr                Or  potassium chloride 10 mEq in 100 mL IVPB   Dose: 10 mEq  Freq: Every 1 Hour PRN Route: IV  PRN Comment: Potassium Replacement - See Admin Instructions  Start: 05/28/21 1333    Admin Instructions:   Peripheral or Central IV  Potassium 3.1 or Less Give KCl 10 mEq/100 mL NS IV q1h x6 Doses  Potassium 3.2 - 3.6 Give KCl 10 mEq/100 mL NS q1h x4 Doses    Check Potassium 4 Hours After Last Dose Given  Check Magnesium if Potassium Remains Low After Replacement  DO NOT GIVE if CrCl is Less Than 30 mL/minute or Urine Output Less Than 30 mL/hr.     Rates Greater Than 10 mEq/hr Require ECG Monitoring.  OUTPATIENT/NON-MONITORED UNITS: Potassium Chloride standard bolus infusion rate is a maximum of 10 mEq/hr on unmonitored patients    MONITORED UNITS: Potassium Chloride standard bolus infusion rate is a maximum of 20 mEq/hr on ECG monitored patients ONLY               sodium chloride 0.9 % flush 10 mL   Dose: 10 mL  Freq: As Needed Route: IV  PRN Reason: Line Care  Start: 05/24/21 2339         sodium chloride 0.9 % flush 10 mL   Dose: 10 mL  Freq: As Needed Route: IV  PRN Reason: Line Care  Start: 05/24/21 1606         Medications 05/30/21 05/31/21 06/01/21

## 2021-06-01 NOTE — PROGRESS NOTES
Name: Jaqueline Rojas ADMIT: 2021   : 1924  PCP: Provider, No Known    MRN: [id number] LOS: 8 days   AGE/SEX: 89 y.o. female  ROOM: [facility]     Subjective   Subjective   No complaints this evening.     Objective   Objective   Vital Signs  Temp:  [97.7 °F (36.5 °C)-98.2 °F (36.8 °C)] 97.9 °F (36.6 °C)  Heart Rate:  [75-98] 96  Resp:  [16] 16  BP: (127-145)/(60-73) 127/60  SpO2:  [95 %] 95 %  on   ;   Device (Oxygen Therapy): room air  Body mass index is 20.76 kg/m².  Physical Exam  Constitutional:       General: She is not in acute distress.     Comments: Frail, elderly   Cardiovascular:      Rate and Rhythm: Normal rate and regular rhythm.      Heart sounds: Normal heart sounds.   Pulmonary:      Effort: Pulmonary effort is normal.      Breath sounds: Normal breath sounds.   Abdominal:      General: Bowel sounds are normal.      Palpations: Abdomen is soft.   Musculoskeletal:         General: No tenderness.      Right lower leg: Edema present.      Left lower leg: Edema present.      Comments: 1+ pitting edema BLE   Neurological:      Mental Status: She is alert.   Psychiatric:         Mood and Affect: Mood normal.         Behavior: Behavior normal.         Results Review     I reviewed the patient's new clinical results.  Results from last 7 days   Lab Units 21  04521  1008 21  0523   WBC 10*3/mm3 10.14 9.02 10.76 10.10   HEMOGLOBIN g/dL 9.3* 8.8* 9.5* 9.1*   PLATELETS 10*3/mm3 318 343 383 321     Results from last 7 days   Lab Units 21  0450 21  0449 21  1009 21  1607 21  0523   SODIUM mmol/L 133* 136 136  --  138   POTASSIUM mmol/L 4.1 3.3* 4.0 4.2 3.3*   CHLORIDE mmol/L 101 104 105  --  109*   CO2 mmol/L 27.0 22.9 20.5*  --  21.4*   BUN mg/dL 3* 3* 3*  --  4*   CREATININE mg/dL 0.60 0.57 0.54*  --  0.71   GLUCOSE mg/dL 86 109* 81  --  102*   Estimated Creatinine Clearance: 35.6 mL/min (by C-G formula based on SCr of 0.6  mg/dL).  Results from last 7 days   Lab Units 05/28/21  0521 05/27/21  0532   ALBUMIN g/dL 2.50* 2.20*   BILIRUBIN mg/dL 0.4 0.4   ALK PHOS U/L 62 59   AST (SGOT) U/L 16 13   ALT (SGPT) U/L 11 9     Results from last 7 days   Lab Units 06/01/21  0450 05/31/21  0449 05/30/21  1009 05/29/21  0523 05/28/21  0521 05/27/21  0532   CALCIUM mg/dL 8.0* 7.7* 7.9* 7.9* 8.2 7.8*   ALBUMIN g/dL  --   --   --   --  2.50* 2.20*   MAGNESIUM mg/dL  --   --  2.0  --   --   --        COVID19   Date Value Ref Range Status   05/28/2021 Not Detected Not Detected - Ref. Range Final   05/24/2021 Not Detected Not Detected - Ref. Range Final     No results found for: HGBA1C, POCGLU    CT Abdomen Pelvis With Contrast  Narrative: CT ABDOMEN PELVIS W CONTRAST-     CLINICAL HISTORY: Colitis with possible contained perforation. Patient  improving clinically and afebrile.     TECHNIQUE: Spiral CT images were acquired through the abdomen and pelvis  with IV and oral contrast and were reconstructed in 3 mm thick slices.     Radiation dose reduction techniques were utilized, including automated  exposure control and exposure modulation based on body size.     COMPARISON: CT scan of the abdomen and pelvis dated 05/24/2021.     FINDINGS: Images through the lung bases demonstrate small bilateral  posteriorly layering pleural effusions that are new since the preceding  CT scan. The liver, spleen, pancreas, and adrenal glands are  unremarkable. There is a single small simple cyst in the left kidney.  The kidneys are otherwise unremarkable.     The stomach and small bowel are well opacified with oral contrast. There  is slight dilatation of the small bowel. There is extensive lobulated  wall thickening involving the cecum that appears somewhat more prominent  and masslike than on the previous CT scan. The findings are most  consistent with a colon carcinoma. This produces marked luminal  narrowing within the cecum, and is likely resulting in the mild  small  bowel dilatation. The cecum/mass measures approximately 6.0 x 4.9 cm in  diameter. The remainder of the colon is contracted and contains very  little formed stool and air. There is no evidence of diffuse colitis. No  extraluminal air is identified in the abdomen or pelvis. There are  numerous diverticuli in the sigmoid colon. There is no CT evidence of  diverticulitis. No lymphadenopathy is identified. There is a small to  moderate amount of ascites adjacent to the liver and spleen and in the  pelvis that has developed since the preceding CT scan. Peritoneal  carcinomatosis cannot be excluded. However, no definite masses are  identified in the peritoneal cavity.     Impression: Probable lobulated tumor mass within the cecum consistent  with colon carcinoma producing marked luminal narrowing within the cecum  and likely low-grade obstruction, with multiple segments of mildly  distended small bowel in the abdomen and pelvis. There is no definite  evidence of metastatic disease within the abdomen or pelvis. However, a  small-to-moderate amount of ascites has developed since the preceding CT  scan dated 05/24/2021. Therefore, peritoneal carcinomatosis  cannot be  entirely excluded. Small bilateral pleural effusions have also  developed.     These findings were discussed with Dr. Marcelo Griffin on 05/31/2021 at 2:00  PM     This report was finalized on 5/31/2021 2:04 PM by Dr. Anil Arthur M.D.       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       89 y.o. female admitted with Cecum mass.    Cecal mass-Plan for right hemicolectomy tomorrow. Npo after midnight.    Colitis-resolved with abx  Paroxysmal supraventricular tachycardia-resolved. Cardiology switched coreg to metoprolol  Moderate to severe MR  Iron deficiency anemia     SCDs for DVT prophylaxis.  Limited code (no CPR, no intubation).  Discussed with patient and care team on multidisciplinary rounds.  Anticipate discharge home with home health timing yet to be determined.      Alton Bright MD  [facility]  06/01/21  18:59 EDT    I wore protective equipment throughout this patient encounter including a face mask, gloves and protective eyewear.  Hand hygiene was performed before donning protective equipment and after removal when leaving the room.

## 2021-06-01 NOTE — PLAN OF CARE
Problem: Adult Inpatient Plan of Care  Goal: Plan of Care Review  Outcome: Ongoing, Progressing  Flowsheets (Taken 6/1/2021 7870)  Progress: no change  Plan of Care Reviewed With: patient  Outcome Summary: Patient had no complaints overnight. Having frequent BM's, using BSC. A&Ox4. VSS. Plan for colon resection possibly Wednesday. Will continue to monitor.

## 2021-06-01 NOTE — CASE MANAGEMENT/SOCIAL WORK
Continued Stay Note  [facility]     Patient Name: Jaqueline Rojas  MRN: [id number]  Today's Date: 6/1/2021    Admit Date: 5/24/2021    Discharge Plan     Row Name 06/01/21 1635       Plan    Plan  Return home with family - plan may change post-op    Provided Post Acute Provider List?  Yes    Post Acute Provider List  Home Health;Nursing Home    Provided Post Acute Provider Quality & Resource List?  Yes    Post Acute Provider Quality and Resource List  Home Health    Delivered To  Patient    Method of Delivery  In person    Patient/Family in Agreement with Plan  yes    Plan Comments  Spoke with patient at bedside.  Plan remains for [place] to return home at [place].  She may need HH - plan for OR 6/2.  Given list of HH agencies and [facility]. CCP will follow.  BHumeniuk RN Becky S. Humeniuk, RN

## 2021-06-02 ENCOUNTER — ANESTHESIA (OUTPATIENT)
Dept: PERIOP | Facility: HOSPITAL | Age: 86
End: 2021-06-02

## 2021-06-02 ENCOUNTER — ANESTHESIA EVENT (OUTPATIENT)
Dept: PERIOP | Facility: HOSPITAL | Age: 86
End: 2021-06-02

## 2021-06-02 LAB
ABO GROUP BLD: NORMAL
ANION GAP SERPL CALCULATED.3IONS-SCNC: 5.9 MMOL/L (ref 5–15)
BLD GP AB SCN SERPL QL: NEGATIVE
BUN SERPL-MCNC: 4 MG/DL (ref 8–23)
BUN/CREAT SERPL: 8.3 (ref 7–25)
CALCIUM SPEC-SCNC: 8.3 MG/DL (ref 8.2–9.6)
CHLORIDE SERPL-SCNC: 100 MMOL/L (ref 98–107)
CO2 SERPL-SCNC: 27.1 MMOL/L (ref 22–29)
CREAT SERPL-MCNC: 0.48 MG/DL (ref 0.57–1)
DEPRECATED RDW RBC AUTO: 41.5 FL (ref 37–54)
ERYTHROCYTE [DISTWIDTH] IN BLOOD BY AUTOMATED COUNT: 13.6 % (ref 12.3–15.4)
GFR SERPL CREATININE-BSD FRML MDRD: 120 ML/MIN/1.73
GLUCOSE SERPL-MCNC: 102 MG/DL (ref 65–99)
HCT VFR BLD AUTO: 29.5 % (ref 34–46.6)
HGB BLD-MCNC: 9.8 G/DL (ref 12–15.9)
MCH RBC QN AUTO: 27.9 PG (ref 26.6–33)
MCHC RBC AUTO-ENTMCNC: 33.2 G/DL (ref 31.5–35.7)
MCV RBC AUTO: 84 FL (ref 79–97)
PLATELET # BLD AUTO: 360 10*3/MM3 (ref 140–450)
PMV BLD AUTO: 8.7 FL (ref 6–12)
POTASSIUM SERPL-SCNC: 3.7 MMOL/L (ref 3.5–5.2)
RBC # BLD AUTO: 3.51 10*6/MM3 (ref 3.77–5.28)
RH BLD: POSITIVE
SODIUM SERPL-SCNC: 133 MMOL/L (ref 136–145)
T&S EXPIRATION DATE: NORMAL
WBC # BLD AUTO: 8.95 10*3/MM3 (ref 3.4–10.8)

## 2021-06-02 PROCEDURE — 25010000002 ONDANSETRON PER 1 MG: Performed by: NURSE ANESTHETIST, CERTIFIED REGISTERED

## 2021-06-02 PROCEDURE — 25010000002 PROPOFOL 10 MG/ML EMULSION: Performed by: NURSE ANESTHETIST, CERTIFIED REGISTERED

## 2021-06-02 PROCEDURE — 25010000002 CEFOXITIN PER 1 G: Performed by: SURGERY

## 2021-06-02 PROCEDURE — 25010000002 DEXAMETHASONE PER 1 MG: Performed by: NURSE ANESTHETIST, CERTIFIED REGISTERED

## 2021-06-02 PROCEDURE — 86850 RBC ANTIBODY SCREEN: CPT | Performed by: ANESTHESIOLOGY

## 2021-06-02 PROCEDURE — 85027 COMPLETE CBC AUTOMATED: CPT | Performed by: STUDENT IN AN ORGANIZED HEALTH CARE EDUCATION/TRAINING PROGRAM

## 2021-06-02 PROCEDURE — 25010000002 MORPHINE PER 10 MG: Performed by: SURGERY

## 2021-06-02 PROCEDURE — 25010000002 FENTANYL CITRATE (PF) 50 MCG/ML SOLUTION: Performed by: NURSE ANESTHETIST, CERTIFIED REGISTERED

## 2021-06-02 PROCEDURE — 25010000002 NEOSTIGMINE 5 MG/10ML SOLUTION: Performed by: NURSE ANESTHETIST, CERTIFIED REGISTERED

## 2021-06-02 PROCEDURE — 80048 BASIC METABOLIC PNL TOTAL CA: CPT | Performed by: STUDENT IN AN ORGANIZED HEALTH CARE EDUCATION/TRAINING PROGRAM

## 2021-06-02 PROCEDURE — 25810000003 SODIUM CHLORIDE 0.9 % WITH KCL 20 MEQ 20-0.9 MEQ/L-% SOLUTION: Performed by: SURGERY

## 2021-06-02 PROCEDURE — 86901 BLOOD TYPING SEROLOGIC RH(D): CPT | Performed by: ANESTHESIOLOGY

## 2021-06-02 PROCEDURE — 44160 REMOVAL OF COLON: CPT | Performed by: SURGERY

## 2021-06-02 PROCEDURE — 86900 BLOOD TYPING SEROLOGIC ABO: CPT | Performed by: ANESTHESIOLOGY

## 2021-06-02 PROCEDURE — C1889 IMPLANT/INSERT DEVICE, NOC: HCPCS | Performed by: SURGERY

## 2021-06-02 PROCEDURE — 88309 TISSUE EXAM BY PATHOLOGIST: CPT | Performed by: SURGERY

## 2021-06-02 PROCEDURE — 0DTF0ZZ RESECTION OF RIGHT LARGE INTESTINE, OPEN APPROACH: ICD-10-PCS | Performed by: SURGERY

## 2021-06-02 PROCEDURE — 44160 REMOVAL OF COLON: CPT | Performed by: PHYSICIAN ASSISTANT

## 2021-06-02 DEVICE — PROXIMATE RELOADABLE LINEAR CUTTER WITH SAFETY LOCK-OUT, 75MM
Type: IMPLANTABLE DEVICE | Site: COLON | Status: FUNCTIONAL
Brand: PROXIMATE

## 2021-06-02 DEVICE — PROXIMATE LINEAR CUTTER RELOAD, BLUE, 75MM
Type: IMPLANTABLE DEVICE | Site: COLON | Status: FUNCTIONAL
Brand: PROXIMATE

## 2021-06-02 RX ORDER — IBUPROFEN 600 MG/1
600 TABLET ORAL ONCE AS NEEDED
Status: DISCONTINUED | OUTPATIENT
Start: 2021-06-02 | End: 2021-06-02 | Stop reason: HOSPADM

## 2021-06-02 RX ORDER — ONDANSETRON 2 MG/ML
4 INJECTION INTRAMUSCULAR; INTRAVENOUS ONCE AS NEEDED
Status: DISCONTINUED | OUTPATIENT
Start: 2021-06-02 | End: 2021-06-02 | Stop reason: HOSPADM

## 2021-06-02 RX ORDER — DIPHENHYDRAMINE HCL 25 MG
25 CAPSULE ORAL
Status: DISCONTINUED | OUTPATIENT
Start: 2021-06-02 | End: 2021-06-02 | Stop reason: HOSPADM

## 2021-06-02 RX ORDER — ALBUTEROL SULFATE 2.5 MG/3ML
2.5 SOLUTION RESPIRATORY (INHALATION) ONCE AS NEEDED
Status: DISCONTINUED | OUTPATIENT
Start: 2021-06-02 | End: 2021-06-02 | Stop reason: HOSPADM

## 2021-06-02 RX ORDER — SODIUM CHLORIDE 0.9 % (FLUSH) 0.9 %
3 SYRINGE (ML) INJECTION EVERY 12 HOURS SCHEDULED
Status: DISCONTINUED | OUTPATIENT
Start: 2021-06-02 | End: 2021-06-02 | Stop reason: HOSPADM

## 2021-06-02 RX ORDER — GLYCOPYRROLATE 0.2 MG/ML
INJECTION INTRAMUSCULAR; INTRAVENOUS AS NEEDED
Status: DISCONTINUED | OUTPATIENT
Start: 2021-06-02 | End: 2021-06-02 | Stop reason: SURG

## 2021-06-02 RX ORDER — FENTANYL CITRATE 50 UG/ML
50 INJECTION, SOLUTION INTRAMUSCULAR; INTRAVENOUS
Status: DISCONTINUED | OUTPATIENT
Start: 2021-06-02 | End: 2021-06-02 | Stop reason: HOSPADM

## 2021-06-02 RX ORDER — FENTANYL CITRATE 50 UG/ML
INJECTION, SOLUTION INTRAMUSCULAR; INTRAVENOUS AS NEEDED
Status: DISCONTINUED | OUTPATIENT
Start: 2021-06-02 | End: 2021-06-02 | Stop reason: SURG

## 2021-06-02 RX ORDER — KETAMINE HYDROCHLORIDE 10 MG/ML
INJECTION INTRAMUSCULAR; INTRAVENOUS AS NEEDED
Status: DISCONTINUED | OUTPATIENT
Start: 2021-06-02 | End: 2021-06-02 | Stop reason: SURG

## 2021-06-02 RX ORDER — DIPHENHYDRAMINE HYDROCHLORIDE 50 MG/ML
12.5 INJECTION INTRAMUSCULAR; INTRAVENOUS
Status: DISCONTINUED | OUTPATIENT
Start: 2021-06-02 | End: 2021-06-02 | Stop reason: HOSPADM

## 2021-06-02 RX ORDER — LIDOCAINE HYDROCHLORIDE 10 MG/ML
0.5 INJECTION, SOLUTION EPIDURAL; INFILTRATION; INTRACAUDAL; PERINEURAL ONCE AS NEEDED
Status: DISCONTINUED | OUTPATIENT
Start: 2021-06-02 | End: 2021-06-02 | Stop reason: HOSPADM

## 2021-06-02 RX ORDER — HYDROCODONE BITARTRATE AND ACETAMINOPHEN 5; 325 MG/1; MG/1
1 TABLET ORAL ONCE AS NEEDED
Status: DISCONTINUED | OUTPATIENT
Start: 2021-06-02 | End: 2021-06-02 | Stop reason: HOSPADM

## 2021-06-02 RX ORDER — BUPIVACAINE HYDROCHLORIDE AND EPINEPHRINE 5; 5 MG/ML; UG/ML
INJECTION, SOLUTION EPIDURAL; INTRACAUDAL; PERINEURAL AS NEEDED
Status: DISCONTINUED | OUTPATIENT
Start: 2021-06-02 | End: 2021-06-02 | Stop reason: HOSPADM

## 2021-06-02 RX ORDER — NALOXONE HCL 0.4 MG/ML
0.2 VIAL (ML) INJECTION AS NEEDED
Status: DISCONTINUED | OUTPATIENT
Start: 2021-06-02 | End: 2021-06-02 | Stop reason: HOSPADM

## 2021-06-02 RX ORDER — HYDROCODONE BITARTRATE AND ACETAMINOPHEN 7.5; 325 MG/1; MG/1
2 TABLET ORAL EVERY 4 HOURS PRN
Status: DISCONTINUED | OUTPATIENT
Start: 2021-06-02 | End: 2021-06-02 | Stop reason: HOSPADM

## 2021-06-02 RX ORDER — SODIUM CHLORIDE AND POTASSIUM CHLORIDE 150; 900 MG/100ML; MG/100ML
75 INJECTION, SOLUTION INTRAVENOUS CONTINUOUS
Status: DISCONTINUED | OUTPATIENT
Start: 2021-06-02 | End: 2021-06-05

## 2021-06-02 RX ORDER — DEXAMETHASONE SODIUM PHOSPHATE 10 MG/ML
INJECTION INTRAMUSCULAR; INTRAVENOUS AS NEEDED
Status: DISCONTINUED | OUTPATIENT
Start: 2021-06-02 | End: 2021-06-02 | Stop reason: SURG

## 2021-06-02 RX ORDER — MIDAZOLAM HYDROCHLORIDE 1 MG/ML
0.5 INJECTION INTRAMUSCULAR; INTRAVENOUS
Status: DISCONTINUED | OUTPATIENT
Start: 2021-06-02 | End: 2021-06-02 | Stop reason: HOSPADM

## 2021-06-02 RX ORDER — LIDOCAINE HYDROCHLORIDE 20 MG/ML
INJECTION, SOLUTION INFILTRATION; PERINEURAL AS NEEDED
Status: DISCONTINUED | OUTPATIENT
Start: 2021-06-02 | End: 2021-06-02 | Stop reason: SURG

## 2021-06-02 RX ORDER — ROCURONIUM BROMIDE 10 MG/ML
INJECTION, SOLUTION INTRAVENOUS AS NEEDED
Status: DISCONTINUED | OUTPATIENT
Start: 2021-06-02 | End: 2021-06-02 | Stop reason: SURG

## 2021-06-02 RX ORDER — SODIUM CHLORIDE, SODIUM LACTATE, POTASSIUM CHLORIDE, CALCIUM CHLORIDE 600; 310; 30; 20 MG/100ML; MG/100ML; MG/100ML; MG/100ML
9 INJECTION, SOLUTION INTRAVENOUS CONTINUOUS
Status: DISCONTINUED | OUTPATIENT
Start: 2021-06-02 | End: 2021-06-02

## 2021-06-02 RX ORDER — NEOSTIGMINE METHYLSULFATE 0.5 MG/ML
INJECTION, SOLUTION INTRAVENOUS AS NEEDED
Status: DISCONTINUED | OUTPATIENT
Start: 2021-06-02 | End: 2021-06-02 | Stop reason: SURG

## 2021-06-02 RX ORDER — FLUMAZENIL 0.1 MG/ML
0.2 INJECTION INTRAVENOUS AS NEEDED
Status: DISCONTINUED | OUTPATIENT
Start: 2021-06-02 | End: 2021-06-02 | Stop reason: HOSPADM

## 2021-06-02 RX ORDER — LABETALOL HYDROCHLORIDE 5 MG/ML
5 INJECTION, SOLUTION INTRAVENOUS
Status: DISCONTINUED | OUTPATIENT
Start: 2021-06-02 | End: 2021-06-02 | Stop reason: HOSPADM

## 2021-06-02 RX ORDER — MAGNESIUM HYDROXIDE 1200 MG/15ML
LIQUID ORAL AS NEEDED
Status: DISCONTINUED | OUTPATIENT
Start: 2021-06-02 | End: 2021-06-02 | Stop reason: HOSPADM

## 2021-06-02 RX ORDER — ONDANSETRON 2 MG/ML
INJECTION INTRAMUSCULAR; INTRAVENOUS AS NEEDED
Status: DISCONTINUED | OUTPATIENT
Start: 2021-06-02 | End: 2021-06-02 | Stop reason: SURG

## 2021-06-02 RX ORDER — MORPHINE SULFATE 2 MG/ML
4 INJECTION, SOLUTION INTRAMUSCULAR; INTRAVENOUS EVERY 4 HOURS PRN
Status: COMPLETED | OUTPATIENT
Start: 2021-06-02 | End: 2021-06-03

## 2021-06-02 RX ORDER — FAMOTIDINE 10 MG/ML
20 INJECTION, SOLUTION INTRAVENOUS ONCE
Status: COMPLETED | OUTPATIENT
Start: 2021-06-02 | End: 2021-06-02

## 2021-06-02 RX ORDER — SODIUM CHLORIDE, SODIUM LACTATE, POTASSIUM CHLORIDE, CALCIUM CHLORIDE 600; 310; 30; 20 MG/100ML; MG/100ML; MG/100ML; MG/100ML
100 INJECTION, SOLUTION INTRAVENOUS CONTINUOUS
Status: DISCONTINUED | OUTPATIENT
Start: 2021-06-02 | End: 2021-06-03

## 2021-06-02 RX ORDER — PROPOFOL 10 MG/ML
VIAL (ML) INTRAVENOUS AS NEEDED
Status: DISCONTINUED | OUTPATIENT
Start: 2021-06-02 | End: 2021-06-02 | Stop reason: SURG

## 2021-06-02 RX ORDER — SODIUM CHLORIDE 0.9 % (FLUSH) 0.9 %
3-10 SYRINGE (ML) INJECTION AS NEEDED
Status: DISCONTINUED | OUTPATIENT
Start: 2021-06-02 | End: 2021-06-02 | Stop reason: HOSPADM

## 2021-06-02 RX ORDER — EPHEDRINE SULFATE 50 MG/ML
5 INJECTION, SOLUTION INTRAVENOUS ONCE AS NEEDED
Status: DISCONTINUED | OUTPATIENT
Start: 2021-06-02 | End: 2021-06-02 | Stop reason: HOSPADM

## 2021-06-02 RX ORDER — PROMETHAZINE HYDROCHLORIDE 25 MG/1
25 SUPPOSITORY RECTAL ONCE AS NEEDED
Status: DISCONTINUED | OUTPATIENT
Start: 2021-06-02 | End: 2021-06-02 | Stop reason: HOSPADM

## 2021-06-02 RX ORDER — PROMETHAZINE HYDROCHLORIDE 25 MG/1
25 TABLET ORAL ONCE AS NEEDED
Status: DISCONTINUED | OUTPATIENT
Start: 2021-06-02 | End: 2021-06-02 | Stop reason: HOSPADM

## 2021-06-02 RX ADMIN — ONDANSETRON 4 MG: 2 INJECTION INTRAMUSCULAR; INTRAVENOUS at 14:55

## 2021-06-02 RX ADMIN — ROCURONIUM BROMIDE 30 MG: 50 INJECTION INTRAVENOUS at 13:18

## 2021-06-02 RX ADMIN — FENTANYL CITRATE 25 MCG: 50 INJECTION INTRAMUSCULAR; INTRAVENOUS at 13:18

## 2021-06-02 RX ADMIN — MORPHINE SULFATE 4 MG: 2 INJECTION, SOLUTION INTRAMUSCULAR; INTRAVENOUS at 16:59

## 2021-06-02 RX ADMIN — PROPOFOL 50 MG: 10 INJECTION, EMULSION INTRAVENOUS at 13:18

## 2021-06-02 RX ADMIN — SODIUM CHLORIDE, POTASSIUM CHLORIDE, SODIUM LACTATE AND CALCIUM CHLORIDE 9 ML/HR: 600; 310; 30; 20 INJECTION, SOLUTION INTRAVENOUS at 12:38

## 2021-06-02 RX ADMIN — GLYCOPYRROLATE 0.4 MG: 0.2 INJECTION INTRAMUSCULAR; INTRAVENOUS at 14:55

## 2021-06-02 RX ADMIN — FAMOTIDINE 20 MG: 10 INJECTION INTRAVENOUS at 12:38

## 2021-06-02 RX ADMIN — POTASSIUM CHLORIDE AND SODIUM CHLORIDE 75 ML/HR: 900; 150 INJECTION, SOLUTION INTRAVENOUS at 16:42

## 2021-06-02 RX ADMIN — FENTANYL CITRATE 50 MCG: 50 INJECTION INTRAMUSCULAR; INTRAVENOUS at 13:41

## 2021-06-02 RX ADMIN — DEXAMETHASONE SODIUM PHOSPHATE 8 MG: 10 INJECTION INTRAMUSCULAR; INTRAVENOUS at 13:44

## 2021-06-02 RX ADMIN — SODIUM CHLORIDE, PRESERVATIVE FREE 10 ML: 5 INJECTION INTRAVENOUS at 20:25

## 2021-06-02 RX ADMIN — LIDOCAINE HYDROCHLORIDE 40 MG: 20 INJECTION, SOLUTION INFILTRATION; PERINEURAL at 13:18

## 2021-06-02 RX ADMIN — PROPOFOL 25 MCG/KG/MIN: 10 INJECTION, EMULSION INTRAVENOUS at 13:25

## 2021-06-02 RX ADMIN — CEFOXITIN 2 G: 2 INJECTION, POWDER, FOR SOLUTION INTRAVENOUS at 12:58

## 2021-06-02 RX ADMIN — FENTANYL CITRATE 25 MCG: 50 INJECTION INTRAMUSCULAR; INTRAVENOUS at 13:46

## 2021-06-02 RX ADMIN — FENTANYL CITRATE 50 MCG: 50 INJECTION, SOLUTION INTRAMUSCULAR; INTRAVENOUS at 15:12

## 2021-06-02 RX ADMIN — METOPROLOL TARTRATE 12.5 MG: 25 TABLET, FILM COATED ORAL at 08:47

## 2021-06-02 RX ADMIN — SODIUM CHLORIDE, PRESERVATIVE FREE 10 ML: 5 INJECTION INTRAVENOUS at 08:48

## 2021-06-02 RX ADMIN — METOPROLOL TARTRATE 12.5 MG: 25 TABLET, FILM COATED ORAL at 20:25

## 2021-06-02 RX ADMIN — NEOSTIGMINE METHYLSULFATE 2 MG: 0.5 INJECTION INTRAVENOUS at 14:55

## 2021-06-02 RX ADMIN — KETAMINE HYDROCHLORIDE 10 MG: 10 INJECTION INTRAMUSCULAR; INTRAVENOUS at 13:23

## 2021-06-02 NOTE — PROGRESS NOTES
Name: Jaqueline Rojas ADMIT: 2021   : 1924  PCP: Provider, No Known    MRN: [id number] LOS: 9 days   AGE/SEX: 89 y.o. female  ROOM: [facility] OR/MAIN OR     Subjective   Subjective     Went for right hemicolectomy today with Dr. Griffin.  Patient appears to have tolerated the procedure well, and there were no complications.  She is seen in her room after returning from PACU.  She is pretty groggy still.       Objective   Objective   Vital Signs  Temp:  [97.6 °F (36.4 °C)-98.6 °F (37 °C)] 97.9 °F (36.6 °C)  Heart Rate:  [] 80  Resp:  [14-16] 14  BP: (134-164)/(65-92) 162/90  SpO2:  [91 %-99 %] 95 %  on  Flow (L/min):  [2-4] 2;   Device (Oxygen Therapy): nasal cannula  Body mass index is 20.76 kg/m².  Physical Exam  Constitutional:       General: She is not in acute distress.     Comments: Frail, elderly   Cardiovascular:      Rate and Rhythm: Normal rate and regular rhythm.      Heart sounds: Normal heart sounds.   Pulmonary:      Effort: Pulmonary effort is normal.      Breath sounds: Normal breath sounds.   Abdominal:      General: Bowel sounds are normal.      Palpations: Abdomen is soft.      Comments: Bandage over incision site, cdi   Musculoskeletal:         General: No tenderness.      Right lower leg: Edema present.      Left lower leg: Edema present.      Comments: 1+ pitting edema BLE   Neurological:      Mental Status: She is alert.   Psychiatric:         Mood and Affect: Mood normal.         Behavior: Behavior normal.         Results Review     I reviewed the patient's new clinical results.  Results from last 7 days   Lab Units 21  0613 21  04521  1008   WBC 10*3/mm3 8.95 10.14 9.02 10.76   HEMOGLOBIN g/dL 9.8* 9.3* 8.8* 9.5*   PLATELETS 10*3/mm3 360 318 343 383     Results from last 7 days   Lab Units 21  0613 21  0450 21  1009   SODIUM mmol/L 133* 133* 136 136   POTASSIUM mmol/L 3.7 4.1 3.3* 4.0   CHLORIDE mmol/L 100  101 104 105   CO2 mmol/L 27.1 27.0 22.9 20.5*   BUN mg/dL 4* 3* 3* 3*   CREATININE mg/dL 0.48* 0.60 0.57 0.54*   GLUCOSE mg/dL 102* 86 109* 81   Estimated Creatinine Clearance: 35.6 mL/min (A) (by C-G formula based on SCr of 0.48 mg/dL (L)).  Results from last 7 days   Lab Units 05/28/21  0521 05/27/21  0532   ALBUMIN g/dL 2.50* 2.20*   BILIRUBIN mg/dL 0.4 0.4   ALK PHOS U/L 62 59   AST (SGOT) U/L 16 13   ALT (SGPT) U/L 11 9     Results from last 7 days   Lab Units 06/02/21  0613 06/01/21  0450 05/31/21  0449 05/30/21  1009 05/28/21  0521 05/27/21  0532   CALCIUM mg/dL 8.3 8.0* 7.7* 7.9* 8.2 7.8*   ALBUMIN g/dL  --   --   --   --  2.50* 2.20*   MAGNESIUM mg/dL  --   --   --  2.0  --   --        COVID19   Date Value Ref Range Status   06/01/2021 Not Detected Not Detected - Ref. Range Final   05/28/2021 Not Detected Not Detected - Ref. Range Final   05/24/2021 Not Detected Not Detected - Ref. Range Final     No results found for: HGBA1C, POCGLU    CT Abdomen Pelvis With Contrast  Narrative: CT ABDOMEN PELVIS W CONTRAST-     CLINICAL HISTORY: Colitis with possible contained perforation. Patient  improving clinically and afebrile.     TECHNIQUE: Spiral CT images were acquired through the abdomen and pelvis  with IV and oral contrast and were reconstructed in 3 mm thick slices.     Radiation dose reduction techniques were utilized, including automated  exposure control and exposure modulation based on body size.     COMPARISON: CT scan of the abdomen and pelvis dated 05/24/2021.     FINDINGS: Images through the lung bases demonstrate small bilateral  posteriorly layering pleural effusions that are new since the preceding  CT scan. The liver, spleen, pancreas, and adrenal glands are  unremarkable. There is a single small simple cyst in the left kidney.  The kidneys are otherwise unremarkable.     The stomach and small bowel are well opacified with oral contrast. There  is slight dilatation of the small bowel. There is  extensive lobulated  wall thickening involving the cecum that appears somewhat more prominent  and masslike than on the previous CT scan. The findings are most  consistent with a colon carcinoma. This produces marked luminal  narrowing within the cecum, and is likely resulting in the mild small  bowel dilatation. The cecum/mass measures approximately 6.0 x 4.9 cm in  diameter. The remainder of the colon is contracted and contains very  little formed stool and air. There is no evidence of diffuse colitis. No  extraluminal air is identified in the abdomen or pelvis. There are  numerous diverticuli in the sigmoid colon. There is no CT evidence of  diverticulitis. No lymphadenopathy is identified. There is a small to  moderate amount of ascites adjacent to the liver and spleen and in the  pelvis that has developed since the preceding CT scan. Peritoneal  carcinomatosis cannot be excluded. However, no definite masses are  identified in the peritoneal cavity.     Impression: Probable lobulated tumor mass within the cecum consistent  with colon carcinoma producing marked luminal narrowing within the cecum  and likely low-grade obstruction, with multiple segments of mildly  distended small bowel in the abdomen and pelvis. There is no definite  evidence of metastatic disease within the abdomen or pelvis. However, a  small-to-moderate amount of ascites has developed since the preceding CT  scan dated 05/24/2021. Therefore, peritoneal carcinomatosis  cannot be  entirely excluded. Small bilateral pleural effusions have also  developed.     These findings were discussed with Dr. Marcelo Griffin on 05/31/2021 at 2:00  PM     This report was finalized on 5/31/2021 2:04 PM by Dr. Anil Arthur M.D.       Scheduled Medications  [MAR Hold] aspirin, 81 mg, Oral, Daily  [MAR Hold] digoxin, 0.5 mg, Intravenous, Once  metoprolol tartrate, 12.5 mg, Oral, Q12H  [MAR Hold] multivitamin, 1 tablet, Oral, Daily  [MAR Hold] sodium chloride, 10  mL, Intravenous, Q12H    Infusions  lactated ringers, 9 mL/hr, Last Rate: 9 mL/hr (06/02/21 1238)  lactated ringers, 100 mL/hr  sodium chloride 0.9 % with KCl 20 mEq, 50 mL/hr, Last Rate: 50 mL/hr (05/30/21 1223)    Diet  NPO Diet       Assessment/Plan     Active Hospital Problems    Diagnosis  POA   • **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]  Yes   • Hypokalemia [E87.6]  Yes   • 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       89 y.o. female admitted with Cecum mass.    Cecal mass-status post right hemicolectomy by Dr. Griffin on 6/2/2021.  Follow pathology.    Colitis-resolved with abx  Paroxysmal supraventricular tachycardia-resolved. Cardiology switched coreg to metoprolol  Moderate to severe MR  Iron deficiency anemia     SCDs for DVT prophylaxis.  Limited code (no CPR, no intubation).  Discussed with patient and care team on multidisciplinary rounds.  Anticipate discharge home with home health when cleared by consultants.      Alton Bright MD  [facility]  06/02/21  15:52 EDT    I wore protective equipment throughout this patient encounter including a face mask, gloves and protective eyewear.  Hand hygiene was performed before donning protective equipment and after removal when leaving the room.

## 2021-06-02 NOTE — ANESTHESIA POSTPROCEDURE EVALUATION
"Patient: Jaqueline Rojas    Procedure Summary     Date: 06/02/21 Room / Location[place] [facility]  / [facility]    Anesthesia Start: 1309 Anesthesia Stop: 1511    Procedure: COLON RESECTION RIGHT (N/A Abdomen) Diagnosis:       Cecum mass      (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])    Surgeons: Baldemar Griffin Jr., MD Provider: Lalit Aquino MD    Anesthesia Type: general ASA Status: 3 - Emergent          Anesthesia Type: general    Vitals  Vitals Value Taken Time   /90 06/02/21 1530   Temp 36.4 °C (97.6 °F) 06/02/21 1504   Pulse 81 06/02/21 1535   Resp 14 06/02/21 1530   SpO2 98 % 06/02/21 1535   Vitals shown include unvalidated device data.        Post Anesthesia Care and Evaluation    Patient location during evaluation: bedside  Patient participation: complete - patient participated  Level of consciousness: awake  Pain management: adequate  Airway patency: patent  Anesthetic complications: No anesthetic complications  PONV Status: controlled  Cardiovascular status: acceptable  Respiratory status: acceptable  Hydration status: acceptable    Comments: /90   Pulse 80   Temp 36.4 °C (97.6 °F) (Oral)   Resp 14   Ht 162.6 cm (64.02\")   Wt 54.9 kg (121 lb) Comment: not weighed by RD  SpO2 95%   BMI 20.76 kg/m²         "

## 2021-06-02 NOTE — ANESTHESIA PREPROCEDURE EVALUATION
Anesthesia Evaluation     Patient summary reviewed and Nursing notes reviewed   history of anesthetic complications: PONV               Airway   Mallampati: II  TM distance: >3 FB  Neck ROM: limited  Dental      Pulmonary - negative pulmonary ROS   Cardiovascular     ECG reviewed  Patient on routine beta blocker and Beta blocker given within 24 hours of surgery  Rhythm: irregular  Rate: normal    (+) valvular problems/murmurs MR, dysrhythmias Tachycardia, Paroxysmal Atrial Fib, PVC,       Neuro/Psych- negative ROS  GI/Hepatic/Renal/Endo - negative ROS     Musculoskeletal (-) negative ROS    Abdominal    Substance History - negative use     OB/GYN negative ob/gyn ROS         Other                        Anesthesia Plan    ASA 3 - emergent     general   Rapid sequence(I have reviewed the patient's history with the patient and the chart, including all pertinent laboratory results and imaging. I have explained the risks of anesthesia including but not limited to dental damage, corneal abrasion, nerve injury, MI, stroke, and death. Questions asked and answered. Anesthetic plan discussed with patient and team as indicated. Patient expressed understanding of the above.  )  intravenous induction     Anesthetic plan, all risks, benefits, and alternatives have been provided, discussed and informed consent has been obtained with: patient.

## 2021-06-02 NOTE — PLAN OF CARE
Goal Outcome Evaluation:  Plan of Care Reviewed With: patient  Progress: no change  Outcome Summary: Patient had no complaints through the night. Up ad beto to BSC. New IV placed in LFA. Consent for right hemicolectomy today signed and in chart. NPO since midnight. VSS. Will continue to monitor.

## 2021-06-02 NOTE — ANESTHESIA PROCEDURE NOTES
Airway  Urgency: elective    Date/Time: 6/2/2021 1:19 PM  Airway not difficult    General Information and Staff    Patient location during procedure: OR  Anesthesiologist: Lalit Aquino MD  CRNA: Shima Conde CRNA    Indications and Patient Condition  Indications for airway management: airway protection    Preoxygenated: yes  MILS not maintained throughout  Mask difficulty assessment: 1 - vent by mask    Final Airway Details  Final airway type: endotracheal airway      Successful airway: ETT  Cuffed: yes   Successful intubation technique: direct laryngoscopy  Facilitating devices/methods: intubating stylet  Endotracheal tube insertion site: oral  Blade: Armando  Blade size: 3  ETT size (mm): 7.0  Cormack-Lehane Classification: grade I - full view of glottis  Placement verified by: chest auscultation   Cuff volume (mL): 8  Measured from: lips  Number of attempts at approach: 1  Assessment: lips, teeth, and gum same as pre-op and atraumatic intubation    Additional Comments  PreO2 100% face mask, IV induction, easy mask, DVL x1, cords noted, tube through, cuff up, EBBSH, +etCO2, = chest movement, tube secured in place, atraumatic, teeth and lips intact as preop.

## 2021-06-02 NOTE — OP NOTE
Surgeon: Baldemar Griffin Jr., M.D.    Assistant: Damian Cantrell PA-C    An assistant was necessary and provided valuable retraction and exposure, as well as bowel manipulation, assistance with anastomosis, suctioning, suture following, and incision closure.    Pre-Operative Diagnosis:     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]    Post-Operative Diagnosis:    Large mass of cecum and proximal right colon    Procedure Performed:     Right hemicolectomy    Indications:     The patient is a pleasant 89-year-old female who presented to the emergency room with abdominal pain.  Ultimately a CT scan of the abdomen and pelvis showed a large cecal mass.  She presents for right hemicolectomy.  The patient understands the indications, alternatives, risks, and benefits of the procedure and wishes to proceed.    Procedure:     The patient was identified and taken to the operating room where she was placed in the supine position on the operating table.  She underwent general endotracheal anesthesia and was appropriate monitored throughout the case by the anesthesia personnel.  Her abdomen was prepped and draped in the standard surgical fashion.  A low midline incision was made and carried through the skin into the subcutaneous tissue.  The subcutaneous tissue was divided using Bovie electrocautery down to the fascia and the abdomen was entered.  Upon entering the abdomen there was some free fluid encountered that had the appearance of simple ascitic fluid.  It was straw-colored and thin with no odor.  The small bowel loops were normal.  The cecum was densely adherent to the bladder and occupied by a large mass.  The mass did not appear to be invading the bladder and was able to be carefully peeled away from the bladder.  The cecum and right colon were elevated and the ileocolic pedicle was identified and there were numerous nodes palpable along this pedicle suggesting steve disease.  There was no evidence of carcinomatosis and there was no evidence of  liver metastases.  The right colon was mobilized by dividing the peritoneum along the white line of Toldt.  Just around the hepatic flexure the colon was freed from the mesentery and divided with a ARNAV stapling device using a blue load.  The mesentery was then divided near its base using the Enseal device.  The ileocolic vessel was divided between Gissel clamps and 0 Vicryl ties.  Once the mesentery was divided to the ileocolic vessel the small bowel resection was selected.  This area was freed from the mesentery and divided with the ARNAV stapling device.  The mesentery was then divided using the Enseal device toward the previous mesenteric resection and this completed the division.  The specimen was passed off for pathology.  The staple line at the transverse colon was oversewn with 3-0 silk suture in a seromuscular fashion.  The same was intended to be performed at the small bowel but the distal 2 to 3 cm appeared somewhat ischemic.  Therefore a site was selected just proximal to the ischemia and divided with a ARNAV stapling device.  The mesentery was again divided with the Enseal device and this segment was included with the specimen.  The new resection line of the small bowel was very viable.  In fact, bleeding had to be controlled with Bovie electrocautery at the division line.  The staple line was then oversewn with 3-0 silk suture in a seromuscular fashion.  Following this a side-to-side functional end-to-end anastomosis was performed in an isoperistaltic manner with an inner layer of 3-0 Vicryl suture and an outer layer 3-0 silk suture.  This provided a widely patent, viable, intact anastomosis.  The mesenteric defect was closed with 3-0 silk suture.  The abdomen is copiously irrigated and the irrigant was noted to be clear.  Hemostasis was noted to be adequate.  The remaining GI structures were returned to the abdomen.  The fascia was reapproximated with #1 PDS sutures in a running fashion.  The skin was  closed with skin staples followed by sterile dressing.  The sponge, needle, and instrument counts were correct at the end of the case.  The patient tolerated procedure well and was transferred to the recovery room in stable condition.    Estimated Blood Loss:      20 mL    Specimens:     Order Name Source Comment Collection Info Order Time   TYPE AND SCREEN   Collected By: Elisa Kilgore RN 6/2/2021 12:29 PM     Release to patient   Immediate        TISSUE PATHOLOGY EXAM Large Intestine, Right / Ascending Colon  Collected By: Baldemar Griffin Jr., MD 6/2/2021  1:51 PM     Release to patient   Immediate            Baldemar Griffin Jr., M.D.

## 2021-06-03 PROBLEM — K55.1 SUPERIOR MESENTERIC ARTERY STENOSIS (HCC): Status: ACTIVE | Noted: 2021-05-26

## 2021-06-03 PROBLEM — K55.9 ISCHEMIC COLITIS (HCC): Status: ACTIVE | Noted: 2021-05-24

## 2021-06-03 LAB
ALBUMIN SERPL-MCNC: 2.8 G/DL (ref 3.5–5.2)
ALBUMIN/GLOB SERPL: 0.9 G/DL
ALP SERPL-CCNC: 48 U/L (ref 39–117)
ALT SERPL W P-5'-P-CCNC: 11 U/L (ref 1–33)
ANION GAP SERPL CALCULATED.3IONS-SCNC: 8.1 MMOL/L (ref 5–15)
AST SERPL-CCNC: 16 U/L (ref 1–32)
BASOPHILS # BLD AUTO: 0.03 10*3/MM3 (ref 0–0.2)
BASOPHILS NFR BLD AUTO: 0.2 % (ref 0–1.5)
BILIRUB SERPL-MCNC: 0.3 MG/DL (ref 0–1.2)
BUN SERPL-MCNC: 7 MG/DL (ref 8–23)
BUN/CREAT SERPL: 13 (ref 7–25)
CALCIUM SPEC-SCNC: 8.3 MG/DL (ref 8.2–9.6)
CHLORIDE SERPL-SCNC: 100 MMOL/L (ref 98–107)
CO2 SERPL-SCNC: 25.9 MMOL/L (ref 22–29)
CREAT SERPL-MCNC: 0.54 MG/DL (ref 0.57–1)
DEPRECATED RDW RBC AUTO: 41.8 FL (ref 37–54)
EOSINOPHIL # BLD AUTO: 0 10*3/MM3 (ref 0–0.4)
EOSINOPHIL NFR BLD AUTO: 0 % (ref 0.3–6.2)
ERYTHROCYTE [DISTWIDTH] IN BLOOD BY AUTOMATED COUNT: 13.4 % (ref 12.3–15.4)
GFR SERPL CREATININE-BSD FRML MDRD: 105 ML/MIN/1.73
GLOBULIN UR ELPH-MCNC: 3 GM/DL
GLUCOSE SERPL-MCNC: 118 MG/DL (ref 65–99)
HCT VFR BLD AUTO: 32.2 % (ref 34–46.6)
HGB BLD-MCNC: 10.8 G/DL (ref 12–15.9)
IMM GRANULOCYTES # BLD AUTO: 0.22 10*3/MM3 (ref 0–0.05)
IMM GRANULOCYTES NFR BLD AUTO: 1.2 % (ref 0–0.5)
LYMPHOCYTES # BLD AUTO: 0.99 10*3/MM3 (ref 0.7–3.1)
LYMPHOCYTES NFR BLD AUTO: 5.6 % (ref 19.6–45.3)
MCH RBC QN AUTO: 28.6 PG (ref 26.6–33)
MCHC RBC AUTO-ENTMCNC: 33.5 G/DL (ref 31.5–35.7)
MCV RBC AUTO: 85.2 FL (ref 79–97)
MONOCYTES # BLD AUTO: 0.81 10*3/MM3 (ref 0.1–0.9)
MONOCYTES NFR BLD AUTO: 4.6 % (ref 5–12)
NEUTROPHILS NFR BLD AUTO: 15.58 10*3/MM3 (ref 1.7–7)
NEUTROPHILS NFR BLD AUTO: 88.4 % (ref 42.7–76)
NRBC BLD AUTO-RTO: 0 /100 WBC (ref 0–0.2)
PLATELET # BLD AUTO: 409 10*3/MM3 (ref 140–450)
PMV BLD AUTO: 8.6 FL (ref 6–12)
POTASSIUM SERPL-SCNC: 4.9 MMOL/L (ref 3.5–5.2)
PROT SERPL-MCNC: 5.8 G/DL (ref 6–8.5)
RBC # BLD AUTO: 3.78 10*6/MM3 (ref 3.77–5.28)
SODIUM SERPL-SCNC: 134 MMOL/L (ref 136–145)
WBC # BLD AUTO: 17.63 10*3/MM3 (ref 3.4–10.8)

## 2021-06-03 PROCEDURE — 85025 COMPLETE CBC W/AUTO DIFF WBC: CPT | Performed by: STUDENT IN AN ORGANIZED HEALTH CARE EDUCATION/TRAINING PROGRAM

## 2021-06-03 PROCEDURE — 80053 COMPREHEN METABOLIC PANEL: CPT | Performed by: STUDENT IN AN ORGANIZED HEALTH CARE EDUCATION/TRAINING PROGRAM

## 2021-06-03 PROCEDURE — 99024 POSTOP FOLLOW-UP VISIT: CPT | Performed by: SURGERY

## 2021-06-03 PROCEDURE — 25010000002 MORPHINE PER 10 MG: Performed by: SURGERY

## 2021-06-03 PROCEDURE — 25810000003 SODIUM CHLORIDE 0.9 % WITH KCL 20 MEQ 20-0.9 MEQ/L-% SOLUTION: Performed by: SURGERY

## 2021-06-03 RX ORDER — HYDROCODONE BITARTRATE AND ACETAMINOPHEN 5; 325 MG/1; MG/1
1 TABLET ORAL EVERY 6 HOURS PRN
Status: DISCONTINUED | OUTPATIENT
Start: 2021-06-03 | End: 2021-06-14 | Stop reason: HOSPADM

## 2021-06-03 RX ORDER — MORPHINE SULFATE 2 MG/ML
4 INJECTION, SOLUTION INTRAMUSCULAR; INTRAVENOUS EVERY 4 HOURS PRN
Status: DISPENSED | OUTPATIENT
Start: 2021-06-03 | End: 2021-06-08

## 2021-06-03 RX ADMIN — POTASSIUM CHLORIDE AND SODIUM CHLORIDE 75 ML/HR: 900; 150 INJECTION, SOLUTION INTRAVENOUS at 18:10

## 2021-06-03 RX ADMIN — ASPIRIN 81 MG: 81 TABLET, COATED ORAL at 20:27

## 2021-06-03 RX ADMIN — METOPROLOL TARTRATE 12.5 MG: 25 TABLET, FILM COATED ORAL at 20:27

## 2021-06-03 RX ADMIN — MORPHINE SULFATE 4 MG: 2 INJECTION, SOLUTION INTRAMUSCULAR; INTRAVENOUS at 00:07

## 2021-06-03 RX ADMIN — METOPROLOL TARTRATE 12.5 MG: 25 TABLET, FILM COATED ORAL at 08:44

## 2021-06-03 RX ADMIN — MORPHINE SULFATE 4 MG: 2 INJECTION, SOLUTION INTRAMUSCULAR; INTRAVENOUS at 12:15

## 2021-06-03 RX ADMIN — POTASSIUM CHLORIDE AND SODIUM CHLORIDE 75 ML/HR: 900; 150 INJECTION, SOLUTION INTRAVENOUS at 04:29

## 2021-06-03 RX ADMIN — SODIUM CHLORIDE, PRESERVATIVE FREE 10 ML: 5 INJECTION INTRAVENOUS at 20:28

## 2021-06-03 RX ADMIN — SODIUM CHLORIDE, PRESERVATIVE FREE 10 ML: 5 INJECTION INTRAVENOUS at 08:44

## 2021-06-03 RX ADMIN — Medication 1 TABLET: at 08:44

## 2021-06-03 NOTE — PLAN OF CARE
Goal Outcome Evaluation:  Plan of Care Reviewed With: patient  Progress: no change  Outcome Summary: VSS, minimal complaints of pain.  PRN morphine given.  Diet advanced to clear liquid today.  Midline dressing remains clean, dry and intact.  Pt has an ice pack for abdomen.  Will continue to monitor.

## 2021-06-03 NOTE — CASE MANAGEMENT/SOCIAL WORK
Continued Stay Note  [facility]     Patient Name: Jaqueline Rojas  MRN: [id number]  Today's Date: 6/3/2021    Admit Date: 5/24/2021    Discharge Plan     Row Name 06/03/21 9462       Plan    Plan Comments  Message left for daughter, Sejal ( [phone]), to discuss dc planning.  Will follow up in AM. Lucero Mena RN        Discharge Codes    No documentation.             Lucero Mena RN

## 2021-06-03 NOTE — PLAN OF CARE
Goal Outcome Evaluation:  Plan of Care Reviewed With: patient, daughter  Progress: no change  Outcome Summary: Patient post-op right hemicolectomy, had some c/o pain through the night treated with PRN morphine. Remains NPO except ice and sips with meds. Trevino catheter to bedside drainage. Midline incision dressing remains clean, dry, and intact. Ice to abdomen applied as needed. On 1 L O2 at this time. Is using incentive spirometer as tolerated while awake. IVF continued. VSS. Will cotninue to monitor closely.

## 2021-06-03 NOTE — PROGRESS NOTES
Chief Complaint:    S/P Right hemicolectomy, POD 1    Subjective:    The patient is feeling well with only expected postop abdominal pain.  She is not having any nausea or vomiting.  She has not passed flatus or had a bowel movement.    Objective:    Temp:  [97.5 °F (36.4 °C)-98.2 °F (36.8 °C)] 97.5 °F (36.4 °C)  Heart Rate:  [] 75  Resp:  [14-18] 18  BP: (134-166)/(66-98) 139/66    Physical Exam  Constitutional:       Appearance: She is not ill-appearing or toxic-appearing.   Abdominal:      General: Bowel sounds are decreased. There is distension (Mildly distended).      Palpations: Abdomen is soft.      Tenderness: There is generalized abdominal tenderness (Appropriate postop tenderness).   Neurological:      Mental Status: She is alert.   Psychiatric:         Behavior: Behavior is cooperative.         Results:    WBC is 17.63.  H/H is 10.8/32.2.  BUN is 7 creatinine 0.54.    Impression/Plan:    The patient is POD 1 from a right hemicolectomy.  The patient is clinically stable with an expected postop ileus.  We will carefully start a clear liquid diet today.    Baldemar Griffin Jr., M.D.

## 2021-06-03 NOTE — PROGRESS NOTES
Name: Jaqueline Rojas ADMIT: 2021   : 1924  PCP: Provider, No Known    MRN: [id number] LOS: 10 days   AGE/SEX: 89 y.o. female  ROOM: [facility]     Subjective   Subjective   CC: abdominal discomfort  No acute events. Patient has no new complaints. She is having a little more abdominal pain today which is well-controlled. Tolerating clears without nausea or vomiting. No BM or flatus. No CP/dyspnea/f/c.    Objective   Objective   Vital Signs  Temp:  [97.5 °F (36.4 °C)-98.2 °F (36.8 °C)] 97.5 °F (36.4 °C)  Heart Rate:  [74-91] 79  Resp:  [16-18] 18  BP: (134-166)/(66-98) 154/80  SpO2:  [93 %-98 %] 93 %  on  Flow (L/min):  [0.5-2] 0.5;   Device (Oxygen Therapy): room air  Body mass index is 20.76 kg/m².  Physical Exam  Vitals and nursing note reviewed.   Constitutional:       General: She is not in acute distress.     Appearance: She is not toxic-appearing or diaphoretic.   HENT:      Head: Normocephalic and atraumatic.      Nose: Nose normal.      Mouth/Throat:      Mouth: Mucous membranes are moist.      Pharynx: Oropharynx is clear.   Eyes:      Extraocular Movements: Extraocular movements intact.      Conjunctiva/sclera: Conjunctivae normal.      Pupils: Pupils are equal, round, and reactive to light.   Cardiovascular:      Rate and Rhythm: Normal rate and regular rhythm.      Pulses: Normal pulses.   Pulmonary:      Effort: Pulmonary effort is normal.      Breath sounds: Examination of the right-lower field reveals decreased breath sounds. Examination of the left-lower field reveals decreased breath sounds. Decreased breath sounds present.   Abdominal:      General: Bowel sounds are decreased.      Palpations: Abdomen is soft.      Tenderness: There is abdominal tenderness (appropriate postop). There is no guarding or rebound.      Comments: Surgical dressing in place c/d/i   Musculoskeletal:         General: No swelling or tenderness.      Cervical back: Normal range of motion and neck supple.    Skin:     General: Skin is warm and dry.      Capillary Refill: Capillary refill takes less than 2 seconds.   Neurological:      General: No focal deficit present.      Mental Status: She is alert and oriented to person, place, and time.   Psychiatric:         Mood and Affect: Mood normal.         Behavior: Behavior normal.       Results Review     I reviewed the patient's new clinical results.  I reviewed the patient's telemetry.  Results from last 7 days   Lab Units 06/03/21 0555 06/02/21 0613 06/01/21 0450 05/31/21 0449   WBC 10*3/mm3 17.63* 8.95 10.14 9.02   HEMOGLOBIN g/dL 10.8* 9.8* 9.3* 8.8*   PLATELETS 10*3/mm3 409 360 318 343     Results from last 7 days   Lab Units 06/03/21 0555 06/02/21 0613 06/01/21 0450 05/31/21 0449   SODIUM mmol/L 134* 133* 133* 136   POTASSIUM mmol/L 4.9 3.7 4.1 3.3*   CHLORIDE mmol/L 100 100 101 104   CO2 mmol/L 25.9 27.1 27.0 22.9   BUN mg/dL 7* 4* 3* 3*   CREATININE mg/dL 0.54* 0.48* 0.60 0.57   GLUCOSE mg/dL 118* 102* 86 109*   Estimated Creatinine Clearance: 35.6 mL/min (A) (by C-G formula based on SCr of 0.54 mg/dL (L)).  Results from last 7 days   Lab Units 06/03/21 0555 05/28/21  0521   ALBUMIN g/dL 2.80* 2.50*   BILIRUBIN mg/dL 0.3 0.4   ALK PHOS U/L 48 62   AST (SGOT) U/L 16 16   ALT (SGPT) U/L 11 11     Results from last 7 days   Lab Units 06/03/21  0555 06/02/21 0613 06/01/21 0450 05/31/21 0449 05/30/21  1009 05/28/21  0521   CALCIUM mg/dL 8.3 8.3 8.0* 7.7* 7.9* 8.2   ALBUMIN g/dL 2.80*  --   --   --   --  2.50*   MAGNESIUM mg/dL  --   --   --   --  2.0  --        COVID19   Date Value Ref Range Status   06/01/2021 Not Detected Not Detected - Ref. Range Final   05/28/2021 Not Detected Not Detected - Ref. Range Final     No results found for: HGBA1C, POCGLU    CT Abdomen Pelvis With Contrast  Narrative: CT ABDOMEN PELVIS W CONTRAST-     CLINICAL HISTORY: Colitis with possible contained perforation. Patient  improving clinically and afebrile.     TECHNIQUE:  Spiral CT images were acquired through the abdomen and pelvis  with IV and oral contrast and were reconstructed in 3 mm thick slices.     Radiation dose reduction techniques were utilized, including automated  exposure control and exposure modulation based on body size.     COMPARISON: CT scan of the abdomen and pelvis dated 05/24/2021.     FINDINGS: Images through the lung bases demonstrate small bilateral  posteriorly layering pleural effusions that are new since the preceding  CT scan. The liver, spleen, pancreas, and adrenal glands are  unremarkable. There is a single small simple cyst in the left kidney.  The kidneys are otherwise unremarkable.     The stomach and small bowel are well opacified with oral contrast. There  is slight dilatation of the small bowel. There is extensive lobulated  wall thickening involving the cecum that appears somewhat more prominent  and masslike than on the previous CT scan. The findings are most  consistent with a colon carcinoma. This produces marked luminal  narrowing within the cecum, and is likely resulting in the mild small  bowel dilatation. The cecum/mass measures approximately 6.0 x 4.9 cm in  diameter. The remainder of the colon is contracted and contains very  little formed stool and air. There is no evidence of diffuse colitis. No  extraluminal air is identified in the abdomen or pelvis. There are  numerous diverticuli in the sigmoid colon. There is no CT evidence of  diverticulitis. No lymphadenopathy is identified. There is a small to  moderate amount of ascites adjacent to the liver and spleen and in the  pelvis that has developed since the preceding CT scan. Peritoneal  carcinomatosis cannot be excluded. However, no definite masses are  identified in the peritoneal cavity.     Impression: Probable lobulated tumor mass within the cecum consistent  with colon carcinoma producing marked luminal narrowing within the cecum  and likely low-grade obstruction, with  multiple segments of mildly  distended small bowel in the abdomen and pelvis. There is no definite  evidence of metastatic disease within the abdomen or pelvis. However, a  small-to-moderate amount of ascites has developed since the preceding CT  scan dated 05/24/2021. Therefore, peritoneal carcinomatosis  cannot be  entirely excluded. Small bilateral pleural effusions have also  developed.     These findings were discussed with Dr. Marcelo Griffin on 05/31/2021 at 2:00  PM     This report was finalized on 5/31/2021 2:04 PM by Dr. Anil Arthur M.D.       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   Cecal Mass with Microscopic Perforation  - s/p right hemicolectomy 6/2/21  - continue pain control, incentive spirometry  - encourage mobility  - finished course of zosyn  - await return of bowel function-on clears  - continue IVF  - appreciate general surgery recs    Colitis  - I suspect ischemic, provoked but above decreased supply/demand ratio from above issue-has 70% SMA stenosis   - this has resolved with above treatment    PSVT/Moderate-Severe MR  - resolved  - on  metoprolol  - cardiopulmonary status stable  - appreciate cardiology recs, they have signed off    Iron Deficiency Anemia  - Hgb stable, no evidence of ongoing blood loss    SCDs for DVT prophylaxis.  DNR.  Discussed with patient, nursing staff and care team on multidisciplinary rounds.  Anticipate discharge home with HH vs SNU facility timing yet to be determined.      Wil Montoya MD  [facility]  06/03/21  18:04 EDT

## 2021-06-03 NOTE — PROGRESS NOTES
Adult Nutrition  Assessment/PES    Patient Name:  Jaqueline Rojas  YOB: 1924  MRN: [id number]  Admit Date:  5/24/2021    Assessment Date:  6/3/2021    Comments:  Follow up note. Pt post-op right hemicolectomy for Cecal mass. Pt NPO at this time. Will continue to follow for advance of diet as tolerated.     Reason for Assessment     Row Name 06/03/21 0934          Reason for Assessment    Reason For Assessment  follow-up protocol         Nutrition/Diet History     Row Name 06/03/21 0935          Nutrition/Diet History    Typical Food/Fluid Intake  NPO post surgery           Labs/Tests/Procedures/Meds     Row Name 06/03/21 0935          Labs/Procedures/Meds    Lab Results Reviewed  reviewed        Diagnostic Tests/Procedures    Diagnostic Test/Procedure Reviewed  reviewed     Diagnostic Test/Procedures Comments  post-op right hemicolectomy        Medications    Pertinent Medications Reviewed  reviewed         Physical Findings     Row Name 06/03/21 0935          Physical Findings    Skin  surgical incision           Nutrition Prescription Ordered     Row Name 06/03/21 0935          Nutrition Prescription PO    Current PO Diet  NPO                 Problem/Interventions:          Intervention Goal     Row Name 06/03/21 0935          Intervention Goal    General  Maintain nutrition;Disease management/therapy;Reduce/improve symptoms     PO  Initiate feeding;Tolerate PO     Weight  Maintain weight         Nutrition Intervention     Row Name 06/03/21 0936          Nutrition Intervention    RD/Tech Action  Follow Tx progress;Care plan reviewd           Education/Evaluation     Row Name 06/03/21 0936          Education    Education  Will Instruct as appropriate        Monitor/Evaluation    Monitor  Per protocol           Electronically signed by:  Yasmeen Wiley RD  06/03/21 09:36 EDT

## 2021-06-04 PROBLEM — E87.6 HYPOKALEMIA: Status: RESOLVED | Noted: 2021-05-29 | Resolved: 2021-06-04

## 2021-06-04 PROBLEM — E87.1 HYPONATREMIA: Status: ACTIVE | Noted: 2021-06-04

## 2021-06-04 LAB
ANION GAP SERPL CALCULATED.3IONS-SCNC: 7.4 MMOL/L (ref 5–15)
BASOPHILS # BLD AUTO: 0.04 10*3/MM3 (ref 0–0.2)
BASOPHILS NFR BLD AUTO: 0.3 % (ref 0–1.5)
BUN SERPL-MCNC: 10 MG/DL (ref 8–23)
BUN/CREAT SERPL: 20 (ref 7–25)
CALCIUM SPEC-SCNC: 8.2 MG/DL (ref 8.2–9.6)
CHLORIDE SERPL-SCNC: 100 MMOL/L (ref 98–107)
CO2 SERPL-SCNC: 24.6 MMOL/L (ref 22–29)
CREAT SERPL-MCNC: 0.5 MG/DL (ref 0.57–1)
DEPRECATED RDW RBC AUTO: 40.5 FL (ref 37–54)
EOSINOPHIL # BLD AUTO: 0.03 10*3/MM3 (ref 0–0.4)
EOSINOPHIL NFR BLD AUTO: 0.2 % (ref 0.3–6.2)
ERYTHROCYTE [DISTWIDTH] IN BLOOD BY AUTOMATED COUNT: 13.4 % (ref 12.3–15.4)
GFR SERPL CREATININE-BSD FRML MDRD: 114 ML/MIN/1.73
GLUCOSE SERPL-MCNC: 180 MG/DL (ref 65–99)
HCT VFR BLD AUTO: 34.9 % (ref 34–46.6)
HGB BLD-MCNC: 11.4 G/DL (ref 12–15.9)
IMM GRANULOCYTES # BLD AUTO: 0.13 10*3/MM3 (ref 0–0.05)
IMM GRANULOCYTES NFR BLD AUTO: 0.9 % (ref 0–0.5)
LAB AP CASE REPORT: NORMAL
LAB AP DIAGNOSIS COMMENT: NORMAL
LAB AP SYNOPTIC CHECKLIST: NORMAL
LYMPHOCYTES # BLD AUTO: 0.79 10*3/MM3 (ref 0.7–3.1)
LYMPHOCYTES NFR BLD AUTO: 5.5 % (ref 19.6–45.3)
MCH RBC QN AUTO: 27.2 PG (ref 26.6–33)
MCHC RBC AUTO-ENTMCNC: 32.7 G/DL (ref 31.5–35.7)
MCV RBC AUTO: 83.3 FL (ref 79–97)
MONOCYTES # BLD AUTO: 0.97 10*3/MM3 (ref 0.1–0.9)
MONOCYTES NFR BLD AUTO: 6.8 % (ref 5–12)
NEUTROPHILS NFR BLD AUTO: 12.3 10*3/MM3 (ref 1.7–7)
NEUTROPHILS NFR BLD AUTO: 86.3 % (ref 42.7–76)
NRBC BLD AUTO-RTO: 0 /100 WBC (ref 0–0.2)
PATH REPORT.FINAL DX SPEC: NORMAL
PATH REPORT.GROSS SPEC: NORMAL
PLATELET # BLD AUTO: 485 10*3/MM3 (ref 140–450)
PMV BLD AUTO: 8.4 FL (ref 6–12)
POTASSIUM SERPL-SCNC: 4.8 MMOL/L (ref 3.5–5.2)
RBC # BLD AUTO: 4.19 10*6/MM3 (ref 3.77–5.28)
SODIUM SERPL-SCNC: 132 MMOL/L (ref 136–145)
WBC # BLD AUTO: 14.26 10*3/MM3 (ref 3.4–10.8)

## 2021-06-04 PROCEDURE — 80048 BASIC METABOLIC PNL TOTAL CA: CPT | Performed by: INTERNAL MEDICINE

## 2021-06-04 PROCEDURE — 85025 COMPLETE CBC W/AUTO DIFF WBC: CPT | Performed by: INTERNAL MEDICINE

## 2021-06-04 PROCEDURE — 97162 PT EVAL MOD COMPLEX 30 MIN: CPT

## 2021-06-04 PROCEDURE — 25010000002 MORPHINE PER 10 MG: Performed by: NURSE PRACTITIONER

## 2021-06-04 PROCEDURE — 25810000003 SODIUM CHLORIDE 0.9 % WITH KCL 20 MEQ 20-0.9 MEQ/L-% SOLUTION: Performed by: SURGERY

## 2021-06-04 PROCEDURE — 97110 THERAPEUTIC EXERCISES: CPT

## 2021-06-04 PROCEDURE — 99024 POSTOP FOLLOW-UP VISIT: CPT | Performed by: SURGERY

## 2021-06-04 RX ORDER — FAMOTIDINE 10 MG/ML
20 INJECTION, SOLUTION INTRAVENOUS DAILY
Status: DISCONTINUED | OUTPATIENT
Start: 2021-06-04 | End: 2021-06-12

## 2021-06-04 RX ADMIN — SODIUM CHLORIDE, PRESERVATIVE FREE 10 ML: 5 INJECTION INTRAVENOUS at 20:41

## 2021-06-04 RX ADMIN — Medication 1 TABLET: at 08:16

## 2021-06-04 RX ADMIN — METOPROLOL TARTRATE 12.5 MG: 25 TABLET, FILM COATED ORAL at 08:16

## 2021-06-04 RX ADMIN — ACETAMINOPHEN 650 MG: 325 TABLET, FILM COATED ORAL at 18:30

## 2021-06-04 RX ADMIN — POTASSIUM CHLORIDE AND SODIUM CHLORIDE 75 ML/HR: 900; 150 INJECTION, SOLUTION INTRAVENOUS at 19:40

## 2021-06-04 RX ADMIN — FAMOTIDINE 20 MG: 10 INJECTION INTRAVENOUS at 22:02

## 2021-06-04 RX ADMIN — METOPROLOL TARTRATE 12.5 MG: 25 TABLET, FILM COATED ORAL at 20:41

## 2021-06-04 RX ADMIN — MORPHINE SULFATE 4 MG: 2 INJECTION, SOLUTION INTRAMUSCULAR; INTRAVENOUS at 02:50

## 2021-06-04 RX ADMIN — SODIUM CHLORIDE, PRESERVATIVE FREE 10 ML: 5 INJECTION INTRAVENOUS at 09:25

## 2021-06-04 RX ADMIN — POTASSIUM CHLORIDE AND SODIUM CHLORIDE 75 ML/HR: 900; 150 INJECTION, SOLUTION INTRAVENOUS at 06:15

## 2021-06-04 RX ADMIN — ASPIRIN 81 MG: 81 TABLET, COATED ORAL at 20:41

## 2021-06-04 NOTE — PLAN OF CARE
Goal Outcome Evaluation:  Plan of Care Reviewed With: patient  Progress: improving  Outcome Summary: Patient has had c/o pain this evening, order received for PRN Norco and PRN morphine. Up to BSC with assist, but did not have a BM. Surgical dressing clean, dry, and intact. Trevino care performed, likely will be discontinued today. IVF continued. Tolerating clear liquids so far. VSS. Will continue to monitor.

## 2021-06-04 NOTE — PROGRESS NOTES
Chief Complaint:    S/P Right hemicolectomy, POD 2    Subjective:    The patient is generally feeling well.  She had mild nausea but no vomiting.  She has been tolerating clear liquid diet.  She believes she has passed a little flatus but no bowel movement.    Objective:    Temp:  [97.6 °F (36.4 °C)-97.8 °F (36.6 °C)] 97.8 °F (36.6 °C)  Heart Rate:  [] 108  Resp:  [16-18] 16  BP: (117-161)/(64-80) 117/72    Physical Exam  Constitutional:       Appearance: She is not ill-appearing or toxic-appearing.   Abdominal:      General: Bowel sounds are decreased. There is distension (Moderately distended).      Palpations: Abdomen is soft.      Tenderness: There is generalized abdominal tenderness (Appropriate postop tenderness).      Comments: Incision: Intact with no evidence of infection.   Neurological:      Mental Status: She is alert.   Psychiatric:         Behavior: Behavior is cooperative.         Results:    There are no new labs today.    Impression/Plan:    The patient is POD 2 from a right hemicolectomy for a cecal malignancy.  She continues to have an expected postop ileus that is slowly resolving.  She will be kept on a clear liquid diet for the time being.    She was encouraged to increase her activity.    Baldemar Griffin Jr., M.D.

## 2021-06-04 NOTE — PLAN OF CARE
Goal Outcome Evaluation:  Plan of Care Reviewed With: patient, daughter     Outcome Summary: Pt is a 89 yo F who presented with abdominal pain and nausea. Pt is now POD 2 R hemicolectomy d/t cecal malignancy. Pt lives with her daughter and has 2 entrances into the home - 2 steps with no HRs and 5 steps with 2 HRs. Pt presents to PT with impaired strength, endurance, and balance. Pt completed bed mobility with SBA-supervision as well as STS with SBA. Ambulated 100ft with FWW and CGA secondary to impaired balance - pt reported use of cane at BL but was too unsteady to attempt today. Pt with overall good mechanics with a walker and may benefit from getting one for home. Pt will continue to benefit from skilled PT to address these deficits. PT recommending D/C home with HHPT at this time.

## 2021-06-04 NOTE — PROGRESS NOTES
Name: Jaqueline Rojas ADMIT: 2021   : 1924  PCP: Provider, No Known    MRN: [id number] LOS: 11 days   AGE/SEX: 89 y.o. female  ROOM: [facility]     Subjective   Subjective   CC: abdominal discomfort  No acute events. Complaining of reflux symptoms. Pain is well-controlled. Tolerating clears without nausea or vomiting-she is actually anxious to advance diet. No BM or flatus. Ambulating. No CP/dyspnea/f/c.    Objective   Objective   Vital Signs  Temp:  [97.6 °F (36.4 °C)-98.1 °F (36.7 °C)] 98.1 °F (36.7 °C)  Heart Rate:  [] 96  Resp:  [16] 16  BP: (117-161)/(64-80) 131/80  SpO2:  [95 %-98 %] 95 %  on  Flow (L/min):  [0.5-1] 0.5;   Device (Oxygen Therapy): room air  Body mass index is 20.76 kg/m².  Physical Exam  Vitals and nursing note reviewed.   Constitutional:       General: She is not in acute distress.     Appearance: She is not toxic-appearing or diaphoretic.   HENT:      Head: Normocephalic and atraumatic.      Nose: Nose normal.      Mouth/Throat:      Mouth: Mucous membranes are moist.      Pharynx: Oropharynx is clear.   Eyes:      Extraocular Movements: Extraocular movements intact.      Conjunctiva/sclera: Conjunctivae normal.      Pupils: Pupils are equal, round, and reactive to light.   Cardiovascular:      Rate and Rhythm: Normal rate and regular rhythm.      Pulses: Normal pulses.   Pulmonary:      Effort: Pulmonary effort is normal.      Breath sounds: Examination of the right-lower field reveals decreased breath sounds. Examination of the left-lower field reveals decreased breath sounds. Decreased breath sounds present.   Abdominal:      General: Bowel sounds are decreased.      Palpations: Abdomen is soft.      Tenderness: There is abdominal tenderness (appropriate postop). There is no guarding or rebound.      Comments: Surgical dressing in place c/d/i   Musculoskeletal:         General: No swelling or tenderness.      Cervical back: Normal range of motion and neck supple.    Skin:     General: Skin is warm and dry.      Capillary Refill: Capillary refill takes less than 2 seconds.   Neurological:      General: No focal deficit present.      Mental Status: She is alert and oriented to person, place, and time.   Psychiatric:         Mood and Affect: Mood normal.         Behavior: Behavior normal.       Results Review     I reviewed the patient's new clinical results.  I reviewed the patient's telemetry.  Results from last 7 days   Lab Units 06/04/21  1144 06/03/21  0555 06/02/21  0613 06/01/21  0450   WBC 10*3/mm3 14.26* 17.63* 8.95 10.14   HEMOGLOBIN g/dL 11.4* 10.8* 9.8* 9.3*   PLATELETS 10*3/mm3 485* 409 360 318     Results from last 7 days   Lab Units 06/04/21  1144 06/03/21  0555 06/02/21  0613 06/01/21  0450   SODIUM mmol/L 132* 134* 133* 133*   POTASSIUM mmol/L 4.8 4.9 3.7 4.1   CHLORIDE mmol/L 100 100 100 101   CO2 mmol/L 24.6 25.9 27.1 27.0   BUN mg/dL 10 7* 4* 3*   CREATININE mg/dL 0.50* 0.54* 0.48* 0.60   GLUCOSE mg/dL 180* 118* 102* 86   Estimated Creatinine Clearance: 35.6 mL/min (A) (by C-G formula based on SCr of 0.5 mg/dL (L)).  Results from last 7 days   Lab Units 06/03/21  0555   ALBUMIN g/dL 2.80*   BILIRUBIN mg/dL 0.3   ALK PHOS U/L 48   AST (SGOT) U/L 16   ALT (SGPT) U/L 11     Results from last 7 days   Lab Units 06/04/21  1144 06/03/21  0555 06/02/21  0613 06/01/21  0450 05/30/21  1009   CALCIUM mg/dL 8.2 8.3 8.3 8.0* 7.9*   ALBUMIN g/dL  --  2.80*  --   --   --    MAGNESIUM mg/dL  --   --   --   --  2.0       COVID19   Date Value Ref Range Status   06/01/2021 Not Detected Not Detected - Ref. Range Final   05/28/2021 Not Detected Not Detected - Ref. Range Final     No results found for: HGBA1C, POCGLU    CT Abdomen Pelvis With Contrast  Narrative: CT ABDOMEN PELVIS W CONTRAST-     CLINICAL HISTORY: Colitis with possible contained perforation. Patient  improving clinically and afebrile.     TECHNIQUE: Spiral CT images were acquired through the abdomen and  pelvis  with IV and oral contrast and were reconstructed in 3 mm thick slices.     Radiation dose reduction techniques were utilized, including automated  exposure control and exposure modulation based on body size.     COMPARISON: CT scan of the abdomen and pelvis dated 05/24/2021.     FINDINGS: Images through the lung bases demonstrate small bilateral  posteriorly layering pleural effusions that are new since the preceding  CT scan. The liver, spleen, pancreas, and adrenal glands are  unremarkable. There is a single small simple cyst in the left kidney.  The kidneys are otherwise unremarkable.     The stomach and small bowel are well opacified with oral contrast. There  is slight dilatation of the small bowel. There is extensive lobulated  wall thickening involving the cecum that appears somewhat more prominent  and masslike than on the previous CT scan. The findings are most  consistent with a colon carcinoma. This produces marked luminal  narrowing within the cecum, and is likely resulting in the mild small  bowel dilatation. The cecum/mass measures approximately 6.0 x 4.9 cm in  diameter. The remainder of the colon is contracted and contains very  little formed stool and air. There is no evidence of diffuse colitis. No  extraluminal air is identified in the abdomen or pelvis. There are  numerous diverticuli in the sigmoid colon. There is no CT evidence of  diverticulitis. No lymphadenopathy is identified. There is a small to  moderate amount of ascites adjacent to the liver and spleen and in the  pelvis that has developed since the preceding CT scan. Peritoneal  carcinomatosis cannot be excluded. However, no definite masses are  identified in the peritoneal cavity.     Impression: Probable lobulated tumor mass within the cecum consistent  with colon carcinoma producing marked luminal narrowing within the cecum  and likely low-grade obstruction, with multiple segments of mildly  distended small bowel in the  abdomen and pelvis. There is no definite  evidence of metastatic disease within the abdomen or pelvis. However, a  small-to-moderate amount of ascites has developed since the preceding CT  scan dated 05/24/2021. Therefore, peritoneal carcinomatosis  cannot be  entirely excluded. Small bilateral pleural effusions have also  developed.     These findings were discussed with Dr. Marcelo Griffin on 05/31/2021 at 2:00  PM     This report was finalized on 5/31/2021 2:04 PM by Dr. Anil Arthur M.D.       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]  Yes   • Mitral valve regurgitation [I34.0]  Yes   • Leukocytosis [D72.829]  Yes   • Hyperglycemia [R73.9]  Yes   • Anemia [D64.9]  Yes   • Ischemic colitis (CMS/HCC) [K55.9]  Yes   • Iron deficiency anemia due to chronic blood loss [D50.0]  Yes   • PSVT (paroxysmal supraventricular tachycardia) (CMS/HCC) [I47.1]  Yes      Resolved Hospital Problems    Diagnosis Date Resolved POA   • Hypokalemia [E87.6] 06/04/2021 Yes   Cecal Mass with Microscopic Perforation  - s/p right hemicolectomy 6/2/21  - continue pain control, incentive spirometry  - encourage mobility  - finished course of zosyn  - await return of bowel function-on clears  - continue IVF  - start on IV famotidine with reflux sx  - appreciate general surgery recs    Colitis  - I suspect ischemic, provoked but above decreased supply/demand ratio from above issue-has 70% SMA stenosis   - this has resolved with above treatment    PSVT/Moderate-Severe MR  -  resolved  - on metoprolol  - cardiopulmonary status stable  - appreciate cardiology recs, they have signed off    Iron Deficiency Anemia  - Hgb stable, no evidence of ongoing blood loss    Hyponatremia  - relatively stable  - will observe for now    SCDs for DVT prophylaxis.  DNR.  Discussed with patient, nursing staff and care team on multidisciplinary rounds.  Anticipate discharge home with HH vs SNU facility timing yet to be determined.      Wil Montoya MD  [facility]  06/04/21  19:04 EDT

## 2021-06-04 NOTE — CASE MANAGEMENT/SOCIAL WORK
Continued Stay Note  [facility]     Patient Name: Jaqueline Rojas  MRN: [id number]  Today's Date: 6/4/2021    Admit Date: 5/24/2021    Discharge Plan     Row Name 06/04/21 1403       Plan    Plan  home with family- follow progress with PT    Plan Comments  Spoke with daughter, Sejal, and she has concerns about getting her mother into the home at [place].  There are 5 steps with a railing in the front and 3 steps with no railing on the breezeway.  I encouraged her to call or go into [place] and talk with their accessibility department as they may have some type of portable ramp. Lucero Mena RN        Discharge Codes    No documentation.             Lucero Mena RN

## 2021-06-04 NOTE — THERAPY EVALUATION
Patient Name: Jaqueline Rojas  : 1924    MRN: [id number]                              Today's Date: 2021       Admit Date: 2021    Visit Dx:     ICD-10-CM ICD-9-CM   1. Colitis  K52.9 558.9   2. Abdominal pain, unspecified abdominal location  R10.9 789.00   3. Iron deficiency anemia due to chronic blood loss  D50.0 280.0   4. Cecum mass  K63.89 569.89     Patient Active Problem List   Diagnosis   • Irregular heart rate   • Shortness of breath on exertion   • Supraventricular tachycardia (CMS/HCC)   • PVC (premature ventricular contraction)   • PSVT (paroxysmal supraventricular tachycardia) (CMS/HCC)   • Palpitations   • Ischemic colitis (CMS/HCC)   • Mitral valve regurgitation   • Leukocytosis   • Hyperglycemia   • Anemia   • Superior mesenteric artery stenosis (CMS/HCC)   • Iron deficiency anemia due to chronic blood loss   • Hypokalemia   • Cecum mass     Past Medical History:   Diagnosis Date   • Afib (CMS/HCC)    • CANCINO (dyspnea on exertion)    • Frequent PVCs     multifocal   • Hypotension    • Irregular heart beat    • Knee injury    • Mitral regurgitation     Moderate to Severe per Echocardiogram 2016   • Palpitations    • PONV (postoperative nausea and vomiting)    • Supraventricular tachycardia (CMS/HCC)    • Wrist fracture      Past Surgical History:   Procedure Laterality Date   • COLON RESECTION N/A 2021    Procedure: COLON RESECTION RIGHT;  Surgeon: Baldemar Griffin Jr., MD;  Location: [facility];  Service: General;  Laterality: N/A;   • MASTOID SURGERY     • SKIN GRAFT     • TONSILLECTOMY     • WRIST SURGERY       General Information     Row Name 21 1450          Physical Therapy Time and Intention    Document Type  evaluation  -     Mode of Treatment  individual therapy;physical therapy  -     Row Name 21 1450          General Information    Patient Profile Reviewed  yes  -     Prior Level of Function  independent:;gait;transfer;bed mobility  -     Existing  Precautions/Restrictions  fall  -     Barriers to Rehab  medically complex  -     Row Name 06/04/21 1450          Living Environment    Lives With  child(paula), adult  -     Row Name 06/04/21 1450          Home Main Entrance    Number of Stairs, Main Entrance  two;five 2 different entrances: 2 steps with no HRs and 5 steps with B HRs  -     Stair Railings, Main Entrance  none;railings on both sides of stairs  -     Row Name 06/04/21 1450          Stairs Within Home, Primary    Number of Stairs, Within Home, Primary  none  -     Row Name 06/04/21 1450          Cognition    Orientation Status (Cognition)  oriented x 3  -[facility] Name 06/04/21 1450          Safety Issues, Functional Mobility    Impairments Affecting Function (Mobility)  balance;endurance/activity tolerance;strength  -       User Key  (r) = Recorded By, (t) = Taken By, (c) = Cosigned By    Initials Name Provider Type     Marti Dickens Physical Therapist        Mobility     Row Name 06/04/21 1451          Bed Mobility    Bed Mobility  supine-sit;sit-supine  -     Supine-Sit [place] (Bed Mobility)  standby assist;1 person assist  -     Sit-Supine [place] (Bed Mobility)  supervision;1 person assist  -     Assistive Device (Bed Mobility)  bed rails;head of bed elevated  -[facility] Name 06/04/21 1451          Sit-Stand Transfer    Sit-Stand [place] (Transfers)  standby assist;1 person assist  -     Assistive Device (Sit-Stand Transfers)  walker, front-wheeled  -[facility] Name 06/04/21 1451          Gait/Stairs (Locomotion)    [place] Level (Gait)  contact guard;1 person assist;nonverbal cues (demo/gesture);verbal cues  -     Assistive Device (Gait)  walker, front-wheeled  -     Distance in Feet (Gait)  100ft  -     Deviations/Abnormal Patterns (Gait)  gait speed decreased;stride length decreased  [facility]     Bilateral Gait Deviations  forward flexed posture;heel strike decreased  -     [place] Level  (Stairs)  not tested  -     Comment (Gait/Stairs)  Ambulated 100ft with CGA and FWW. Reports use of a cane at home, but too unsteady with gait today so used a rwx. Demonstrated overall safe mechanics, but slow gait speed and decreased stride length.  -       User Key  (r) = Recorded By, (t) = Taken By, (c) = Cosigned By    Initials Name Provider Type     Marti Dickens Physical Therapist        Obj/Interventions     [facility] Name 06/04/21 1455          Range of Motion Comprehensive    General Range of Motion  no range of motion deficits identified  -BH     Row Name 06/04/21 1459          Strength Comprehensive (MMT)    General Manual Muscle Testing (MMT) Assessment  lower extremity strength deficits identified  -     Comment, General Manual Muscle Testing (MMT) Assessment  Generalized weakness, BLE grossly 4/5 with MMT  -[facility] Name 06/04/21 1454          Balance    Balance Assessment  sitting static balance;standing static balance  -     Static Sitting Balance  WFL;unsupported;sitting, edge of bed  -     Static Standing Balance  WFL;unsupported;standing  -       User Key  (r) = Recorded By, (t) = Taken By, (c) = Cosigned By    Initials Name Provider Type     Marti Dickens Physical Therapist        Goals/Plan     [facility] Name 06/04/21 1501          Bed Mobility Goal 1 (PT)    Activity/Assistive Device (Bed Mobility Goal 1, PT)  bed mobility activities, all  -     [place] Level/Cues Needed (Bed Mobility Goal 1, PT)  independent  -     Time Frame (Bed Mobility Goal 1, PT)  1 week  -[facility] Name 06/04/21 1501          Transfer Goal 1 (PT)    Activity/Assistive Device (Transfer Goal 1, PT)  transfers, all  -     [place] Level/Cues Needed (Transfer Goal 1, PT)  supervision required  -     Time Frame (Transfer Goal 1, PT)  1 week  -[facility] Name 06/04/21 1501          Gait Training Goal 1 (PT)    Activity/Assistive Device (Gait Training Goal 1, PT)  gait (walking locomotion)  -      [place] Level (Gait Training Goal 1, PT)  supervision required  -     Distance (Gait Training Goal 1, PT)  200ft  -     Time Frame (Gait Training Goal 1, PT)  1 week  -     Row Name 06/04/21 1501          Stairs Goal 1 (PT)    Activity/Assistive Device (Stairs Goal 1, PT)  stairs, all skills  -     [place] Level/Cues Needed (Stairs Goal 1, PT)  contact guard assist  -     Number of Stairs (Stairs Goal 1, PT)  5  -BH     Time Frame (Stairs Goal 1, PT)  1 week  -       User Key  (r) = Recorded By, (t) = Taken By, (c) = Cosigned By    Initials Name Provider Type     Marti Dickens Physical Therapist        Clinical Impression     Row Name 06/04/21 5222          Pain    Additional Documentation  Pain Scale: FACES Pre/Post-Treatment (Group)  -     Row Name 06/04/21 2092          Pain Scale: Numbers Pre/Post-Treatment    Pain Intervention(s)  Repositioned;Ambulation/increased activity  -     Row Name 06/04/21 3652          Pain Scale: FACES Pre/Post-Treatment    Pain: FACES Scale, Pretreatment  2-->hurts little bit  -     Posttreatment Pain Rating  2-->hurts little bit  -     Pain Location - Orientation  incisional  -     Pain Location  abdomen  -     Row Name 06/04/21 3104          Plan of Care Review    Plan of Care Reviewed With  patient;daughter  -     Outcome Summary  Pt is a 89 yo F who presented with abdominal pain and nausea. Pt is now POD 2 R hemicolectomy d/t cecal malignancy. Pt lives with her daughter and has 2 entrances into the home - 2 steps with no HRs and 5 steps with 2 HRs. Pt presents to PT with impaired strength, endurance, and balance. Pt completed bed mobility with SBA-supervision as well as STS with SBA. Ambulated 100ft with FWW and CGA secondary to impaired balance - pt reported use of cane at BL but was too unsteady to attempt today. Pt with overall good mechanics with a walker and may benefit from getting one for home. Pt will continue to benefit from skilled  PT to address these deficits. PT recommending D/C home with HHPT at this time.  -     Row Name 06/04/21 1456          Therapy Assessment/Plan (PT)    Patient/Family Therapy Goals Statement (PT)  Return to PLOF  -     Rehab Potential (PT)  good, to achieve stated therapy goals  -     Criteria for Skilled Interventions Met (PT)  yes  -     Row Name 06/04/21 1456          Positioning and Restraints    Pre-Treatment Position  in bed  -     Post Treatment Position  bed  -     In Bed  supine;call light within reach;encouraged to call for assist;exit alarm on;with family/caregiver  -       User Key  (r) = Recorded By, (t) = Taken By, (c) = Cosigned By    Initials Name Provider Type    Marti Soler Physical Therapist        Outcome Measures     Row Name 06/04/21 1502          How much help from another person do you currently need...    Turning from your back to your side while in flat bed without using bedrails?  3  -BH     Moving from lying on back to sitting on the side of a flat bed without bedrails?  3  -BH     Moving to and from a bed to a chair (including a wheelchair)?  3  -BH     Standing up from a chair using your arms (e.g., wheelchair, bedside chair)?  3  -BH     Climbing 3-5 steps with a railing?  2  -BH     To walk in hospital room?  3  -     AM-PAC 6 Clicks Score (PT)  17  -     Row Name 06/04/21 1502          Functional Assessment    Outcome Measure Options  AM-PAC 6 Clicks Basic Mobility (PT)  -       User Key  (r) = Recorded By, (t) = Taken By, (c) = Cosigned By    Initials Name Provider Type    Marti Soler Physical Therapist        Physical Therapy Education                 Title: PT OT SLP Therapies (Done)     Topic: Physical Therapy (Done)     Point: Mobility training (Done)     Learning Progress Summary           Patient Acceptance, E,TB,D, VU,NR by  at 6/4/2021 1502   Family Acceptance, E,TB,D, VU,NR by  at 6/4/2021 1502                   Point: Home exercise program  (Done)     Learning Progress Summary           Patient Acceptance, E,TB,D, VU,NR by  at 6/4/2021 1502   Family Acceptance, E,TB,D, VU,NR by  at 6/4/2021 1502                   Point: Body mechanics (Done)     Learning Progress Summary           Patient Acceptance, E,TB,D, VU,NR by  at 6/4/2021 1502   Family Acceptance, E,TB,D, VU,NR by  at 6/4/2021 1502                   Point: Precautions (Done)     Learning Progress Summary           Patient Acceptance, E,TB,D, VU,NR by  at 6/4/2021 1502   Family Acceptance, E,TB,D, VU,NR by  at 6/4/2021 1502                               User Key     Initials Effective Dates Name Provider Type Discipline     05/10/21 -  Marti Dickens Physical Therapist PT              PT Recommendation and Plan  Planned Therapy Interventions (PT): balance training, bed mobility training, gait training, home exercise program, patient/family education, stair training, strengthening, transfer training  Plan of Care Reviewed With: patient, daughter  Outcome Summary: Pt is a 89 yo F who presented with abdominal pain and nausea. Pt is now POD 2 R hemicolectomy d/t cecal malignancy. Pt lives with her daughter and has 2 entrances into the home - 2 steps with no HRs and 5 steps with 2 HRs. Pt presents to PT with impaired strength, endurance, and balance. Pt completed bed mobility with SBA-supervision as well as STS with SBA. Ambulated 100ft with FWW and CGA secondary to impaired balance - pt reported use of cane at BL but was too unsteady to attempt today. Pt with overall good mechanics with a walker and may benefit from getting one for home. Pt will continue to benefit from skilled PT to address these deficits. PT recommending D/C home with HHPT at this time.     Time Calculation:   PT Charges     Row Name 06/04/21 1503             Time Calculation    Start Time  1429  -      Stop Time  1444  -      Time Calculation (min)  15 min  -      PT Received On  06/04/21  -      PT - Next  Appointment  06/07/21  -      PT Goal Re-Cert Due Date  06/11/21  -         Time Calculation- PT    Total Timed Code Minutes- PT  10 minute(s)  -         Timed Charges    73665 - PT Therapeutic Exercise Minutes  10  -BH         Untimed Charges    PT Eval/Re-eval Minutes  5  -BH         Total Minutes    Timed Charges Total Minutes  10  -BH      Untimed Charges Total Minutes  5  -BH       Total Minutes  15  -BH        User Key  (r) = Recorded By, (t) = Taken By, (c) = Cosigned By    Initials Name Provider Type     Marti Dickens Physical Therapist        Therapy Charges for Today     Code Description Service Date Service Provider Modifiers Qty    20520515197 HC PT EVAL MOD COMPLEXITY 2 6/4/2021 Marti Dickens GP 1    38891201764 HC PT THER PROC EA 15 MIN 6/4/2021 Marti Dickens GP 1          PT G-Codes  Outcome Measure Options: AM-PAC 6 Clicks Basic Mobility (PT)  AM-PAC 6 Clicks Score (PT): 17    MARTI DICKENS  6/4/2021

## 2021-06-05 LAB
ANION GAP SERPL CALCULATED.3IONS-SCNC: 7.5 MMOL/L (ref 5–15)
BASOPHILS # BLD AUTO: 0.05 10*3/MM3 (ref 0–0.2)
BASOPHILS NFR BLD AUTO: 0.4 % (ref 0–1.5)
BUN SERPL-MCNC: 8 MG/DL (ref 8–23)
BUN/CREAT SERPL: 19 (ref 7–25)
CALCIUM SPEC-SCNC: 8.1 MG/DL (ref 8.2–9.6)
CHLORIDE SERPL-SCNC: 100 MMOL/L (ref 98–107)
CO2 SERPL-SCNC: 24.5 MMOL/L (ref 22–29)
CREAT SERPL-MCNC: 0.42 MG/DL (ref 0.57–1)
DEPRECATED RDW RBC AUTO: 39.1 FL (ref 37–54)
EOSINOPHIL # BLD AUTO: 0.18 10*3/MM3 (ref 0–0.4)
EOSINOPHIL NFR BLD AUTO: 1.5 % (ref 0.3–6.2)
ERYTHROCYTE [DISTWIDTH] IN BLOOD BY AUTOMATED COUNT: 13.2 % (ref 12.3–15.4)
GFR SERPL CREATININE-BSD FRML MDRD: 140 ML/MIN/1.73
GLUCOSE SERPL-MCNC: 111 MG/DL (ref 65–99)
HCT VFR BLD AUTO: 30.7 % (ref 34–46.6)
HGB BLD-MCNC: 10.6 G/DL (ref 12–15.9)
IMM GRANULOCYTES # BLD AUTO: 0.13 10*3/MM3 (ref 0–0.05)
IMM GRANULOCYTES NFR BLD AUTO: 1.1 % (ref 0–0.5)
LYMPHOCYTES # BLD AUTO: 1.21 10*3/MM3 (ref 0.7–3.1)
LYMPHOCYTES NFR BLD AUTO: 10.2 % (ref 19.6–45.3)
MCH RBC QN AUTO: 28.2 PG (ref 26.6–33)
MCHC RBC AUTO-ENTMCNC: 34.5 G/DL (ref 31.5–35.7)
MCV RBC AUTO: 81.6 FL (ref 79–97)
MONOCYTES # BLD AUTO: 1.1 10*3/MM3 (ref 0.1–0.9)
MONOCYTES NFR BLD AUTO: 9.3 % (ref 5–12)
NEUTROPHILS NFR BLD AUTO: 77.5 % (ref 42.7–76)
NEUTROPHILS NFR BLD AUTO: 9.22 10*3/MM3 (ref 1.7–7)
NRBC BLD AUTO-RTO: 0 /100 WBC (ref 0–0.2)
PLATELET # BLD AUTO: 427 10*3/MM3 (ref 140–450)
PMV BLD AUTO: 8.8 FL (ref 6–12)
POTASSIUM SERPL-SCNC: 4.5 MMOL/L (ref 3.5–5.2)
RBC # BLD AUTO: 3.76 10*6/MM3 (ref 3.77–5.28)
SODIUM SERPL-SCNC: 132 MMOL/L (ref 136–145)
WBC # BLD AUTO: 11.89 10*3/MM3 (ref 3.4–10.8)

## 2021-06-05 PROCEDURE — 25810000003 SODIUM CHLORIDE 0.9 % WITH KCL 20 MEQ 20-0.9 MEQ/L-% SOLUTION: Performed by: SURGERY

## 2021-06-05 PROCEDURE — 80048 BASIC METABOLIC PNL TOTAL CA: CPT | Performed by: INTERNAL MEDICINE

## 2021-06-05 PROCEDURE — 99024 POSTOP FOLLOW-UP VISIT: CPT | Performed by: SURGERY

## 2021-06-05 PROCEDURE — 85025 COMPLETE CBC W/AUTO DIFF WBC: CPT | Performed by: INTERNAL MEDICINE

## 2021-06-05 RX ORDER — DEXTROSE, SODIUM CHLORIDE, AND POTASSIUM CHLORIDE 5; .45; .15 G/100ML; G/100ML; G/100ML
100 INJECTION INTRAVENOUS CONTINUOUS
Status: DISCONTINUED | OUTPATIENT
Start: 2021-06-05 | End: 2021-06-06

## 2021-06-05 RX ADMIN — POTASSIUM CHLORIDE AND SODIUM CHLORIDE 75 ML/HR: 900; 150 INJECTION, SOLUTION INTRAVENOUS at 09:09

## 2021-06-05 RX ADMIN — SODIUM CHLORIDE, PRESERVATIVE FREE 10 ML: 5 INJECTION INTRAVENOUS at 08:58

## 2021-06-05 RX ADMIN — POTASSIUM CHLORIDE, DEXTROSE MONOHYDRATE AND SODIUM CHLORIDE 100 ML/HR: 150; 5; 450 INJECTION, SOLUTION INTRAVENOUS at 14:34

## 2021-06-05 RX ADMIN — FAMOTIDINE 20 MG: 10 INJECTION INTRAVENOUS at 08:58

## 2021-06-05 RX ADMIN — SODIUM CHLORIDE, PRESERVATIVE FREE 10 ML: 5 INJECTION INTRAVENOUS at 20:30

## 2021-06-05 RX ADMIN — ACETAMINOPHEN 650 MG: 325 TABLET, FILM COATED ORAL at 14:34

## 2021-06-05 RX ADMIN — ASPIRIN 81 MG: 81 TABLET, COATED ORAL at 20:29

## 2021-06-05 RX ADMIN — METOPROLOL TARTRATE 12.5 MG: 25 TABLET, FILM COATED ORAL at 08:57

## 2021-06-05 RX ADMIN — Medication 1 TABLET: at 08:58

## 2021-06-05 RX ADMIN — METOPROLOL TARTRATE 12.5 MG: 25 TABLET, FILM COATED ORAL at 20:29

## 2021-06-05 NOTE — PLAN OF CARE
Goal Outcome Evaluation:         VSS. Up to chair x 2; walk x 2; encouraged activity.Family at bedside today. Passing gas; no stool; NPO per surgery; IVF changed. Tylenol x 1 for abdominal pain.

## 2021-06-05 NOTE — PLAN OF CARE
Problem: Adult Inpatient Plan of Care  Goal: Plan of Care Review  Outcome: Ongoing, Progressing  Flowsheets (Taken 6/5/2021 3320)  Progress: improving  Plan of Care Reviewed With: patient  Outcome Summary: Patient had no complaints overnight. Now POD #3 from R hemicolectomy. Incision C/D/I, no drainage and open to air. Patient is passing gas and belching, but no BM yet. Assist x1 to BSC. IVFs continue. A&Ox4. VSS. Will continue to monitor.

## 2021-06-05 NOTE — PROGRESS NOTES
Postoperative day 3 right colectomy    Continues to have mild nausea but no emesis.  No bowel function.  Has taken minimal if any clears.    Afebrile vital signs stable.  Labs in good order.  Abdomen is moderate to markedly distended with absent bowel sounds.  Incision healing well.    Probable postoperative ileus.  Will make n.p.o. except ice chips, continue IV fluids, and encourage ambulation.

## 2021-06-05 NOTE — PROGRESS NOTES
[phone]   LOS: 12 days     Name: Jaqueline Rojas  Age/Sex: 89 y.o. female  :  1924        PCP: Provider, No Known  Chief Complaint   Patient presents with   • Abdominal Pain      Subjective   She is complaining of abdominal pain and cramping today.  It is different than the surgical pain.  She feels like it is gas pain more than anything else.  She is most frustrated by her diet presently.  She does not like the clear liquids and really wants to advance to full liquid so she can have tomato soup.  She denies any nausea or vomiting has still not passed any stool.  General: No Fever or Chills, Cardiac: No Chest Pain or Palpitations, Resp: No Cough or SOA, GI: No Nausea, Vomiting, or Diarrhea and Other: No bleeding    aspirin, 81 mg, Oral, Daily  digoxin, 0.5 mg, Intravenous, Once  famotidine, 20 mg, Intravenous, Daily  metoprolol tartrate, 12.5 mg, Oral, Q12H  multivitamin, 1 tablet, Oral, Daily  sodium chloride, 10 mL, Intravenous, Q12H      sodium chloride 0.9 % with KCl 20 mEq, 75 mL/hr, Last Rate: 75 mL/hr (21 0909)        Objective   Vital Signs  Temp:  [97.9 °F (36.6 °C)-98.1 °F (36.7 °C)] 98 °F (36.7 °C)  Heart Rate:  [] 97  Resp:  [16-18] 16  BP: (123-151)/(72-85) 146/85  Body mass index is 20.76 kg/m².  No intake or output data in the 24 hours ending 21 0950    Physical Exam  Vitals and nursing note reviewed.   Constitutional:       Appearance: Normal appearance.   HENT:      Head: Normocephalic and atraumatic.   Cardiovascular:      Rate and Rhythm: Normal rate and regular rhythm.   Pulmonary:      Effort: Pulmonary effort is normal.      Breath sounds: Normal breath sounds.   Abdominal:      General: There is no distension.      Palpations: Abdomen is soft.      Tenderness: There is abdominal tenderness.      Comments: She still pretty tender to palpation but her abdomen is soft.  She is not guarding and there is no rebound.  The incision looks good with no surrounding  erythema or drainage.   Neurological:      General: No focal deficit present.      Mental Status: She is alert and oriented to person, place, and time.   Psychiatric:         Mood and Affect: Mood normal.         Behavior: Behavior normal.           Results Review:       I reviewed the patient's new clinical results.  Results from last 7 days   Lab Units 06/05/21  0500 06/04/21  1144 06/03/21  0555 06/02/21  0613 06/01/21  0450 05/31/21 0449 05/30/21  1008   WBC 10*3/mm3 11.89* 14.26* 17.63* 8.95 10.14 9.02 10.76   HEMOGLOBIN g/dL 10.6* 11.4* 10.8* 9.8* 9.3* 8.8* 9.5*   PLATELETS 10*3/mm3 427 485* 409 360 318 343 383     Results from last 7 days   Lab Units 06/05/21  0500 06/04/21  1144 06/03/21  0555 06/02/21  0613 06/01/21  0450 05/31/21 0449 05/30/21  1009   SODIUM mmol/L 132* 132* 134* 133* 133* 136 136   POTASSIUM mmol/L 4.5 4.8 4.9 3.7 4.1 3.3* 4.0   CHLORIDE mmol/L 100 100 100 100 101 104 105   CO2 mmol/L 24.5 24.6 25.9 27.1 27.0 22.9 20.5*   BUN mg/dL 8 10 7* 4* 3* 3* 3*   CREATININE mg/dL 0.42* 0.50* 0.54* 0.48* 0.60 0.57 0.54*   CALCIUM mg/dL 8.1* 8.2 8.3 8.3 8.0* 7.7* 7.9*   MAGNESIUM mg/dL  --   --   --   --   --   --  2.0   Estimated Creatinine Clearance: 35.6 mL/min (A) (by C-G formula based on SCr of 0.42 mg/dL (L)).      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]  Yes   • Mitral valve regurgitation [I34.0]  Yes   • Leukocytosis [D72.829]  Yes   • Hyperglycemia [R73.9]  Yes   • Anemia [D64.9]  Yes   • Ischemic colitis (CMS/HCC) [K55.9]  Yes   • Iron deficiency anemia due to chronic blood loss [D50.0]  Yes   • PSVT (paroxysmal supraventricular tachycardia) (CMS/HCC) [I47.1]  Yes      Resolved Hospital Problems    Diagnosis Date Resolved POA   • Hypokalemia [E87.6] 06/04/2021 Yes       PLAN  Is a 89-year-old female that presented to the hospital with abdominal pain  and initially felt to have colitis and was found to have a cecal mass and is undergone a right hemicolectomy.  -She is postoperative day 3 remains on a clear liquid diet.  Patient desires advancement of her diet to full liquid diet.  She really just wants a bowl of tomato soup very badly.  -Her bowel sounds remains somewhat sluggish.  She is having a lot of gas pain and cramping.  Will defer advancement of the diet to general surgery when I have a chance to evaluate  -I do not see any need for continued telemetry monitoring at this time.  It can be discontinued  -The big rich we need to cross his nutrition.  Hopefully her bowel except and we can aggressively increase her diet.  Continue to encourage out of bed activity  -Mechanical DVT prophylaxis  -Limited DNR    Disposition  To be determined      Diallo Cha MD  [facility]  06/05/21  09:50 EDT

## 2021-06-06 ENCOUNTER — APPOINTMENT (OUTPATIENT)
Dept: GENERAL RADIOLOGY | Facility: HOSPITAL | Age: 86
End: 2021-06-06

## 2021-06-06 LAB
ALBUMIN SERPL-MCNC: 2.2 G/DL (ref 3.5–5.2)
ANION GAP SERPL CALCULATED.3IONS-SCNC: 5.1 MMOL/L (ref 5–15)
BASOPHILS # BLD AUTO: 0.04 10*3/MM3 (ref 0–0.2)
BASOPHILS NFR BLD AUTO: 0.4 % (ref 0–1.5)
BUN SERPL-MCNC: 5 MG/DL (ref 8–23)
BUN/CREAT SERPL: 11.6 (ref 7–25)
CALCIUM SPEC-SCNC: 7.8 MG/DL (ref 8.2–9.6)
CHLORIDE SERPL-SCNC: 101 MMOL/L (ref 98–107)
CO2 SERPL-SCNC: 24.9 MMOL/L (ref 22–29)
CREAT SERPL-MCNC: 0.43 MG/DL (ref 0.57–1)
DEPRECATED RDW RBC AUTO: 39 FL (ref 37–54)
EOSINOPHIL # BLD AUTO: 0.18 10*3/MM3 (ref 0–0.4)
EOSINOPHIL NFR BLD AUTO: 1.8 % (ref 0.3–6.2)
ERYTHROCYTE [DISTWIDTH] IN BLOOD BY AUTOMATED COUNT: 13.3 % (ref 12.3–15.4)
GFR SERPL CREATININE-BSD FRML MDRD: 136 ML/MIN/1.73
GLUCOSE SERPL-MCNC: 154 MG/DL (ref 65–99)
HCT VFR BLD AUTO: 29.5 % (ref 34–46.6)
HGB BLD-MCNC: 10.1 G/DL (ref 12–15.9)
IMM GRANULOCYTES # BLD AUTO: 0.09 10*3/MM3 (ref 0–0.05)
IMM GRANULOCYTES NFR BLD AUTO: 0.9 % (ref 0–0.5)
LYMPHOCYTES # BLD AUTO: 0.94 10*3/MM3 (ref 0.7–3.1)
LYMPHOCYTES NFR BLD AUTO: 9.2 % (ref 19.6–45.3)
MAGNESIUM SERPL-MCNC: 2 MG/DL (ref 1.7–2.3)
MCH RBC QN AUTO: 28.1 PG (ref 26.6–33)
MCHC RBC AUTO-ENTMCNC: 34.2 G/DL (ref 31.5–35.7)
MCV RBC AUTO: 82.2 FL (ref 79–97)
MONOCYTES # BLD AUTO: 1.02 10*3/MM3 (ref 0.1–0.9)
MONOCYTES NFR BLD AUTO: 9.9 % (ref 5–12)
NEUTROPHILS NFR BLD AUTO: 77.8 % (ref 42.7–76)
NEUTROPHILS NFR BLD AUTO: 8 10*3/MM3 (ref 1.7–7)
NRBC BLD AUTO-RTO: 0 /100 WBC (ref 0–0.2)
PHOSPHATE SERPL-MCNC: 1.9 MG/DL (ref 2.5–4.5)
PLATELET # BLD AUTO: 420 10*3/MM3 (ref 140–450)
PMV BLD AUTO: 8.3 FL (ref 6–12)
POTASSIUM SERPL-SCNC: 4.2 MMOL/L (ref 3.5–5.2)
RBC # BLD AUTO: 3.59 10*6/MM3 (ref 3.77–5.28)
SODIUM SERPL-SCNC: 131 MMOL/L (ref 136–145)
WBC # BLD AUTO: 10.27 10*3/MM3 (ref 3.4–10.8)

## 2021-06-06 PROCEDURE — 99024 POSTOP FOLLOW-UP VISIT: CPT | Performed by: SURGERY

## 2021-06-06 PROCEDURE — 80069 RENAL FUNCTION PANEL: CPT | Performed by: HOSPITALIST

## 2021-06-06 PROCEDURE — 85025 COMPLETE CBC W/AUTO DIFF WBC: CPT | Performed by: INTERNAL MEDICINE

## 2021-06-06 PROCEDURE — 36410 VNPNXR 3YR/> PHY/QHP DX/THER: CPT

## 2021-06-06 PROCEDURE — 4A02X4A MEASUREMENT OF CARDIAC ELECTRICAL ACTIVITY, GUIDANCE, EXTERNAL APPROACH: ICD-10-PCS | Performed by: HOSPITALIST

## 2021-06-06 PROCEDURE — 02HV33Z INSERTION OF INFUSION DEVICE INTO SUPERIOR VENA CAVA, PERCUTANEOUS APPROACH: ICD-10-PCS | Performed by: HOSPITALIST

## 2021-06-06 PROCEDURE — 25010000002 MAGNESIUM SULFATE PER 500 MG OF MAGNESIUM: Performed by: SURGERY

## 2021-06-06 PROCEDURE — C1751 CATH, INF, PER/CENT/MIDLINE: HCPCS

## 2021-06-06 PROCEDURE — 74018 RADEX ABDOMEN 1 VIEW: CPT

## 2021-06-06 PROCEDURE — 83735 ASSAY OF MAGNESIUM: CPT | Performed by: HOSPITALIST

## 2021-06-06 PROCEDURE — 25010000002 POTASSIUM CHLORIDE PER 2 MEQ OF POTASSIUM: Performed by: SURGERY

## 2021-06-06 PROCEDURE — 25010000002 CALCIUM GLUCONATE PER 10 ML: Performed by: SURGERY

## 2021-06-06 PROCEDURE — 3E0336Z INTRODUCTION OF NUTRITIONAL SUBSTANCE INTO PERIPHERAL VEIN, PERCUTANEOUS APPROACH: ICD-10-PCS | Performed by: SURGERY

## 2021-06-06 RX ORDER — SODIUM CHLORIDE 0.9 % (FLUSH) 0.9 %
10 SYRINGE (ML) INJECTION EVERY 12 HOURS SCHEDULED
Status: DISCONTINUED | OUTPATIENT
Start: 2021-06-06 | End: 2021-06-06

## 2021-06-06 RX ORDER — SODIUM CHLORIDE 0.9 % (FLUSH) 0.9 %
10 SYRINGE (ML) INJECTION AS NEEDED
Status: DISCONTINUED | OUTPATIENT
Start: 2021-06-06 | End: 2021-06-14 | Stop reason: HOSPADM

## 2021-06-06 RX ORDER — DEXTROSE, SODIUM CHLORIDE, AND POTASSIUM CHLORIDE 5; .45; .15 G/100ML; G/100ML; G/100ML
100 INJECTION INTRAVENOUS CONTINUOUS
Status: DISCONTINUED | OUTPATIENT
Start: 2021-06-06 | End: 2021-06-08

## 2021-06-06 RX ORDER — SODIUM CHLORIDE 0.9 % (FLUSH) 0.9 %
10 SYRINGE (ML) INJECTION AS NEEDED
Status: DISCONTINUED | OUTPATIENT
Start: 2021-06-06 | End: 2021-06-06

## 2021-06-06 RX ORDER — SODIUM CHLORIDE 0.9 % (FLUSH) 0.9 %
10 SYRINGE (ML) INJECTION EVERY 12 HOURS SCHEDULED
Status: DISCONTINUED | OUTPATIENT
Start: 2021-06-06 | End: 2021-06-14 | Stop reason: HOSPADM

## 2021-06-06 RX ORDER — SODIUM CHLORIDE 0.9 % (FLUSH) 0.9 %
20 SYRINGE (ML) INJECTION AS NEEDED
Status: DISCONTINUED | OUTPATIENT
Start: 2021-06-06 | End: 2021-06-14 | Stop reason: HOSPADM

## 2021-06-06 RX ADMIN — SODIUM CHLORIDE, PRESERVATIVE FREE 10 ML: 5 INJECTION INTRAVENOUS at 21:07

## 2021-06-06 RX ADMIN — SODIUM PHOSPHATE, MONOBASIC, MONOHYDRATE 20 MMOL: 276; 142 INJECTION, SOLUTION INTRAVENOUS at 13:17

## 2021-06-06 RX ADMIN — POTASSIUM CHLORIDE: 2 INJECTION, SOLUTION, CONCENTRATE INTRAVENOUS at 18:18

## 2021-06-06 RX ADMIN — SODIUM CHLORIDE, PRESERVATIVE FREE 10 ML: 5 INJECTION INTRAVENOUS at 21:05

## 2021-06-06 RX ADMIN — METOPROLOL TARTRATE 12.5 MG: 25 TABLET, FILM COATED ORAL at 21:05

## 2021-06-06 RX ADMIN — Medication 1 TABLET: at 09:49

## 2021-06-06 RX ADMIN — FAMOTIDINE 20 MG: 10 INJECTION INTRAVENOUS at 09:49

## 2021-06-06 RX ADMIN — SODIUM CHLORIDE, PRESERVATIVE FREE 10 ML: 5 INJECTION INTRAVENOUS at 09:50

## 2021-06-06 RX ADMIN — POTASSIUM CHLORIDE, DEXTROSE MONOHYDRATE AND SODIUM CHLORIDE 100 ML/HR: 150; 5; 450 INJECTION, SOLUTION INTRAVENOUS at 16:03

## 2021-06-06 RX ADMIN — METOPROLOL TARTRATE 12.5 MG: 25 TABLET, FILM COATED ORAL at 09:50

## 2021-06-06 RX ADMIN — SODIUM CHLORIDE, PRESERVATIVE FREE 10 ML: 5 INJECTION INTRAVENOUS at 21:06

## 2021-06-06 RX ADMIN — ASPIRIN 81 MG: 81 TABLET, COATED ORAL at 21:05

## 2021-06-06 RX ADMIN — POTASSIUM CHLORIDE, DEXTROSE MONOHYDRATE AND SODIUM CHLORIDE 100 ML/HR: 150; 5; 450 INJECTION, SOLUTION INTRAVENOUS at 04:04

## 2021-06-06 NOTE — PLAN OF CARE
Goal Outcome Evaluation:         VSS. PICC line and TPN started this evening. Phos replaced. Family at bedside. Ambulated 2 laps around nursing station x 3. Up to chair x 2.

## 2021-06-06 NOTE — PLAN OF CARE
Goal Outcome Evaluation:  Plan of Care Reviewed With: patient  Progress: no change  Outcome Summary: VSS. Alertx4. No complaints of pain. Abd is distended with hypoactive bowel sounds, very little gas. KUB was done. Will CTM

## 2021-06-06 NOTE — SIGNIFICANT NOTE
06/06/21 1523   Midline Catheter - Double Lumen 06/06/21 Left Basilic   Placement Date/Time: 06/06/21 1520   Hand Hygiene Completed: Yes  Site Prep: Chlorhexidine  All 5 Sterile Barriers Used (Gloves, Gown, Cap, Mask, Large Sterile Drape): Yes  Orientation: Left  Location: Basilic  Size (Fr): (c) 4  Initial Exposed Cathet...   Site Assessment Clean;Dry;Intact   #1 Lumen Status Blood return noted;Capped;Flushed;Normal saline locked   #2 Lumen Status Blood return noted;Capped;Flushed;Normal saline locked   Length marleen (cm) 11 cm   Extremity Circumference (cm) 32 cm   Dressing Type Border Dressing;Transparent;Securing device;Antimicrobial dressing/disc   Dressing Status Clean;Dry;Intact   Dressing Intervention New dressing   Liquid Adhesive Contraindicated (comment)  (Patient going home)   Dressing Change Due 06/13/21   Indication/Daily Review of Necessity intravenous medication therapy

## 2021-06-06 NOTE — PROGRESS NOTES
"Pharmacy Consult - Total Parental Nutrition (Initial Note)    Jaqueline Rojas has been consulted for pharmacy to dose TPN for prolonged paralytic ileus per Dr. Paez's request.     Relevant clinical data and objective history reviewed:  89 y.o. female 162.6 cm (64.02\") 54.9 kg (121 lb)    Past Medical History:   Diagnosis Date    Afib (CMS/HCC)     CANCINO (dyspnea on exertion)     Frequent PVCs     multifocal    Hypotension     Irregular heart beat     Knee injury     Mitral regurgitation     Moderate to Severe per Echocardiogram 8/2016    Palpitations     PONV (postoperative nausea and vomiting)     Supraventricular tachycardia (CMS/HCC)     Wrist fracture      has No Known Allergies.    Lab Results   Component Value Date    WBC 10.27 06/06/2021    HGB 10.1 (L) 06/06/2021    HCT 29.5 (L) 06/06/2021    MCV 82.2 06/06/2021     06/06/2021     Lab Results   Component Value Date    GLUCOSE 154 (H) 06/06/2021    BUN 5 (L) 06/06/2021    CREATININE 0.43 (L) 06/06/2021    EGFRIFNONA 136 06/06/2021    BCR 11.6 06/06/2021    CO2 24.9 06/06/2021    CALCIUM 7.8 (L) 06/06/2021    ALBUMIN 2.20 (L) 06/06/2021    AST 16 06/03/2021    ALT 11 06/03/2021     Estimated Creatinine Clearance: 35.6 mL/min (A) (by C-G formula based on SCr of 0.43 mg/dL (L)).    Active fluid orders: D5 1/2 Nss +20 kcl at 100ml/hr (stop when tpn starts)  Dietary Orders (From admission, onward)       Start     Ordered    06/06/21 1317  NPO Diet NPO Except: Ice Chips, Sips With Meds  Diet Effective Now     Question Answer Comment   NPO Except: Ice Chips    NPO Except: Sips With Meds        06/06/21 1316                  Assessment  Ideal Body Weight: 55 kg  Adjusted Body Weight: 55 kg  Basal Energy Expenditure:1025 kCal/Day  Daily Est. Chase: 1476 kCal/Day    Goal   Protein: (85 grams/day)   Dextrose: 1200 Kcal/day    Lipids: 100 ml of 20% lipid infusion 3 days/week   Volume: 1800 ml (75 ml/hr over 24 hrs daily)    Plan  Will start tpn at lower than goal " to taper up as patient tolerates. Patient has had only clear liquids since before surgery on 6/2/21. Also Na+ and Phos are low (pt received a 20 mM run of NaPHos today). Will initiate the TPN with the following macros and add some extra NaPhos with the standard formulation additives:    Protein:  (65 grams/day)   Dextrose: 800 Kcal/day    Lipids: holding until other macrolytes at goal (will probably start tomorrow)   Volume: 960 ml (40 ml/hr over 24 hrs daily)  Based on the above labs, will add the following electrolytes/additives to the TPN.    Sodium Chloride:70 mEq   Sodium Phosphate: 20 mEq   Potassium Chloride: 50 mEq   Potassium Phosphate: 22mEq   Calcium Gluconate: 9 mEq   Magnesium Sulfate: 10 mEq   Multivitamin: 10ml on MWF due to shortage            Trace Elements: 1ml    Labs to be ordered: Mg, phos, triglycerides (BMP already active)    Pharmacy will continue to follow.     Sandro Cullen [facility]

## 2021-06-06 NOTE — PROGRESS NOTES
Postoperative day 4 right colectomy    No emesis but no bowel function.    Afebrile vital signs stable.  Abdomen remains distended.    Persistent ileus.  Will start TPN.

## 2021-06-06 NOTE — PROGRESS NOTES
[phone]   LOS: 13 days     Name: Jaqueline Rojas  Age/Sex: 89 y.o. female  :  1924        PCP: Provider, No Known  Chief Complaint   Patient presents with   • Abdominal Pain      Subjective   She denies any nausea or vomiting but remains distended and bloated.  Abdominal pain seems to be improved compared to yesterday.  Made n.p.o. last evening  General: No Fever or Chills, Cardiac: No Chest Pain or Palpitations, Resp: No Cough or SOA, GI: No Nausea, Vomiting, or Diarrhea and Other: No bleeding    aspirin, 81 mg, Oral, Daily  digoxin, 0.5 mg, Intravenous, Once  famotidine, 20 mg, Intravenous, Daily  metoprolol tartrate, 12.5 mg, Oral, Q12H  multivitamin, 1 tablet, Oral, Daily  sodium chloride, 10 mL, Intravenous, Q12H      dextrose 5 % and sodium chloride 0.45 % with KCl 20 mEq/L, 100 mL/hr, Last Rate: 100 mL/hr (21 0404)        Objective   Vital Signs  Temp:  [97.4 °F (36.3 °C)-97.6 °F (36.4 °C)] 97.6 °F (36.4 °C)  Heart Rate:  [75-97] 97  Resp:  [16-18] 16  BP: (133-157)/(66-95) 143/95  Body mass index is 20.76 kg/m².    Intake/Output Summary (Last 24 hours) at 2021 0841  Last data filed at 2021 0404  Gross per 24 hour   Intake --   Output 1050 ml   Net -1050 ml       Physical Exam  Vitals and nursing note reviewed.   Constitutional:       Appearance: Normal appearance.   HENT:      Head: Normocephalic and atraumatic.   Cardiovascular:      Rate and Rhythm: Normal rate and regular rhythm.   Pulmonary:      Effort: Pulmonary effort is normal.      Breath sounds: Normal breath sounds.   Abdominal:      General: There is distension.      Palpations: Abdomen is soft.      Tenderness: There is abdominal tenderness.      Comments: She still pretty tender to palpation but her abdomen is soft.  She is not guarding and there is no rebound.  The incision looks good with no surrounding erythema or drainage.   Neurological:      General: No focal deficit present.      Mental Status: She is alert  and oriented to person, place, and time.   Psychiatric:         Mood and Affect: Mood normal.         Behavior: Behavior normal.           Results Review:       I reviewed the patient's new clinical results.  Results from last 7 days   Lab Units 06/06/21  0654 06/05/21  0500 06/04/21  1144 06/03/21  0555 06/02/21  0613 06/01/21  0450 05/31/21  0449   WBC 10*3/mm3 10.27 11.89* 14.26* 17.63* 8.95 10.14 9.02   HEMOGLOBIN g/dL 10.1* 10.6* 11.4* 10.8* 9.8* 9.3* 8.8*   PLATELETS 10*3/mm3 420 427 485* 409 360 318 343     Results from last 7 days   Lab Units 06/06/21  0654 06/05/21  0500 06/04/21  1144 06/03/21  0555 06/02/21  0613 06/01/21  0450 05/31/21  0449 05/30/21  1009   SODIUM mmol/L 131* 132* 132* 134* 133* 133* 136 136   POTASSIUM mmol/L 4.2 4.5 4.8 4.9 3.7 4.1 3.3* 4.0   CHLORIDE mmol/L 101 100 100 100 100 101 104 105   CO2 mmol/L 24.9 24.5 24.6 25.9 27.1 27.0 22.9 20.5*   BUN mg/dL 5* 8 10 7* 4* 3* 3* 3*   CREATININE mg/dL 0.43* 0.42* 0.50* 0.54* 0.48* 0.60 0.57 0.54*   CALCIUM mg/dL 7.8* 8.1* 8.2 8.3 8.3 8.0* 7.7* 7.9*   MAGNESIUM mg/dL 2.0  --   --   --   --   --   --  2.0   PHOSPHORUS mg/dL 1.9*  --   --   --   --   --   --   --    Estimated Creatinine Clearance: 35.6 mL/min (A) (by C-G formula based on SCr of 0.43 mg/dL (L)).      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]  Yes   • Mitral valve regurgitation [I34.0]  Yes   • Leukocytosis [D72.829]  Yes   • Hyperglycemia [R73.9]  Yes   • Anemia [D64.9]  Yes   • Ischemic colitis (CMS/HCC) [K55.9]  Yes   • Iron deficiency anemia due to chronic blood loss [D50.0]  Yes   • PSVT (paroxysmal supraventricular tachycardia) (CMS/HCC) [I47.1]  Yes      Resolved Hospital Problems    Diagnosis Date Resolved POA   • Hypokalemia [E87.6] 06/04/2021 Yes       PLAN  Is a 89-year-old female that presented to the hospital with abdominal pain  and initially felt to have colitis and was found to have a cecal mass and is undergone a right hemicolectomy.  -She is postoperative day 4, unfortunately dealing with a progressive postoperative ileus  -Abdomen is more distended today than yesterday.  When I saw her yesterday in the morning her abdomen remains somewhat soft.  -Her bowel sounds remains somewhat sluggish.  X-rays independently reviewed and does show persistent postoperative ileus  -I do not see any need for continued telemetry monitoring at this time.  It can be discontinued  -The big rich we need to cross his nutrition.  Given the regression in her diet I think TPN would be in her best interest.  I reached out to surgery to see if they think this is appropriate.  -Mechanical DVT prophylaxis  -Limited DNR    Disposition  To be determined      Diallo Cha MD  [facility]  06/06/21  08:41 EDT

## 2021-06-06 NOTE — SIGNIFICANT NOTE
06/06/21 1749   PICC Double Lumen 06/06/21 Right Basilic   Placement Date/Time: 06/06/21 1748   Hand Hygiene Completed: Yes  Size (Fr): 5  Description (optional): [id number]   exp 07-  Length (cm): 42 cm  Orientation: Right  Location: Basilic  Site Prep: Chlorhexidine isopropyl alcohol  All 5 Sterile Bar...   #1 Lumen Status Blood return noted;Capped;Flushed;Normal saline locked   #2 Lumen Status Blood return noted;Capped;Flushed;Normal saline locked   Length marleen (cm) 42 cm   Extremity Circumference (cm) 29 cm   Dressing Type Border Dressing;Transparent;Securing device;Antimicrobial dressing/disc   Liquid Adhesive Contraindicated (comment)  (pt skin very fragile)   Dressing Change Due 06/13/21   Indication/Daily Review of Necessity blood sampling;intravenous fluid therapy;intravenous medication therapy;total parenteral nutrition       PICC is approved for use

## 2021-06-07 LAB
ALBUMIN SERPL-MCNC: 2.8 G/DL (ref 3.5–5.2)
ALBUMIN/GLOB SERPL: 1.1 G/DL
ALP SERPL-CCNC: 43 U/L (ref 39–117)
ALT SERPL W P-5'-P-CCNC: 11 U/L (ref 1–33)
ANION GAP SERPL CALCULATED.3IONS-SCNC: 7.5 MMOL/L (ref 5–15)
AST SERPL-CCNC: 13 U/L (ref 1–32)
BASOPHILS # BLD AUTO: 0.02 10*3/MM3 (ref 0–0.2)
BASOPHILS NFR BLD AUTO: 0.2 % (ref 0–1.5)
BILIRUB SERPL-MCNC: 0.3 MG/DL (ref 0–1.2)
BUN SERPL-MCNC: 7 MG/DL (ref 8–23)
BUN/CREAT SERPL: 15.2 (ref 7–25)
CALCIUM SPEC-SCNC: 8.1 MG/DL (ref 8.2–9.6)
CHLORIDE SERPL-SCNC: 101 MMOL/L (ref 98–107)
CO2 SERPL-SCNC: 26.5 MMOL/L (ref 22–29)
CREAT SERPL-MCNC: 0.46 MG/DL (ref 0.57–1)
DEPRECATED RDW RBC AUTO: 40.7 FL (ref 37–54)
EOSINOPHIL # BLD AUTO: 0.09 10*3/MM3 (ref 0–0.4)
EOSINOPHIL NFR BLD AUTO: 1.1 % (ref 0.3–6.2)
ERYTHROCYTE [DISTWIDTH] IN BLOOD BY AUTOMATED COUNT: 13.4 % (ref 12.3–15.4)
GFR SERPL CREATININE-BSD FRML MDRD: 126 ML/MIN/1.73
GLOBULIN UR ELPH-MCNC: 2.6 GM/DL
GLUCOSE SERPL-MCNC: 169 MG/DL (ref 65–99)
HCT VFR BLD AUTO: 32.1 % (ref 34–46.6)
HGB BLD-MCNC: 10.5 G/DL (ref 12–15.9)
IMM GRANULOCYTES # BLD AUTO: 0.05 10*3/MM3 (ref 0–0.05)
IMM GRANULOCYTES NFR BLD AUTO: 0.6 % (ref 0–0.5)
LYMPHOCYTES # BLD AUTO: 1 10*3/MM3 (ref 0.7–3.1)
LYMPHOCYTES NFR BLD AUTO: 12.3 % (ref 19.6–45.3)
MAGNESIUM SERPL-MCNC: 1.9 MG/DL (ref 1.7–2.3)
MCH RBC QN AUTO: 27.6 PG (ref 26.6–33)
MCHC RBC AUTO-ENTMCNC: 32.7 G/DL (ref 31.5–35.7)
MCV RBC AUTO: 84.3 FL (ref 79–97)
MONOCYTES # BLD AUTO: 1.21 10*3/MM3 (ref 0.1–0.9)
MONOCYTES NFR BLD AUTO: 14.9 % (ref 5–12)
NEUTROPHILS NFR BLD AUTO: 5.74 10*3/MM3 (ref 1.7–7)
NEUTROPHILS NFR BLD AUTO: 70.9 % (ref 42.7–76)
NRBC BLD AUTO-RTO: 0 /100 WBC (ref 0–0.2)
PHOSPHATE SERPL-MCNC: 2.7 MG/DL (ref 2.5–4.5)
PLATELET # BLD AUTO: 454 10*3/MM3 (ref 140–450)
PMV BLD AUTO: 8.5 FL (ref 6–12)
POTASSIUM SERPL-SCNC: 3.9 MMOL/L (ref 3.5–5.2)
PROT SERPL-MCNC: 5.4 G/DL (ref 6–8.5)
RBC # BLD AUTO: 3.81 10*6/MM3 (ref 3.77–5.28)
SODIUM SERPL-SCNC: 135 MMOL/L (ref 136–145)
TRIGL SERPL-MCNC: 96 MG/DL (ref 0–150)
WBC # BLD AUTO: 8.11 10*3/MM3 (ref 3.4–10.8)

## 2021-06-07 PROCEDURE — 83735 ASSAY OF MAGNESIUM: CPT | Performed by: SURGERY

## 2021-06-07 PROCEDURE — 25010000002 MAGNESIUM SULFATE PER 500 MG OF MAGNESIUM: Performed by: SURGERY

## 2021-06-07 PROCEDURE — 25010000002 POTASSIUM CHLORIDE PER 2 MEQ OF POTASSIUM: Performed by: SURGERY

## 2021-06-07 PROCEDURE — 85025 COMPLETE CBC W/AUTO DIFF WBC: CPT | Performed by: INTERNAL MEDICINE

## 2021-06-07 PROCEDURE — 84478 ASSAY OF TRIGLYCERIDES: CPT | Performed by: SURGERY

## 2021-06-07 PROCEDURE — 84100 ASSAY OF PHOSPHORUS: CPT | Performed by: SURGERY

## 2021-06-07 PROCEDURE — 99024 POSTOP FOLLOW-UP VISIT: CPT | Performed by: SURGERY

## 2021-06-07 PROCEDURE — 80053 COMPREHEN METABOLIC PANEL: CPT | Performed by: HOSPITALIST

## 2021-06-07 PROCEDURE — 25010000002 CALCIUM GLUCONATE PER 10 ML: Performed by: SURGERY

## 2021-06-07 PROCEDURE — 97110 THERAPEUTIC EXERCISES: CPT

## 2021-06-07 RX ADMIN — SMOFLIPID 100 ML: 6; 6; 5; 3 INJECTION, EMULSION INTRAVENOUS at 18:21

## 2021-06-07 RX ADMIN — SODIUM CHLORIDE, PRESERVATIVE FREE 10 ML: 5 INJECTION INTRAVENOUS at 08:37

## 2021-06-07 RX ADMIN — SODIUM CHLORIDE, PRESERVATIVE FREE 10 ML: 5 INJECTION INTRAVENOUS at 20:27

## 2021-06-07 RX ADMIN — FAMOTIDINE 20 MG: 10 INJECTION INTRAVENOUS at 08:36

## 2021-06-07 RX ADMIN — ACETAMINOPHEN 650 MG: 325 TABLET, FILM COATED ORAL at 00:58

## 2021-06-07 RX ADMIN — SODIUM CHLORIDE, PRESERVATIVE FREE 10 ML: 5 INJECTION INTRAVENOUS at 08:36

## 2021-06-07 RX ADMIN — Medication 1 TABLET: at 08:36

## 2021-06-07 RX ADMIN — ASPIRIN 81 MG: 81 TABLET, COATED ORAL at 20:27

## 2021-06-07 RX ADMIN — METOPROLOL TARTRATE 12.5 MG: 25 TABLET, FILM COATED ORAL at 20:27

## 2021-06-07 RX ADMIN — POTASSIUM CHLORIDE: 2 INJECTION, SOLUTION, CONCENTRATE INTRAVENOUS at 18:21

## 2021-06-07 RX ADMIN — METOPROLOL TARTRATE 12.5 MG: 25 TABLET, FILM COATED ORAL at 08:36

## 2021-06-07 RX ADMIN — SODIUM CHLORIDE, PRESERVATIVE FREE 10 ML: 5 INJECTION INTRAVENOUS at 20:29

## 2021-06-07 NOTE — CASE MANAGEMENT/SOCIAL WORK
Continued Stay Note  [facility]     Patient Name: Jaqueline Rojas  MRN: [id number]  Today's Date: 6/7/2021    Admit Date: 5/24/2021    Discharge Plan     Row Name 06/07/21 1557       Plan    Plan  Return home with family    Patient/Family in Agreement with Plan  yes    Plan Comments  Spoke with patient at bedside.  She is currently on TPN.  Plan remains for her to return home at [place].  May consider HH.  CCP will continue to follow.  BHumeniuk RN Becky S. Humeniuk, RN

## 2021-06-07 NOTE — PROGRESS NOTES
Adult Nutrition  Assessment/PES    Patient Name:  Jaqueline Rojas  YOB: 1924  MRN: [id number]  Admit Date:  5/24/2021    Assessment Date:  6/7/2021    Comments:  Follow up note. Pt s/p right hemicolectomy. She has no N/V, passing flatus but no BM. Full liquid diet started to stimulate GI activity.TPN ordered for 800 dextrose, 65gm AA, at 40ml/hr meeting 64% kcals and 100% of minimal protein needs. Will monitor po intake and follow for toleration. Till po established, rec 1200 dextrose, 65gm AA, and 20% 100ml lipids.    Reason for Assessment     Row Name 06/07/21 1023          Reason for Assessment    Reason For Assessment  follow-up protocol;TF/PN         Nutrition/Diet History     Row Name 06/07/21 1024          Nutrition/Diet History    Typical Food/Fluid Intake  Full liquid diet started and TPN           Labs/Tests/Procedures/Meds     Row Name 06/07/21 1024          Labs/Procedures/Meds    Lab Results Reviewed  reviewed     Lab Results Comments  na, gu, alb        Diagnostic Tests/Procedures    Diagnostic Test/Procedure Reviewed  reviewed        Medications    Pertinent Medications Reviewed  reviewed     Pertinent Medications Comments  TPN at 40ml/hr, asa, pepcid, MVI,,         Physical Findings     Row Name 06/07/21 1025          Physical Findings    Skin  surgical incision         Estimated/Assessed Needs     Row Name 06/07/21 1025          Calculation Measurements    Weight Used For Calculations  54.9 kg (121 lb 0.5 oz)        Estimated/Assessed Needs    Additional Documentation  KCAL/KG (Group);Fluid Requirements (Group);Protein Requirements (Group)        KCAL/KG    KCAL/KG  30 Kcal/Kg (kcal)     30 Kcal/Kg (kcal)  1647        Protein Requirements    Weight Used For Protein Calculations  54.9 kg (121 lb 0.5 oz)     Est Protein Requirement Amount (gms/kg)  1.2 gm protein     Estimated Protein Requirements (gms/day)  65.88        Fluid Requirements    Fluid Requirements (mL/day)  1647     RDA  Method (mL)  1647         Nutrition Prescription Ordered     Row Name 06/07/21 1026          Nutrition Prescription PO    Current PO Diet  Full Liquid        Nutrition Prescription PN    PN Current Rate (mL/hr)  40 mL/hr     Dextrose (Kcal)  800     Amino Acid (gm)  65         Evaluation of Received Nutrient/Fluid Intake     Row Name 06/07/21 1027          Calories Evaluation    Parenteral Calories (kcal)  1060     % of Kcal Needs  64        Protein Evaluation    Parenteral Protein (gm)  65     % of Protein Needs  100               Problem/Interventions:    Problem 2     Row Name 06/07/21 1029          Nutrition Diagnoses Problem 2    Problem 2  Needs Alternate Route     Etiology (related to)  Medical Diagnosis             Intervention Goal     Row Name 06/07/21 1029          Intervention Goal    General  Maintain nutrition;Nutrition support treatment;Improved nutrition related lab(s);Reduce/improve symptoms;Meet nutritional needs for age/condition;Disease management/therapy     PO  Initiate feeding;Tolerate PO     TF/PN  Adjust TF/PN     Transition  PN to PO     Weight  Maintain weight         Nutrition Intervention     Row Name 06/07/21 1029          Nutrition Intervention    RD/Tech Action  Follow Tx progress;Care plan reviewd         Nutrition Prescription     Row Name 06/07/21 1031          Nutrition Prescription PN    Parenteral Prescription  PN begin/change     Dextrose (Kcal)  1200     Amino Acid (gm)  65     Lipid Concentration (%)  20%     Lipid Volume (mL)  100 mL         Education/Evaluation     Row Name 06/07/21 1029          Education    Education  Will Instruct as appropriate        Monitor/Evaluation    Monitor  Per protocol;I&O;Pertinent labs;PN delivery/tolerance;Weight;Symptoms           Electronically signed by:  Yasmeen Wiley RD  06/07/21 10:31 EDT

## 2021-06-07 NOTE — PLAN OF CARE
Goal Outcome Evaluation:  Plan of Care Reviewed With: patient        Progress: improving  Outcome Summary: Pt seen for PT this am. She is doing well at this time and improving with mobility. Pt states she has been ambulating everyday without difficulty. Today, pt able to ambulate 300 ft with Rwx and SBA. No unsteadiness noted. Pt plans home with her daughter. Encouraged pt to continue ambulating with nsg. No further acute PT needs at this time. Will sign off.  Patient was intermittently wearing a face mask during this therapy encounter. Therapist used appropriate personal protective equipment including eye protection, mask, and gloves.  Mask used was standard procedure mask. Appropriate PPE was worn during the entire therapy session. Hand hygiene was completed before and after therapy session. Patient is not in enhanced droplet precautions.

## 2021-06-07 NOTE — PROGRESS NOTES
"Pharmacy Consult - Total Parental Nutrition (Follow-up Note)    Jaqueline Crystal has been consulted for pharmacy to dose TPN for prolonged paralytic ileus per Dr. Paez's request.     Day 2 of TPN    Relevant clinical data and objective history reviewed:  89 y.o. female 162.6 cm (64.02\") 54.9 kg (121 lb)    Past Medical History:   Diagnosis Date   • Afib (CMS/HCC)    • CANCINO (dyspnea on exertion)    • Frequent PVCs     multifocal   • Hypotension    • Irregular heart beat    • Knee injury    • Mitral regurgitation     Moderate to Severe per Echocardiogram 8/2016   • Palpitations    • PONV (postoperative nausea and vomiting)    • Supraventricular tachycardia (CMS/HCC)    • Wrist fracture      has No Known Allergies.    Lab Results   Component Value Date    WBC 8.11 06/07/2021    HGB 10.5 (L) 06/07/2021    HCT 32.1 (L) 06/07/2021    MCV 84.3 06/07/2021     (H) 06/07/2021     Lab Results   Component Value Date    GLUCOSE 169 (H) 06/07/2021    BUN 7 (L) 06/07/2021    CREATININE 0.46 (L) 06/07/2021    EGFRIFNONA 126 06/07/2021    BCR 15.2 06/07/2021    CO2 26.5 06/07/2021    CALCIUM 8.1 (L) 06/07/2021    ALBUMIN 2.80 (L) 06/07/2021    AST 13 06/07/2021    ALT 11 06/07/2021     Estimated Creatinine Clearance: 35.6 mL/min (A) (by C-G formula based on SCr of 0.46 mg/dL (L)).    Active fluid orders: D5 1/2 Nss +20 kcl at 100ml/hr (stop when tpn starts)  Dietary Orders (From admission, onward)     Start     Ordered    06/07/21 0926  Diet Full Liquid  Diet Effective Now     Question:  Diet Texture / Consistency  Answer:  Full Liquid    06/07/21 0925              Assessment  Ideal Body Weight: 55 kg  Adjusted Body Weight: 55 kg  Basal Energy Expenditure:1025 kCal/Day  Daily Est. Chase: 1476 kCal/Day    Goal   Protein: 65 grams per day per RD recs   Dextrose: 1200 Kcal/day    Lipids: 100 ml of 20% lipid infusion 3 days/week   Volume: 1800 ml (75 ml/hr over 24 hrs daily)    Plan  · Noted plans to transition patient to full " liquid diet; D5+ 1/2NS + 20 KCL stopped yesterday at 1812 when TPN started  · Will increase the rate of the TPN today to 60 mL/hr (1440 mL/24hr period)   · Noted that patient is passing flatus but has yet to have a bowel movement.      Protein:  (65 grams/day) - keep per RD recommendation   Dextrose: 900 Kcal/day (increased from 800 Kcal/day) - slowly increase towards goal to mitigate risk of refeeding syndrome   Lipids: SMOF Lipids 20% 100 mL M,W,F   Volume: 1560 ml (60 ml/hr over 24 hrs daily)  Based on the above labs, will add the following electrolytes/additives to the TPN.    Sodium Chloride:80 mEq (increased from 70)    Sodium Phosphate: 0 mEq (decreased from 20)   Potassium Chloride: 50 mEq   Potassium Phosphate: 22mEq   Calcium Gluconate: 9 mEq   Magnesium Sulfate: 10 mEq   Multivitamin: 0 - getting PO MCI        Trace Elements: 1ml    Labs to be ordered: Mg, phos, CMP    Pharmacy will continue to follow.     Rodolfo Blackman, IliaD

## 2021-06-07 NOTE — PROGRESS NOTES
[phone]   LOS: 14 days     Name: Jaqueline Rojas  Age/Sex: 89 y.o. female  :  1924        PCP: Provider, No Known  Chief Complaint   Patient presents with   • Abdominal Pain      Subjective   She denies any nausea or vomiting but remains distended and bloated.  Abdominal pain seems to be improving and she has had a small bowel movement with flatus today  General: No Fever or Chills, Cardiac: No Chest Pain or Palpitations, Resp: No Cough or SOA, GI: No Nausea, Vomiting, or Diarrhea and Other: No bleeding    aspirin, 81 mg, Oral, Daily  digoxin, 0.5 mg, Intravenous, Once  famotidine, 20 mg, Intravenous, Daily  metoprolol tartrate, 12.5 mg, Oral, Q12H  multivitamin, 1 tablet, Oral, Daily  sodium chloride, 10 mL, Intravenous, Q12H  sodium chloride, 10 mL, Intravenous, Q12H  sodium chloride, 10 mL, Intravenous, Q12H      Adult Central 2-in-1 TPN, , Last Rate: 40 mL/hr at 21  dextrose 5 % and sodium chloride 0.45 % with KCl 20 mEq/L, 100 mL/hr, Last Rate: Stopped (21)  Pharmacy to Dose TPN,         Objective   Vital Signs  Temp:  [97.2 °F (36.2 °C)-97.8 °F (36.6 °C)] 97.8 °F (36.6 °C)  Heart Rate:  [74-83] 74  Resp:  [16] 16  BP: (134-166)/(75-98) 142/78  Body mass index is 20.76 kg/m².    Intake/Output Summary (Last 24 hours) at 2021 0801  Last data filed at 2021 0604  Gross per 24 hour   Intake 90 ml   Output 1500 ml   Net -1410 ml       Physical Exam  Vitals and nursing note reviewed.   Constitutional:       Appearance: Normal appearance.   HENT:      Head: Normocephalic and atraumatic.   Cardiovascular:      Rate and Rhythm: Normal rate and regular rhythm.   Pulmonary:      Effort: Pulmonary effort is normal.      Breath sounds: Normal breath sounds.   Abdominal:      General: There is distension.      Palpations: Abdomen is soft.      Tenderness: There is abdominal tenderness.      Comments: She still pretty tender to palpation but her abdomen is soft.  She is not guarding  and there is no rebound.  The incision looks good with no surrounding erythema or drainage.   Neurological:      General: No focal deficit present.      Mental Status: She is alert and oriented to person, place, and time.   Psychiatric:         Mood and Affect: Mood normal.         Behavior: Behavior normal.           Results Review:       I reviewed the patient's new clinical results.  Results from last 7 days   Lab Units 06/07/21  0701 06/06/21  0654 06/05/21  0500 06/04/21  1144 06/03/21  0555 06/02/21  0613 06/01/21  0450   WBC 10*3/mm3 8.11 10.27 11.89* 14.26* 17.63* 8.95 10.14   HEMOGLOBIN g/dL 10.5* 10.1* 10.6* 11.4* 10.8* 9.8* 9.3*   PLATELETS 10*3/mm3 454* 420 427 485* 409 360 318     Results from last 7 days   Lab Units 06/06/21  0654 06/05/21  0500 06/04/21  1144 06/03/21  0555 06/02/21  0613 06/01/21  0450   SODIUM mmol/L 131* 132* 132* 134* 133* 133*   POTASSIUM mmol/L 4.2 4.5 4.8 4.9 3.7 4.1   CHLORIDE mmol/L 101 100 100 100 100 101   CO2 mmol/L 24.9 24.5 24.6 25.9 27.1 27.0   BUN mg/dL 5* 8 10 7* 4* 3*   CREATININE mg/dL 0.43* 0.42* 0.50* 0.54* 0.48* 0.60   CALCIUM mg/dL 7.8* 8.1* 8.2 8.3 8.3 8.0*   MAGNESIUM mg/dL 2.0  --   --   --   --   --    PHOSPHORUS mg/dL 1.9*  --   --   --   --   --    Estimated Creatinine Clearance: 35.6 mL/min (A) (by C-G formula based on SCr of 0.43 mg/dL (L)).      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]  Yes   • Mitral valve regurgitation [I34.0]  Yes   • Leukocytosis [D72.829]  Yes   • Hyperglycemia [R73.9]  Yes   • Anemia [D64.9]  Yes   • Ischemic colitis (CMS/HCC) [K55.9]  Yes   • Iron deficiency anemia due to chronic blood loss [D50.0]  Yes   • PSVT (paroxysmal supraventricular tachycardia) (CMS/HCC) [I47.1]  Yes      Resolved Hospital Problems    Diagnosis Date Resolved POA   • Hypokalemia [E87.6] 06/04/2021 Yes       PLAN  Is a  89-year-old female that presented to the hospital with abdominal pain and initially felt to have colitis and was found to have a cecal mass and is undergone a right hemicolectomy.  -She is postoperative day 5, unfortunately dealing with a progressive postoperative ileus  -Abdomen remains distended with sluggish bowel sounds.  -General surgery has initiated a full liquid diet today we will see how she tolerates  -I do not see any need for continued telemetry monitoring at this time.  It can be discontinued  -PICC line placed in right upper extremity tolerated well continue TPN  -Mechanical DVT prophylaxis  -Limited DNR    Disposition  To be determined      Diallo Cha MD  [facility]  06/07/21  08:01 EDT

## 2021-06-07 NOTE — PLAN OF CARE
Goal Outcome Evaluation:  Plan of Care Reviewed With: patient  Progress: no change  Outcome Summary: VSS. Pt up with assist x 1 to BSC while in chair and purewick while in bed at HS. Good UOP. Pt had very small BM last night for aide and said she was passing some flatus. Pt has an ileus that the MDs are treating conservatively (confirmed with KUB yesterday). Abd still very distended and taut/firm. Hypoactive but audible bowel sounds. Abd incision with staples CDI. Pt NPO except ice chips and sips with meds. Pt on TPN at 40 ml/hr, hung last evening by dayshift. Going through PIO PICC. Pt got tylenol x 1 for pain. No nausea. Pt encouraged to use IS and shift weight while in bed. Refused SCDs. Pt has slept well. No other issues. Will CTM.

## 2021-06-07 NOTE — PROGRESS NOTES
Chief Complaint:    S/P Right hemicolectomy, POD 5    Subjective:    The patient is generally feeling well with no nausea or vomiting.  She is hungry.  She has passed some flatus but no bowel movement    Objective:    Temp:  [97.2 °F (36.2 °C)-97.8 °F (36.6 °C)] 97.4 °F (36.3 °C)  Heart Rate:  [74-83] 79  Resp:  [16] 16  BP: (134-166)/(75-98) 152/84    Physical Exam  Constitutional:       Appearance: She is not ill-appearing or toxic-appearing.   Abdominal:      General: Bowel sounds are normal. There is distension.      Palpations: Abdomen is soft.      Tenderness: There is no abdominal tenderness.      Comments: Incision: Intact with no evidence of infection.   Neurological:      Mental Status: She is alert.   Psychiatric:         Behavior: Behavior is cooperative.         Results:    WBC is 8.11.  H/H is 10.5/32.1.  BUN is 7 and creatinine is 0.46.    Impression/Plan:    The patient is POD 5 from a right hemicolectomy for right colon cancer.  Her expected postop ileus is slowly resolving.  We will start full liquid diet to try to stimulate more GI activity.  Continue TPN until full return of bowel function.    Baldemar Griffin Jr., M.D.

## 2021-06-07 NOTE — PAYOR COMM NOTE
"Nanci Bridges (89 y.o. Female)     PLEASE SEE ATTACHED CLINICAL REVIEW.     REF#[id number]    PLEASE CALL   OR  [phone] WITH INPT CONTINUED STAY     THANK YOU    DOREEN ADAMS LPN CCP    Date of Birth Social Security Number Address Home Phone MRN    09/06/1924  [id number]  [facility] [address] [phone] [id number]    Alevism Marital Status          Presybeterian        Admission Date Admission Type Admitting Provider Attending Provider Department, Room/Bed    5/24/21 Emergency Samantha Leon MD Richards, Stephen J, MD [address], S615/1    Discharge Date Discharge Disposition Discharge Destination                       Attending Provider: Diallo Cha MD    Allergies: No Known Allergies    Isolation: None   Infection: None   Code Status: No CPR    Ht: 162.6 cm (64.02\")   Wt: 54.9 kg (121 lb)    Admission Cmt: None   Principal Problem: 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]                 Active Insurance as of 5/24/2021     Primary Coverage     Payor Plan Insurance Group Employer/Plan Group    AETNA MEDICARE REPLACEMENT AETNA MEDICARE REPLACEMENT [id number]     Payor Plan Address Payor Plan Phone Number Payor Plan Fax Number Effective Dates    [address] [phone]  1/1/2018 - None Entered    [facility] [id number]       Subscriber Name Subscriber Birth Date Member ID       NANCI BRIDGES 9/6/1924 [id number]                 Emergency Contacts      (Rel.) Home Phone Work Phone Mobile Phone    Sejal Muñoz (Daughter) [phone] -- [phone]    CrystalKayden (Son) [phone] -- [phone]    Magda Durham (Daughter) [phone] -- [phone]    Malena Bridges (Other) [phone] -- [phone]            Oxygen Therapy (last 2 days)     Date/Time   SpO2   Device (Oxygen Therapy)   Flow (L/min)   Oxygen Concentration (%)   ETCO2 (mmHg)    06/07/21 0841   --   room air   --   --   --    06/07/21 0700   96   --   -- "   --   --    06/07/21 0058   --   room air   --   --   --    06/06/21 2349   95   room air   --   --   --    06/06/21 2107   --   room air   --   --   --    06/06/21 1931   --   room air   --   --   --    06/06/21 1400   97   --   --   --   --    06/06/21 1317   --   room air   --   --   --    06/06/21 0957   --   room air   --   --   --    06/06/21 0800   95   --   --   --   --    06/06/21 0750   --   room air   --   --   --    06/06/21 0405   94   room air   --   --   --    06/06/21 0225   --   room air   --   --   --    06/05/21 2319   97   room air   --   --   --    06/05/21 2028   95   room air   --   --   --    06/05/21 2000   --   room air   --   --   --    06/05/21 1405   --   room air   --   --   --    06/05/21 1403   96   room air   --   --   --    06/05/21 0903   --   room air   --   --   --    06/05/21 0705   92   room air   --   --   --    06/05/21 0023   --   room air   --   --   --            Intake & Output (last 2 days)       06/05 0701 - 06/06 0700 06/06 0701 - 06/07 0700 06/07 0701 - 06/08 0700    P.O.  90     Total Intake(mL/kg)  90 (1.6)     Urine (mL/kg/hr) 1050 (0.8) 1500 (1.1)     Stool  0     Total Output 1050 1500     Net -1050 -1410            Urine Unmeasured Occurrence 5 x 2 x 1 x    Stool Unmeasured Occurrence  1 x 1 x          Blood Administration Record (From admission, onward)    None        Operative/Procedure Notes (last 48 hours) (Notes from 06/05/21 1137 through 06/07/21 1137)    No notes of this type exist for this encounter.          Physician Progress Notes (last 48 hours) (Notes from 06/05/21 1137 through 06/07/21 1137)      Baldemar Griffin Jr., MD at 06/07/21 8449        Chief Complaint:    S/P Right hemicolectomy, POD 5    Subjective:    The patient is generally feeling well with no nausea or vomiting.  She is hungry.  She has passed some flatus but no bowel movement    Objective:    Temp:  [97.2 °F (36.2 °C)-97.8 °F (36.6 °C)] 97.4 °F (36.3 °C)  Heart Rate:  [74-83]  79  Resp:  [16] 16  BP: (134-166)/(75-98) 152/84    Physical Exam  Constitutional:       Appearance: She is not ill-appearing or toxic-appearing.   Abdominal:      General: Bowel sounds are normal. There is distension.      Palpations: Abdomen is soft.      Tenderness: There is no abdominal tenderness.      Comments: Incision: Intact with no evidence of infection.   Neurological:      Mental Status: She is alert.   Psychiatric:         Behavior: Behavior is cooperative.         Results:    WBC is 8.11.  H/H is 10.5/32.1.  BUN is 7 and creatinine is 0.46.    Impression/Plan:    The patient is POD 5 from a right hemicolectomy for right colon cancer.  Her expected postop ileus is slowly resolving.  We will start full liquid diet to try to stimulate more GI activity.  Continue TPN until full return of bowel function.    Baldemar Griffin Jr., M.D.    Electronically signed by Baldemar Griffin Jr., MD at 21 0929     Kushal Paez MD at 21 1314        Postoperative day 4 right colectomy    No emesis but no bowel function.    Afebrile vital signs stable.  Abdomen remains distended.    Persistent ileus.  Will start TPN.    Electronically signed by Kushal Paez MD at 21 1316     Diallo Cha MD at 21 0841            [phone]   LOS: 13 days     Name: Jaqueline Rojas  Age/Sex: 89 y.o. female  :  1924        PCP: Provider, No Known  Chief Complaint   Patient presents with   • Abdominal Pain      Subjective   She denies any nausea or vomiting but remains distended and bloated.  Abdominal pain seems to be improved compared to yesterday.  Made n.p.o. last evening  General: No Fever or Chills, Cardiac: No Chest Pain or Palpitations, Resp: No Cough or SOA, GI: No Nausea, Vomiting, or Diarrhea and Other: No bleeding    aspirin, 81 mg, Oral, Daily  digoxin, 0.5 mg, Intravenous, Once  famotidine, 20 mg, Intravenous, Daily  metoprolol tartrate, 12.5 mg, Oral, Q12H  multivitamin, 1 tablet,  Oral, Daily  sodium chloride, 10 mL, Intravenous, Q12H      dextrose 5 % and sodium chloride 0.45 % with KCl 20 mEq/L, 100 mL/hr, Last Rate: 100 mL/hr (06/06/21 0404)        Objective   Vital Signs  Temp:  [97.4 °F (36.3 °C)-97.6 °F (36.4 °C)] 97.6 °F (36.4 °C)  Heart Rate:  [75-97] 97  Resp:  [16-18] 16  BP: (133-157)/(66-95) 143/95  Body mass index is 20.76 kg/m².    Intake/Output Summary (Last 24 hours) at 6/6/2021 0841  Last data filed at 6/6/2021 0404  Gross per 24 hour   Intake --   Output 1050 ml   Net -1050 ml       Physical Exam  Vitals and nursing note reviewed.   Constitutional:       Appearance: Normal appearance.   HENT:      Head: Normocephalic and atraumatic.   Cardiovascular:      Rate and Rhythm: Normal rate and regular rhythm.   Pulmonary:      Effort: Pulmonary effort is normal.      Breath sounds: Normal breath sounds.   Abdominal:      General: There is distension.      Palpations: Abdomen is soft.      Tenderness: There is abdominal tenderness.      Comments: She still pretty tender to palpation but her abdomen is soft.  She is not guarding and there is no rebound.  The incision looks good with no surrounding erythema or drainage.   Neurological:      General: No focal deficit present.      Mental Status: She is alert and oriented to person, place, and time.   Psychiatric:         Mood and Affect: Mood normal.         Behavior: Behavior normal.           Results Review:       I reviewed the patient's new clinical results.  Results from last 7 days   Lab Units 06/06/21  0654 06/05/21  0500 06/04/21  1144 06/03/21  0555 06/02/21  0613 06/01/21  0450 05/31/21  0449   WBC 10*3/mm3 10.27 11.89* 14.26* 17.63* 8.95 10.14 9.02   HEMOGLOBIN g/dL 10.1* 10.6* 11.4* 10.8* 9.8* 9.3* 8.8*   PLATELETS 10*3/mm3 420 427 485* 409 360 318 343     Results from last 7 days   Lab Units 06/06/21  0654 06/05/21  0500 06/04/21  1144 06/03/21  0555 06/02/21  0613 06/01/21  0450 05/31/21  0449 05/30/21  1009   SODIUM  mmol/L 131* 132* 132* 134* 133* 133* 136 136   POTASSIUM mmol/L 4.2 4.5 4.8 4.9 3.7 4.1 3.3* 4.0   CHLORIDE mmol/L 101 100 100 100 100 101 104 105   CO2 mmol/L 24.9 24.5 24.6 25.9 27.1 27.0 22.9 20.5*   BUN mg/dL 5* 8 10 7* 4* 3* 3* 3*   CREATININE mg/dL 0.43* 0.42* 0.50* 0.54* 0.48* 0.60 0.57 0.54*   CALCIUM mg/dL 7.8* 8.1* 8.2 8.3 8.3 8.0* 7.7* 7.9*   MAGNESIUM mg/dL 2.0  --   --   --   --   --   --  2.0   PHOSPHORUS mg/dL 1.9*  --   --   --   --   --   --   --    Estimated Creatinine Clearance: 35.6 mL/min (A) (by C-G formula based on SCr of 0.43 mg/dL (L)).      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]  Yes   • Mitral valve regurgitation [I34.0]  Yes   • Leukocytosis [D72.829]  Yes   • Hyperglycemia [R73.9]  Yes   • Anemia [D64.9]  Yes   • Ischemic colitis (CMS/HCC) [K55.9]  Yes   • Iron deficiency anemia due to chronic blood loss [D50.0]  Yes   • PSVT (paroxysmal supraventricular tachycardia) (CMS/HCC) [I47.1]  Yes      Resolved Hospital Problems    Diagnosis Date Resolved POA   • Hypokalemia [E87.6] 06/04/2021 Yes       PLAN  Is a 89-year-old female that presented to the hospital with abdominal pain and initially felt to have colitis and was found to have a cecal mass and is undergone a right hemicolectomy.  -She is postoperative day 4, unfortunately dealing with a progressive postoperative ileus  -Abdomen is more distended today than yesterday.  When I saw her yesterday in the morning her abdomen remains somewhat soft.  -Her bowel sounds remains somewhat sluggish.  X-rays independently reviewed and does show persistent postoperative ileus  -I do not see any need for continued telemetry monitoring at this time.  It can be discontinued  -The big rich we need to cross his nutrition.  Given the regression in her diet I think TPN would be in her best interest.  I reached out  to surgery to see if they think this is appropriate.  -Mechanical DVT prophylaxis  -Limited DNR    Disposition  To be determined      Diallo Cha MD  [facility]  06/06/21  08:41 EDT            Electronically signed by Diallo Cha MD at 06/06/21 1356     Kushal Paez MD at 06/05/21 1425        Postoperative day 3 right colectomy    Continues to have mild nausea but no emesis.  No bowel function.  Has taken minimal if any clears.    Afebrile vital signs stable.  Labs in good order.  Abdomen is moderate to markedly distended with absent bowel sounds.  Incision healing well.    Probable postoperative ileus.  Will make n.p.o. except ice chips, continue IV fluids, and encourage ambulation.    Electronically signed by Kushal Paez MD at 06/05/21 1426         All medication doses during the admission are shown, including meds that are no longer on order.  Scheduled Meds Sorted by Name  for Jaqueline Rojas as of 6/5/21 through 6/7/21    1 Day 3 Days 7 Days 10 Days < Today >    Legend:                          Inactive     Active     Other Encounter     Linked                 Medications 06/05/21 06/06/21 06/07/21   aspirin EC tablet 81 mg   Dose: 81 mg  Freq: Daily Route: PO  Start: 05/25/21 0900    Admin Instructions:   Herbal/drug interaction: Avoid use with ginkgo biloba. Do not crush or chew.  Do not exceed 4 grams of aspirin in a 24 hr period.    If given for pain, use the following pain scale:   Mild Pain = Pain Score of 1-3, CPOT 1-2  Moderate Pain = Pain Score of 4-6, CPOT 3-4  Severe Pain = Pain Score of 7-10, CPOT 5-8    2029 2105 2100            carvedilol (COREG) tablet 3.125 mg   Dose: 3.125 mg  Freq: 2 Times Daily With Meals Route: PO  Start: 05/25/21 1800 End: 05/26/21 1415    Admin Instructions:   Give with food.         carvedilol (COREG) tablet 3.125 mg   Dose: 3.125 mg  Freq: Every Morning Route: PO  Start: 05/25/21 0700 End: 05/25/21  1303    Admin Instructions:   Give with food.         carvedilol (COREG) tablet 6.25 mg   Dose: 6.25 mg  Freq: Every Evening Route: PO  Start: 05/24/21 2345 End: 05/25/21 1303    Admin Instructions:   Give with food.         carvedilol (COREG) tablet 6.25 mg   Dose: 6.25 mg  Freq: Every Evening Route: PO  Start: 05/25/21 1700 End: 05/24/21 2259    Admin Instructions:   Give with food.         ceFOXitin (MEFOXIN) 2 g/100 mL NS (MBP)   Dose: 2 g  Freq: On Call to O.R. Route: IV  Indications of Use: PERIOPERATIVE PHARMACOPROPHYLAXIS  Last Dose: Stopped (06/02/21 1325)  Start: 06/02/21 0600 End: 06/02/21 1325    Admin Instructions:   Caution: Look alike/sound alike drug alert Activate vial before using.         diatrizoate meglumine-sodium (GASTROGRAFIN) 66-10 % solution 30 mL   Dose: 30 mL  Freq: Once Route: PO  Start: 05/31/21 1130 End: 05/31/21 1120         digoxin (LANOXIN) injection 0.5 mg   Dose: 0.5 mg  Freq: Once Route: IV  Start: 05/25/21 1530    Admin Instructions:    Check and record heart rate. Use filter needle to withdraw dose from ampule. Dose may be pushed over 5 minutes.         famotidine (PEPCID) injection 20 mg   Dose: 20 mg  Freq: Daily Route: IV  Start: 06/04/21 2000    Admin Instructions:   Dilute to 10 mL total volume and give IV push over 2 minutes.    0858          0949          0836            famotidine (PEPCID) injection 20 mg   Dose: 20 mg  Freq: Once Route: IV  Start: 06/02/21 1226 End: 06/02/21 1238    Admin Instructions:   Dilute to 10 mL total volume and give IV push over 2 minutes.         iopamidol (ISOVUE-300) 61 % injection 100 mL   Dose: 100 mL  Freq: Once in Imaging Route: IV  Start: 05/31/21 1400 End: 05/31/21 1301         iopamidol (ISOVUE-300) 61 % injection 100 mL   Dose: 100 mL  Freq: Once in Imaging Route: IV  Start: 05/24/21 1933 End: 05/1934         iopamidol (ISOVUE-370) 76 % injection 100 mL   Dose: 100 mL  Freq: Once in Imaging Route: IV  Start: 05/28/21 1245 End:  05/28/21 1147         magnesium citrate solution 150 mL   Dose: 150 mL  Freq: Once Route: PO  Start: 05/28/21 0930 End: 05/28/21 1150         metoprolol tartrate (LOPRESSOR) tablet 12.5 mg   Dose: 12.5 mg  Freq: Every 12 Hours Scheduled Route: PO  Start: 05/26/21 1700    0857   2029        0950   2105        0836   2100          metoprolol tartrate (LOPRESSOR) tablet 12.5 mg   Dose: 12.5 mg  Freq: Every 12 Hours Scheduled Route: PO  Start: 05/26/21 2100 End: 05/26/21 1524         morphine injection 2 mg   Dose: 2 mg  Freq: Once Route: IV  Start: 05/24/21 1704 End: 05/24/21 1720    Admin Instructions:   If given for pain, use the following pain scale:  Mild Pain = Pain Score of 1-3, CPOT 1-2  Moderate Pain = Pain Score of 4-6, CPOT 3-4  Severe Pain = Pain Score of 7-10, CPOT 5-8         multivitamin (THERAGRAN) tablet 1 tablet   Dose: 1 tablet  Freq: Daily Route: PO  Start: 05/25/21 0900    Admin Instructions:       0858          0949          0836            ondansetron (ZOFRAN) injection 4 mg   Dose: 4 mg  Freq: Once Route: IV  Start: 05/24/21 1704 End: 05/24/21 1720         PEG-KCl-NaCl-NaSulf-Na Asc-C (MOVIPREP) powder 1,000 mL   Dose: 1,000 mL  Freq: Every 12 Hours Scheduled Route: PO  Start: 05/27/21 1700 End: 05/27/21 2139    Admin Instructions:   Empty packet A and packet B into the disposable container. Add lukewarm drinking water to the top line of the container. Mix to dissolve. Drink 8 ounces every 15 minutes until the entire contents of the container are consumed (approximately one hour). Then drink an additional 16 ounces of clear liquid. Repeat dosing regimen once more as ordered by provider. For inpatient bowel prep: Give first dose 1000mL (one container) 3 1/2 hours before bedtime and 2nd dose 1000 mL (once container) at least 3 1/2 hours prior to procedure. Adjust administration times as indicated.                   piperacillin-tazobactam (ZOSYN) 3.375 g in iso-osmotic dextrose 50 ml (premix)    Dose: 3.375 g  Freq: Every 8 Hours Route: IV  Indications of Use: SEPSIS  Start: 05/25/21 0300 End: 05/31/21 2229    Admin Instructions:   Refrigerate         piperacillin-tazobactam (ZOSYN) 3.375 g in iso-osmotic dextrose 50 ml (premix)   Dose: 3.375 g  Freq: Once Route: IV  Indications of Use: SEPSIS  Last Dose: Stopped (05/24/21 2144)  Start: 05/24/21 2030 End: 05/24/21 2144    Admin Instructions:   Refrigerate         potassium chloride (K-DUR,KLOR-CON) ER tablet 40 mEq   Dose: 40 mEq  Freq: Once Route: PO  Start: 05/28/21 0930 End: 05/28/21 0844    Admin Instructions:   Swallow whole; do not crush, split, or chew.         sodium chloride 0.9 % flush 10 mL   Dose: 10 mL  Freq: Every 12 Hours Scheduled Route: IV  Start: 06/06/21 2100    Admin Instructions:   Lumen #2     2105          0836   2100          sodium chloride 0.9 % flush 10 mL   Dose: 10 mL  Freq: Every 12 Hours Scheduled Route: IV  Start: 06/06/21 2100    Admin Instructions:   Lumen #1     2106          0837   2100          sodium chloride 0.9 % flush 10 mL   Dose: 10 mL  Freq: Every 12 Hours Scheduled Route: IV  Start: 06/06/21 2100 End: 06/06/21 1530    Admin Instructions:   Lumen #2     1530-D/C'd           sodium chloride 0.9 % flush 10 mL   Dose: 10 mL  Freq: Every 12 Hours Scheduled Route: IV  Start: 06/06/21 2100 End: 06/06/21 1530    Admin Instructions:   Lumen #1     1530-D/C'd           sodium chloride 0.9 % flush 10 mL   Dose: 10 mL  Freq: Every 12 Hours Scheduled Route: IV  Start: 05/25/21 0030    0858   2030        0950   2107        0837   2100          sodium chloride 0.9 % flush 3 mL   Dose: 3 mL  Freq: Every 12 Hours Scheduled Route: IV  Start: 06/02/21 1226 End: 06/02/21 1507         Medications 06/05/21 06/06/21 06/07/21       Continuous Meds Sorted by Name  for Jaqueline Rojas as of 6/5/21 through 6/7/21   Legend:                          Inactive     Active     Other Encounter     Linked                 Medications 06/05/21  06/06/21 06/07/21   Adult Central 2-in-1 TPN   Rate: 40 mL/hr Freq: Continuous TPN Route: IV  Start: 06/06/21 1800 End: 06/07/21 1817    Admin Instructions:   TPN #1  Use a 0.22 micron filter.    Order specific questions:   Indication Prolonged Paralytic Ileus  Is patient volume restricted No     1818          1817-D/C'd          dextrose 5 % and sodium chloride 0.45 % with KCl 20 mEq/L infusion   Rate: 100 mL/hr Dose: 100 mL/hr  Freq: Continuous Route: IV  Last Dose: Stopped (06/06/21 1812)  Start: 06/06/21 1400     1603   1812           dextrose 5 % and sodium chloride 0.45 % with KCl 20 mEq/L infusion   Rate: 100 mL/hr Dose: 100 mL/hr  Freq: Continuous Route: IV  Last Dose: 100 mL/hr (06/06/21 0404)  Start: 06/05/21 1515 End: 06/06/21 1314    1434         0404   1314-D/C'd         lactated ringers infusion   Rate: 100 mL/hr Dose: 100 mL/hr  Freq: Continuous Route: IV  Start: 06/02/21 1503 End: 06/03/21 1114         lactated ringers infusion   Rate: 9 mL/hr Dose: 9 mL/hr  Freq: Continuous Route: IV  Last Dose: 9 mL/hr (06/02/21 1238)  Start: 06/02/21 1226 End: 06/02/21 1605         Pharmacy to Dose TPN   Freq: Continuous PRN Route: XX  PRN Reason: Consult  Start: 06/06/21 1313    Order specific questions:   Indication Prolonged Paralytic Ileus  Location of venous access Central  Catheter position confirmed by x-ray No  Is patient volume restricted No         Pharmacy to Dose Zosyn   Freq: Continuous PRN Route: XX  PRN Reason: Consult  Indications of Use: SEPSIS  Start: 05/24/21 2338 End: 05/25/21 0809         propofol (DIPRIVAN) infusion 10 mg/mL 100 mL   Freq: Continuous PRN Route: IV  Last Dose: Stopped (06/02/21 1456)  Start: 06/02/21 1325 End: 06/02/21 1511         sodium chloride 0.9 % infusion   Rate: 100 mL/hr Dose: 100 mL/hr  Freq: Continuous Route: IV  Last Dose: 100 mL/hr (05/29/21 0826)  Start: 05/25/21 0030 End: 05/29/21 1439         sodium chloride 0.9 % with KCl 20 mEq/L infusion   Rate: 75 mL/hr  Dose: 75 mL/hr  Freq: Continuous Route: IV  Last Dose: 75 mL/hr (06/05/21 0909)  Start: 06/02/21 1700 End: 06/05/21 1427    0909   1427-D/C'd          sodium chloride 0.9 % with KCl 20 mEq/L infusion   Rate: 50 mL/hr Dose: 50 mL/hr  Freq: Continuous Route: IV  Last Dose: 50 mL/hr (05/30/21 1223)  Start: 05/29/21 1530 End: 06/02/21 1605         Medications 06/05/21 06/06/21 06/07/21       PRN Meds Sorted by Name  for Jaqueline Rojas as of 6/5/21 through 6/7/21   Legend:                          Inactive     Active     Other Encounter     Linked                 Medications 06/05/21 06/06/21 06/07/21   acetaminophen (TYLENOL) tablet 650 mg   Dose: 650 mg  Freq: Every 4 Hours PRN Route: PO  PRN Reason: Mild Pain   Start: 05/24/21 1097    Admin Instructions:   Do not exceed 4 grams of acetaminophen in a 24 hr period.    If given for pain, use the following pain scale:   Mild Pain = Pain Score of 1-3, CPOT 1-2  Moderate Pain = Pain Score of 4-6, CPOT 3-4  Severe Pain = Pain Score of 7-10, CPOT 5-8  Do not exceed 4 grams of acetaminophen in a 24 hr period. Max dose of 2gm for AST/ALT greater than 120 units/L      If given for pain, use the following pain scale:   Mild Pain = Pain Score of 1-3, CPOT 1-2  Moderate Pain = Pain Score of 4-6, CPOT 3-4  Severe Pain = Pain Score of 7-10, CPOT 5-8    1434           0058            Or  acetaminophen (TYLENOL) 160 MG/5ML solution 650 mg   Dose: 650 mg  Freq: Every 4 Hours PRN Route: PO  PRN Reason: Mild Pain   Start: 05/24/21 7439    Admin Instructions:   Do not exceed 4 grams of acetaminophen in a 24 hr period.    If given for pain, use the following pain scale:   Mild Pain = Pain Score of 1-3, CPOT 1-2  Moderate Pain = Pain Score of 4-6, CPOT 3-4  Severe Pain = Pain Score of 7-10, CPOT 5-8  Do not exceed 4 grams of acetaminophen in a 24 hr period. Max dose of 2gm for AST/ALT greater than 120 units/L      If given for pain, use the following pain scale:   Mild Pain = Pain Score of  1-3, CPOT 1-2  Moderate Pain = Pain Score of 4-6, CPOT 3-4  Severe Pain = Pain Score of 7-10, CPOT 5-8                       Or  acetaminophen (TYLENOL) suppository 650 mg   Dose: 650 mg  Freq: Every 4 Hours PRN Route: RE  PRN Reason: Mild Pain   Start: 05/24/21 0901    Admin Instructions:   Do not exceed 4 grams of acetaminophen in a 24 hr period. Max dose of 2gm for AST/ALT greater than 120 units/L      If given for pain, use the following pain scale:   Mild Pain = Pain Score of 1-3, CPOT 1-2  Moderate Pain = Pain Score of 4-6, CPOT 3-4  Severe Pain = Pain Score of 7-10, CPOT 5-8                       albuterol (PROVENTIL) nebulizer solution 0.083% 2.5 mg/3mL   Dose: 2.5 mg  Freq: Once As Needed Route: NEBULIZATION  PRN Reasons: Wheezing,Shortness of Air  PRN Comment: bronchospasm  Start: 06/02/21 1501 End: 06/02/21 1559         bupivacaine-EPINEPHrine PF (MARCAINE w/EPI) 0.5% [id number] injection   Freq: As Needed  Start: 06/02/21 1337 End: 06/02/21 1500         dexamethasone (DECADRON) injection   Freq: As Needed Route: IV  Start: 06/02/21 1344 End: 06/02/21 1511         diphenhydrAMINE (BENADRYL) capsule 25 mg   Dose: 25 mg  Freq: Every 30 Minutes PRN Route: PO  PRN Reason: Itching  PRN Comment: May repeat x 1  Indications of Use: EXTRAPYRAMIDAL REACTION,PRURITUS  Start: 06/02/21 1501 End: 06/02/21 1559    Admin Instructions:   Caution: Look alike/sound alike drug alert. This med may be ordered in other forms and routes. Before giving verify the last time the drug was given by any route/form.         diphenhydrAMINE (BENADRYL) injection 12.5 mg   Dose: 12.5 mg  Freq: Every 15 Minutes PRN Route: IV  PRN Reason: Itching  PRN Comment: May repeat x 1  Start: 06/02/21 1501 End: 06/02/21 1559    Admin Instructions:   Caution: Look alike/sound alike drug alert. This med may be ordered in other forms and routes. Before giving verify the last time the drug was given by any route/form.         ePHEDrine injection 5 mg    Dose: 5 mg  Freq: Once As Needed Route: IV  PRN Comment: symptomatic hypotension - Notify attending anesthesiologist if this needs to be given  Start: 06/02/21 1501 End: 06/02/21 1559    Admin Instructions:   Caution: Look alike/sound alike drug alert   Dilute with NS to 5-10 mg/mL.  Central line preferred, if unavailable use large bore IV access with frequent nurse monitoring of IV site.         fentaNYL citrate (PF) (SUBLIMAZE) injection   Freq: As Needed Route: IV  Start: 06/02/21 1318 End: 06/02/21 1511         fentaNYL citrate (PF) (SUBLIMAZE) injection 50 mcg   Dose: 50 mcg  Freq: Every 5 Minutes PRN Route: IV  PRN Reasons: Moderate Pain ,Severe Pain   Start: 06/02/21 1501 End: 06/02/21 1559    Admin Instructions:   May alternate fentanyl with hydromorphone using fentanyl first.    Maximum total dose of fentanyl is 200 mcg.  If given for pain, use the following pain scale:  Mild Pain = Pain Score of 1-3, CPOT 1-2  Moderate Pain = Pain Score of 4-6, CPOT 3-4  Severe Pain = Pain Score of 7-10, CPOT 5-8         fentaNYL citrate (PF) (SUBLIMAZE) injection 50 mcg   Dose: 50 mcg  Freq: Every 10 Minutes PRN Route: IV  PRN Reason: Severe Pain   Start: 06/02/21 1224 End: 06/02/21 1507    Admin Instructions:   Maximum total dose of fentanyl is 100 mcg.  If given for pain, use the following pain scale:  Mild Pain = Pain Score of 1-3, CPOT 1-2  Moderate Pain = Pain Score of 4-6, CPOT 3-4  Severe Pain = Pain Score of 7-10, CPOT 5-8         flumazenil (ROMAZICON) injection 0.2 mg   Dose: 0.2 mg  Freq: As Needed Route: IV  PRN Comment: for benzodiazepine induced unresponsiveness or sedation  Indications of Use: BENZODIAZEPINE-INDUCED SEDATION  Start: 06/02/21 1501 End: 06/02/21 1559    Admin Instructions:   Notify Anesthesia if given  ** give IV over 15-30 seconds **         glycopyrrolate (ROBINUL) injection   Freq: As Needed Route: IV  Start: 06/02/21 1455 End: 06/02/21 1511         HYDROcodone-acetaminophen (NORCO)  5-325 MG per tablet 1 tablet   Dose: 1 tablet  Freq: Every 6 Hours PRN Route: PO  PRN Reason: Moderate Pain   Start: 06/03/21 1943 End: 06/10/21 1942    Admin Instructions:   [LICO]    Do not exceed 4 grams of acetaminophen in a 24 hr period. Max dose of 2gm for AST/ALT greater than 120 units/L        If given for pain, use the following pain scale:   Mild Pain = Pain Score of 1-3, CPOT 1-2  Moderate Pain = Pain Score of 4-6, CPOT 3-4  Severe Pain = Pain Score of 7-10, CPOT 5-8         HYDROcodone-acetaminophen (NORCO) 5-325 MG per tablet 1 tablet   Dose: 1 tablet  Freq: Once As Needed Route: PO  PRN Reason: Moderate Pain   Start: 06/02/21 1501 End: 06/02/21 1559    Admin Instructions:   [LICO]    Do not exceed 4 grams of acetaminophen in a 24 hr period. Max dose of 2gm for AST/ALT greater than 120 units/L        If given for pain, use the following pain scale:   Mild Pain = Pain Score of 1-3, CPOT 1-2  Moderate Pain = Pain Score of 4-6, CPOT 3-4  Severe Pain = Pain Score of 7-10, CPOT 5-8         HYDROcodone-acetaminophen (NORCO) 7.5-325 MG per tablet 2 tablet   Dose: 2 tablet  Freq: Every 4 Hours PRN Route: PO  PRN Reason: Severe Pain   Start: 06/02/21 1501 End: 06/02/21 1559    Admin Instructions:   [LICO]    Do not exceed 4 grams of acetaminophen in a 24 hr period. Max dose of 2gm for AST/ALT greater than 120 units/L        If given for pain, use the following pain scale:   Mild Pain = Pain Score of 1-3, CPOT 1-2  Moderate Pain = Pain Score of 4-6, CPOT 3-4  Severe Pain = Pain Score of 7-10, CPOT 5-8         ibuprofen (ADVIL,MOTRIN) tablet 600 mg   Dose: 600 mg  Freq: Once As Needed Route: PO  PRN Reason: Mild Pain   Start: 06/02/21 1501 End: 06/02/21 1559    Admin Instructions:   If given for pain, use the following pain scale:  Mild Pain = Pain Score of 1-3, CPOT 1-2  Moderate Pain = Pain Score of 4-6, CPOT 3-4  Severe Pain = Pain Score of 7-10, CPOT 5-8         ketamine (KETALAR) injection   Freq: As Needed  Route: IV  Start: 06/02/21 1323 End: 06/02/21 1511         labetalol (NORMODYNE,TRANDATE) injection 5 mg   Dose: 5 mg  Freq: Every 5 Minutes PRN Route: IV  PRN Reason: High Blood Pressure  PRN Comment: for systolic blood pressure greater than 180 mmHg or diastolic blood pressure greater than 105 mmHg  Start: 06/02/21 1501 End: 06/02/21 1559    Admin Instructions:   Hold for heart rate less than 60.  Give by slow IV Push each 20mg (or less) over 2 minutes         lidocaine (XYLOCAINE) 2% injection   Freq: As Needed Route: IJ  Start: 06/02/21 1318 End: 06/02/21 1511         lidocaine PF 1% (XYLOCAINE) injection 0.5 mL   Dose: 0.5 mL  Freq: Once As Needed Route: IJ  PRN Comment: IV Start  Start: 06/02/21 1224 End: 06/02/21 1507         midazolam (VERSED) injection 0.5 mg   Dose: 0.5 mg  Freq: Every 10 Minutes PRN Route: IV  PRN Comment: Anxiety prophylaxis, Pre-op comfort  Start: 06/02/21 1224 End: 06/02/21 1507    Admin Instructions:   May repeat dose in 10 minutes one time then contact provider for additional orders.           morphine injection 2 mg   Dose: 2 mg  Freq: Every 4 Hours PRN Route: IV  PRN Reason: Severe Pain   Start: 05/25/21 0404 End: 06/02/21 1605    Admin Instructions:   If given for pain, use the following pain scale:  Mild Pain = Pain Score of 1-3, CPOT 1-2  Moderate Pain = Pain Score of 4-6, CPOT 3-4  Severe Pain = Pain Score of 7-10, CPOT 5-8         morphine injection 4 mg   Dose: 4 mg  Freq: Every 4 Hours PRN Route: IV  PRN Reason: Severe Pain   Start: 06/03/21 1943 End: 06/08/21 1425    Admin Instructions:   If given for pain, use the following pain scale:  Mild Pain = Pain Score of 1-3, CPOT 1-2  Moderate Pain = Pain Score of 4-6, CPOT 3-4  Severe Pain = Pain Score of 7-10, CPOT 5-8         morphine injection 4 mg   Dose: 4 mg  Freq: Every 4 Hours PRN Route: IV  PRN Reason: Severe Pain   Start: 06/02/21 1605 End: 06/03/21 1215    Admin Instructions:   If given for pain, use the following pain  scale:  Mild Pain = Pain Score of 1-3, CPOT 1-2  Moderate Pain = Pain Score of 4-6, CPOT 3-4  Severe Pain = Pain Score of 7-10, CPOT 5-8         naloxone (NARCAN) injection 0.2 mg   Dose: 0.2 mg  Freq: As Needed Route: IV  PRN Reasons: Opioid Reversal,Respiratory Depression  PRN Comment: unresponsiveness, decrease oxygen saturation  Indications of Use: ACUTE RESPIRATORY FAILURE,OPIOID-INDUCED RESPIRATORY DEPRESSION  Start: 06/02/21 1501 End: 06/02/21 1559    Admin Instructions:   Notify Anesthesia if given         neostigmine (PROSTIGMINE) injection solution   Freq: As Needed Route: IV  Start: 06/02/21 1455 End: 06/02/21 1511         nitroglycerin (NITROSTAT) SL tablet 0.4 mg   Dose: 0.4 mg  Freq: Every 5 Minutes PRN Route: SL  PRN Reason: Chest Pain  PRN Comment: Only if SBP Greater Than 100  Start: 05/24/21 2339    Admin Instructions:   If Pain Unrelieved After 3 Doses Notify MD         ondansetron (ZOFRAN) injection   Freq: As Needed Route: IV  Start: 06/02/21 1455 End: 06/02/21 1511         ondansetron (ZOFRAN) injection 4 mg   Dose: 4 mg  Freq: Once As Needed Route: IV  PRN Reasons: Nausea,Vomiting  Indications of Use: POSTOPERATIVE NAUSEA AND VOMITING  Start: 06/02/21 1501 End: 06/02/21 1559    Admin Instructions:   If BOTH ondansetron (ZOFRAN) and promethazine (PHENERGAN) are ordered use ondansetron first and THEN promethazine IF ondansetron is ineffective.         ondansetron (ZOFRAN) injection 4 mg   Dose: 4 mg  Freq: Every 6 Hours PRN Route: IV  PRN Reasons: Nausea,Vomiting  Start: 05/24/21 2342    Admin Instructions:   If BOTH ondansetron (ZOFRAN) and promethazine (PHENERGAN) are ordered use ondansetron first and THEN promethazine IF ondansetron is ineffective.         Pharmacy to Dose TPN   Freq: Continuous PRN Route: XX  PRN Reason: Consult  Start: 06/06/21 1313    Order specific questions:   Indication Prolonged Paralytic Ileus  Location of venous access Central  Catheter position confirmed by x-ray  No  Is patient volume restricted No         Pharmacy to Dose Zosyn   Freq: Continuous PRN Route: XX  PRN Reason: Consult  Indications of Use: SEPSIS  Start: 05/24/21 2338 End: 05/25/21 0809         potassium chloride (K-DUR,KLOR-CON) ER tablet 40 mEq   Dose: 40 mEq  Freq: As Needed Route: PO  PRN Comment: Potassium Replacement.  See Admin Instructions  Start: 05/28/21 1333    Admin Instructions:   Potassium 3.1 or Less Give KCl 40 mEq q4h x3 Doses   Potassium 3.2 - 3.6 Give KCl 40 mEq q4h x2 Doses     Check Potassium 4 Hours After Last Dose Given   Check Magnesium if Potassium Level Remains Low After Replacement   DO NOT GIVE if CrCl is Less Than 30 mL/minute or Urine Output Less Than 30 mL/hr  Swallow whole; do not crush, split, or chew.         Or  potassium chloride (KLOR-CON) packet 40 mEq   Dose: 40 mEq  Freq: As Needed Route: PO  PRN Comment: potassium replacement, see admin instructions  Start: 05/28/21 1333    Admin Instructions:   Potassium 3.1 or Less Give KCl 40 mEq q4h x3 Doses   Potassium 3.2 - 3.6 Give KCl 40 mEq q4h x2 Doses     Check Potassium 4 Hours After Last Dose Given   Check Magnesium if Potassium Level Remains Low After Replacement   DO NOT GIVE if CrCl is Less Than 30 mL/minute or Urine Output Less Than 30 mL/hr         Or  potassium chloride 10 mEq in 100 mL IVPB   Dose: 10 mEq  Freq: Every 1 Hour PRN Route: IV  PRN Comment: Potassium Replacement - See Admin Instructions  Start: 05/28/21 1333    Admin Instructions:   Peripheral or Central IV  Potassium 3.1 or Less Give KCl 10 mEq/100 mL NS IV q1h x6 Doses  Potassium 3.2 - 3.6 Give KCl 10 mEq/100 mL NS q1h x4 Doses    Check Potassium 4 Hours After Last Dose Given  Check Magnesium if Potassium Remains Low After Replacement  DO NOT GIVE if CrCl is Less Than 30 mL/minute or Urine Output Less Than 30 mL/hr.     Rates Greater Than 10 mEq/hr Require ECG Monitoring.  OUTPATIENT/NON-MONITORED UNITS: Potassium Chloride standard bolus infusion rate is  a maximum of 10 mEq/hr on unmonitored patients    MONITORED UNITS: Potassium Chloride standard bolus infusion rate is a maximum of 20 mEq/hr on ECG monitored patients ONLY         potassium phosphate 45 mmol in sodium chloride 0.9 % 500 mL infusion   Dose: 45 mmol  Freq: As Needed Route: IV  PRN Comment: Peripheral IV - Phosphorus Less Than 1.3 mg/dL  Start: 06/06/21 0839    Admin Instructions:   Recheck Phosphorus level 6 hours after replacement complete.  Refrigerate.     Doses listed as mmol of phosphate.   4.4 mEq of Potassium = 3 mmol of Phosphate                Or  potassium phosphate 30 mmol in sodium chloride 0.9 % 250 mL infusion   Dose: 30 mmol  Freq: As Needed Route: IV  PRN Comment: Peripheral IV - Phosphorus 1.3 - 1.7 mg/dL  Start: 06/06/21 0839    Admin Instructions:   Recheck Phosphorus level 6 hours after replacement complete.  Refrigerate.     Doses listed as mmol of phosphate.   4.4 mEq of Potassium = 3 mmol of Phosphate                Or  potassium phosphate 15 mmol in sodium chloride 0.9 % 100 mL infusion   Dose: 15 mmol  Freq: As Needed Route: IV  PRN Comment: Peripheral IV - Phosphorus 1.8 - 2.5 mg/dL  Start: 06/06/21 0839    Admin Instructions:   Recheck Phosphorus level 6 hours after replacement complete.  Refrigerate.     Doses listed as mmol of phosphate.   4.4 mEq of Potassium = 3 mmol of Phosphate                Or  sodium phosphates 40 mmol in sodium chloride 0.9 % 500 mL IVPB   Dose: 40 mmol  Freq: As Needed Route: IV  PRN Comment: Peripheral IV - Phosphorus Less Than 1.3 mg/dL & Potassium Greater Than 4  Start: 06/06/21 0839    Admin Instructions:   Recheck Phosphorus level 6 hours after replacement.                Or  sodium phosphates 20 mmol in sodium chloride 0.9 % 250 mL IVPB   Dose: 20 mmol  Freq: As Needed Route: IV  PRN Comment: Peripheral IV - Phosphorus 1.3 - 2.5 & Potassium Greater Than 4  Start: 06/06/21 0839    Admin Instructions:   Recheck Phosphorus level 6 hours after  replacement complete     1317             promethazine (PHENERGAN) suppository 25 mg   Dose: 25 mg  Freq: Once As Needed Route: RE  PRN Reasons: Nausea,Vomiting  Start: 06/02/21 1501 End: 06/02/21 1559    Admin Instructions:   If BOTH ondansetron (ZOFRAN) and promethazine (PHENERGAN) are ordered use ondansetron first and THEN promethazine IF ondansetron is ineffective.         Or  promethazine (PHENERGAN) tablet 25 mg   Dose: 25 mg  Freq: Once As Needed Route: PO  PRN Reasons: Nausea,Vomiting  Start: 06/02/21 1501 End: 06/02/21 1559    Admin Instructions:   If BOTH ondansetron (ZOFRAN) and promethazine (PHENERGAN) are ordered use ondansetron first and THEN promethazine IF ondansetron is ineffective.           propofol (DIPRIVAN) infusion 10 mg/mL 100 mL   Freq: Continuous PRN Route: IV  Start: 06/02/21 1325 End: 06/02/21 1511         Propofol (DIPRIVAN) injection   Freq: As Needed Route: IV  Start: 06/02/21 1318 End: 06/02/21 1511         rocuronium (ZEMURON) injection   Freq: As Needed Route: IV  Start: 06/02/21 1318 End: 06/02/21 1511         sodium chloride (NS) irrigation solution   Freq: As Needed  Start: 06/02/21 1337 End: 06/02/21 1500         sodium chloride 0.9 % flush 10 mL   Dose: 10 mL  Freq: As Needed Route: IV  PRN Reason: Line Care  PRN Comment: After Medication Administration  Start: 06/06/21 1754         sodium chloride 0.9 % flush 10 mL   Dose: 10 mL  Freq: As Needed Route: IV  PRN Reason: Line Care  PRN Comment: After Medication Administration  Start: 06/06/21 1511 End: 06/06/21 1530     1530-D/C'd           sodium chloride 0.9 % flush 10 mL   Dose: 10 mL  Freq: As Needed Route: IV  PRN Reason: Line Care  Start: 05/24/21 2339         sodium chloride 0.9 % flush 10 mL   Dose: 10 mL  Freq: As Needed Route: IV  PRN Reason: Line Care  Start: 05/24/21 1606         sodium chloride 0.9 % flush 20 mL   Dose: 20 mL  Freq: As Needed Route: IV  PRN Reason: Line Care  PRN Comment: After Blood Draws or Blood  Product Administration  Start: 06/06/21 1754         sodium chloride 0.9 % flush 3-10 mL   Dose: 3-10 mL  Freq: As Needed Route: IV  PRN Reason: Line Care  Start: 06/02/21 1224 End: 06/02/21 1507         Medications 06/05/21 06/06/21 06/07/21

## 2021-06-07 NOTE — THERAPY DISCHARGE NOTE
Patient Name: Jaqueline Rojas  : 1924    MRN: [id number]                              Today's Date: 2021       Admit Date: 2021    Visit Dx:     ICD-10-CM ICD-9-CM   1. Colitis  K52.9 558.9   2. Abdominal pain, unspecified abdominal location  R10.9 789.00   3. Iron deficiency anemia due to chronic blood loss  D50.0 280.0   4. Cecum mass  K63.89 569.89     Patient Active Problem List   Diagnosis   • Irregular heart rate   • Shortness of breath on exertion   • Supraventricular tachycardia (CMS/HCC)   • PVC (premature ventricular contraction)   • PSVT (paroxysmal supraventricular tachycardia) (CMS/HCC)   • Palpitations   • Ischemic colitis (CMS/HCC)   • Mitral valve regurgitation   • Leukocytosis   • Hyperglycemia   • Anemia   • Superior mesenteric artery stenosis (CMS/HCC)   • Iron deficiency anemia due to chronic blood loss   • Cecum mass   • Hyponatremia     Past Medical History:   Diagnosis Date   • Afib (CMS/HCC)    • CANCINO (dyspnea on exertion)    • Frequent PVCs     multifocal   • Hypotension    • Irregular heart beat    • Knee injury    • Mitral regurgitation     Moderate to Severe per Echocardiogram 2016   • Palpitations    • PONV (postoperative nausea and vomiting)    • Supraventricular tachycardia (CMS/HCC)    • Wrist fracture      Past Surgical History:   Procedure Laterality Date   • COLON RESECTION N/A 2021    Procedure: COLON RESECTION RIGHT;  Surgeon: Baldemar Griffin Jr., MD;  Location: [facility];  Service: General;  Laterality: N/A;   • MASTOID SURGERY     • SKIN GRAFT     • TONSILLECTOMY     • WRIST SURGERY       General Information     Row Name 21 1128          Physical Therapy Time and Intention    Document Type  discharge treatment;therapy note (daily note)  -EJ     Mode of Treatment  physical therapy  -EJ       User Key  (r) = Recorded By, (t) = Taken By, (c) = Cosigned By    Initials Name Provider Type    Ivy Gallo, PT Physical Therapist        Mobility      Row Name 06/07/21 1128          Bed Mobility    Supine-Sit Watson (Bed Mobility)  standby assist  -EJ     Sit-Supine Watson (Bed Mobility)  standby assist  -EJ     Assistive Device (Bed Mobility)  bed rails;head of bed elevated  -EJ     Row Name 06/07/21 1128          Sit-Stand Transfer    Sit-Stand Watson (Transfers)  standby assist;supervision  -EJ     Assistive Device (Sit-Stand Transfers)  walker, front-wheeled  -EJ     Row Name 06/07/21 1128          Gait/Stairs (Locomotion)    Watson Level (Gait)  verbal cues;standby assist  -EJ     Assistive Device (Gait)  walker, front-wheeled  -EJ     Distance in Feet (Gait)  300  -EJ     Bilateral Gait Deviations  forward flexed posture  -EJ     Comment (Gait/Stairs)  no unsteadiness or LOB  -EJ       User Key  (r) = Recorded By, (t) = Taken By, (c) = Cosigned By    Initials Name Provider Type    Ivy Gallo, PT Physical Therapist        Obj/Interventions    No documentation.       Goals/Plan    No documentation.       Clinical Impression     Row Name 06/07/21 1130          Plan of Care Review    Plan of Care Reviewed With  patient  -EJ     Progress  improving  -EJ     Outcome Summary  Pt seen for PT this am. She is doing well at this time and improving with mobility. Pt states she has been ambulating everyday without difficulty. Today, pt able to ambulate 300 ft with Rwx and SBA. No unsteadiness noted. Pt plans home with her daughter. Encouraged pt to continue ambulating with nsg. No further acute PT needs at this time. Will sign off.  -EJ     Row Name 06/07/21 1130          Positioning and Restraints    Pre-Treatment Position  in bed  -EJ     Post Treatment Position  bed  -EJ     In Bed  notified nsg;supine;call light within reach;encouraged to call for assist;with family/caregiver  -EJ       User Key  (r) = Recorded By, (t) = Taken By, (c) = Cosigned By    Initials Name Provider Type    Ivy Gallo, PT Physical Therapist         Outcome Measures     Row Name 06/07/21 1136          How much help from another person do you currently need...    Turning from your back to your side while in flat bed without using bedrails?  3  -EJ     Moving from lying on back to sitting on the side of a flat bed without bedrails?  3  -EJ     Moving to and from a bed to a chair (including a wheelchair)?  3  -EJ     Standing up from a chair using your arms (e.g., wheelchair, bedside chair)?  3  -EJ     Climbing 3-5 steps with a railing?  3  -EJ     To walk in hospital room?  3  -EJ     AM-PAC 6 Clicks Score (PT)  18  -EJ       User Key  (r) = Recorded By, (t) = Taken By, (c) = Cosigned By    Initials Name Provider Type     Ivy Rider, PT Physical Therapist        Physical Therapy Education                 Title: PT OT SLP Therapies (Resolved)     Topic: Physical Therapy (Resolved)     Point: Mobility training (Resolved)     Learning Progress Summary           Patient Acceptance, E,TB,D, VU,NR by  at 6/4/2021 1502   Family Acceptance, E,TB,D, VU,NR by  at 6/4/2021 1502                   Point: Home exercise program (Resolved)     Learning Progress Summary           Patient Acceptance, E,TB,D, VU,NR by  at 6/4/2021 1502   Family Acceptance, E,TB,D, VU,NR by  at 6/4/2021 1502                   Point: Body mechanics (Resolved)     Learning Progress Summary           Patient Acceptance, E,TB,D, VU,NR by  at 6/4/2021 1502   Family Acceptance, E,TB,D, VU,NR by  at 6/4/2021 1502                   Point: Precautions (Resolved)     Learning Progress Summary           Patient Acceptance, E,TB,D, VU,NR by  at 6/4/2021 1502   Family Acceptance, E,TB,D, VU,NR by  at 6/4/2021 1502                               User Key     Initials Effective Dates Name Provider Type [facility] 05/10/21 -  Marti Dickens Physical Therapist PT              PT Recommendation and Plan     Plan of Care Reviewed With: patient  Progress: improving  Outcome Summary:  Pt seen for PT this am. She is doing well at this time and improving with mobility. Pt states she has been ambulating everyday without difficulty. Today, pt able to ambulate 300 ft with Rwx and SBA. No unsteadiness noted. Pt plans home with her daughter. Encouraged pt to continue ambulating with nsg. No further acute PT needs at this time. Will sign off.     Time Calculation:   PT Charges     Row Name 06/07/21 1137             Time Calculation    Start Time  1115  -EJ      Stop Time  1125  -EJ      Time Calculation (min)  10 min  -EJ      PT Received On  06/07/21  -EJ        User Key  (r) = Recorded By, (t) = Taken By, (c) = Cosigned By    Initials Name Provider Type    EJ Ivy Rider, PT Physical Therapist        Therapy Charges for Today     Code Description Service Date Service Provider Modifiers Qty    [id number] HC PT THER PROC EA 15 MIN 6/7/2021 Ivy Rider, PT GP 1          PT G-Codes  Outcome Measure Options: AM-PAC 6 Clicks Basic Mobility (PT)  AM-PAC 6 Clicks Score (PT): 18         Ivy Rider PT  6/7/2021

## 2021-06-08 LAB
ALBUMIN SERPL-MCNC: 2.7 G/DL (ref 3.5–5.2)
ALBUMIN/GLOB SERPL: 1 G/DL
ALP SERPL-CCNC: 44 U/L (ref 39–117)
ALT SERPL W P-5'-P-CCNC: 9 U/L (ref 1–33)
ANION GAP SERPL CALCULATED.3IONS-SCNC: 4.8 MMOL/L (ref 5–15)
AST SERPL-CCNC: 13 U/L (ref 1–32)
BASOPHILS # BLD AUTO: 0.03 10*3/MM3 (ref 0–0.2)
BASOPHILS NFR BLD AUTO: 0.4 % (ref 0–1.5)
BILIRUB SERPL-MCNC: 0.3 MG/DL (ref 0–1.2)
BUN SERPL-MCNC: 11 MG/DL (ref 8–23)
BUN/CREAT SERPL: 28.9 (ref 7–25)
CALCIUM SPEC-SCNC: 8.3 MG/DL (ref 8.2–9.6)
CHLORIDE SERPL-SCNC: 102 MMOL/L (ref 98–107)
CO2 SERPL-SCNC: 26.2 MMOL/L (ref 22–29)
CREAT SERPL-MCNC: 0.38 MG/DL (ref 0.57–1)
DEPRECATED RDW RBC AUTO: 41.2 FL (ref 37–54)
EOSINOPHIL # BLD AUTO: 0.13 10*3/MM3 (ref 0–0.4)
EOSINOPHIL NFR BLD AUTO: 1.8 % (ref 0.3–6.2)
ERYTHROCYTE [DISTWIDTH] IN BLOOD BY AUTOMATED COUNT: 13.6 % (ref 12.3–15.4)
GFR SERPL CREATININE-BSD FRML MDRD: >150 ML/MIN/1.73
GLOBULIN UR ELPH-MCNC: 2.8 GM/DL
GLUCOSE SERPL-MCNC: 174 MG/DL (ref 65–99)
HCT VFR BLD AUTO: 29.9 % (ref 34–46.6)
HGB BLD-MCNC: 10 G/DL (ref 12–15.9)
IMM GRANULOCYTES # BLD AUTO: 0.04 10*3/MM3 (ref 0–0.05)
IMM GRANULOCYTES NFR BLD AUTO: 0.6 % (ref 0–0.5)
LYMPHOCYTES # BLD AUTO: 0.89 10*3/MM3 (ref 0.7–3.1)
LYMPHOCYTES NFR BLD AUTO: 12.5 % (ref 19.6–45.3)
MAGNESIUM SERPL-MCNC: 2 MG/DL (ref 1.7–2.3)
MCH RBC QN AUTO: 28.1 PG (ref 26.6–33)
MCHC RBC AUTO-ENTMCNC: 33.4 G/DL (ref 31.5–35.7)
MCV RBC AUTO: 84 FL (ref 79–97)
MONOCYTES # BLD AUTO: 1.19 10*3/MM3 (ref 0.1–0.9)
MONOCYTES NFR BLD AUTO: 16.7 % (ref 5–12)
NEUTROPHILS NFR BLD AUTO: 4.84 10*3/MM3 (ref 1.7–7)
NEUTROPHILS NFR BLD AUTO: 68 % (ref 42.7–76)
NRBC BLD AUTO-RTO: 0 /100 WBC (ref 0–0.2)
PHOSPHATE SERPL-MCNC: 2.3 MG/DL (ref 2.5–4.5)
PLATELET # BLD AUTO: 351 10*3/MM3 (ref 140–450)
PMV BLD AUTO: 8.7 FL (ref 6–12)
POTASSIUM SERPL-SCNC: 4.4 MMOL/L (ref 3.5–5.2)
PROT SERPL-MCNC: 5.5 G/DL (ref 6–8.5)
RBC # BLD AUTO: 3.56 10*6/MM3 (ref 3.77–5.28)
SODIUM SERPL-SCNC: 133 MMOL/L (ref 136–145)
WBC # BLD AUTO: 7.12 10*3/MM3 (ref 3.4–10.8)

## 2021-06-08 PROCEDURE — 25010000002 POTASSIUM CHLORIDE PER 2 MEQ OF POTASSIUM: Performed by: SURGERY

## 2021-06-08 PROCEDURE — 83735 ASSAY OF MAGNESIUM: CPT | Performed by: SURGERY

## 2021-06-08 PROCEDURE — 84100 ASSAY OF PHOSPHORUS: CPT | Performed by: SURGERY

## 2021-06-08 PROCEDURE — 80053 COMPREHEN METABOLIC PANEL: CPT | Performed by: SURGERY

## 2021-06-08 PROCEDURE — 25010000002 MAGNESIUM SULFATE PER 500 MG OF MAGNESIUM: Performed by: SURGERY

## 2021-06-08 PROCEDURE — 25010000002 CALCIUM GLUCONATE PER 10 ML: Performed by: SURGERY

## 2021-06-08 PROCEDURE — 99024 POSTOP FOLLOW-UP VISIT: CPT | Performed by: SURGERY

## 2021-06-08 PROCEDURE — 85025 COMPLETE CBC W/AUTO DIFF WBC: CPT | Performed by: INTERNAL MEDICINE

## 2021-06-08 RX ADMIN — SODIUM CHLORIDE, PRESERVATIVE FREE 10 ML: 5 INJECTION INTRAVENOUS at 09:44

## 2021-06-08 RX ADMIN — CALCIUM GLUCONATE: 98 INJECTION, SOLUTION INTRAVENOUS at 18:12

## 2021-06-08 RX ADMIN — SODIUM CHLORIDE, PRESERVATIVE FREE 10 ML: 5 INJECTION INTRAVENOUS at 20:52

## 2021-06-08 RX ADMIN — ASPIRIN 81 MG: 81 TABLET, COATED ORAL at 20:52

## 2021-06-08 RX ADMIN — ACETAMINOPHEN 650 MG: 325 TABLET, FILM COATED ORAL at 16:14

## 2021-06-08 RX ADMIN — ACETAMINOPHEN 650 MG: 325 TABLET, FILM COATED ORAL at 00:54

## 2021-06-08 RX ADMIN — METOPROLOL TARTRATE 12.5 MG: 25 TABLET, FILM COATED ORAL at 20:52

## 2021-06-08 RX ADMIN — FAMOTIDINE 20 MG: 10 INJECTION INTRAVENOUS at 09:43

## 2021-06-08 RX ADMIN — SODIUM CHLORIDE, PRESERVATIVE FREE 10 ML: 5 INJECTION INTRAVENOUS at 20:53

## 2021-06-08 RX ADMIN — METOPROLOL TARTRATE 12.5 MG: 25 TABLET, FILM COATED ORAL at 09:43

## 2021-06-08 RX ADMIN — SODIUM CHLORIDE, PRESERVATIVE FREE 10 ML: 5 INJECTION INTRAVENOUS at 09:43

## 2021-06-08 RX ADMIN — Medication 1 TABLET: at 09:43

## 2021-06-08 RX ADMIN — SODIUM PHOSPHATE, MONOBASIC, MONOHYDRATE 20 MMOL: 276; 142 INJECTION, SOLUTION INTRAVENOUS at 16:10

## 2021-06-08 NOTE — PROGRESS NOTES
"Pharmacy to dose TPN - Daily Progress Note  Patient: Jaqueline Rojas  MRN#: [id number]  Admission Date: 524    TPN # 3 per Dr. Paez  Indication:Prolonged Paralytic Ileus  S/P Right hemicolectomy, POD 6    Principal Problem:    Cecum mass  Active Problems:    PSVT (paroxysmal supraventricular tachycardia) (CMS/HCC)    Ischemic colitis (CMS/HCC)    Mitral valve regurgitation    Leukocytosis    Hyperglycemia    Anemia    Superior mesenteric artery stenosis (CMS/HCC)    Iron deficiency anemia due to chronic blood loss    Hyponatremia    Subjective/Objective  89 y.o. female 162.6 cm (64.02\") 54.9 kg (121 lb)  Results from last 7 days   Lab Units 21  0635 21  0701   SODIUM mmol/L 133* 135*   POTASSIUM mmol/L 4.4 3.9   CHLORIDE mmol/L 102 101   CO2 mmol/L 26.2 26.5   BUN mg/dL 11 7*   CREATININE mg/dL 0.38* 0.46*   CALCIUM mg/dL 8.3 8.1*   ALBUMIN g/dL 2.70* 2.80*   BILIRUBIN mg/dL 0.3 0.3   ALK PHOS U/L 44 43   ALT (SGPT) U/L 9 11   AST (SGOT) U/L 13 13   GLUCOSE mg/dL 174* 169*   MAGNESIUM mg/dL 2.0 1.9   PHOSPHORUS mg/dL 2.3* 2.7   TRIGLYCERIDES mg/dL  --  96    Corrected calcium for albumin = 9.34  I/O last 3 completed shifts:  In: 210 [P.O.:210]  Out: 2450 [Urine:2450]    Diet Orders (active) (From admission, onward)     Start     Ordered    21 0926  Diet Full Liquid  Diet Effective Now      21 0925              Additional insulin administration while previous TPN infusin units  Additional electrolyte administration while previous TPN infusing: none  Acid suppression: Famotidine 20mg IV q24h    Dietitian recommendations ()  Till po established, rec 1200 dextrose, 65gm AA, and 20% 100ml lipids.    Daily Assessment  Current macronutrients: protein 65 gm/day, dextrose 900 kcal/day, lipids 200 kcal MWF utilizing SMOFlipids  Fluid rate: 60 mL/hr    Plan    · Charted as 0% breakfast, 75% L, and 50% D   · Stool documented  afternoon x1                Protein:  65 grams/day - keep " per RD recommendation              Dextrose: 1000 Kcal/day (increased from 900 Kcal/day) - slowly increasing towards goal to reduce risk of refeeding syndrome              Lipids: SMOF Lipids 20% 100 mL M,W,F              Volume: 1560 ml (60 ml/hr over 24 hrs daily)  Based on the above labs and current dietary intake, will add the following electrolytes/additives to the TPN.               Sodium Chloride:90 mEq (increased from 80)               Sodium Phosphate: 15 mEq (increased from 0)              Potassium Chloride: 20 mEq (decreased from 50 mEq)              Potassium Phosphate: 22mEq              Calcium Gluconate: 9 mEq              Magnesium Sulfate: 10 mEq              Multivitamin: none - getting PO MVI                   Trace Elements: 1mL     Labs to be ordered: BMP, Mag, and Phos     Pharmacy will continue to follow.   Thanks, Sandro Cheema, PharmD, BCPS

## 2021-06-08 NOTE — PROGRESS NOTES
Adult Nutrition  Assessment/PES    Patient Name:  Jaqueline Rojas  YOB: 1924  MRN: [id number]  Admit Date:  5/24/2021    Assessment Date:  6/8/2021    Comments:  Follow up note. Pt on Full liquid diet with 25-50% intake. Visited pt at breakfast to encourage po. Pt states she did not tolerate the tomato soup yesterday. TPN in progress with 900 dex, 65 gm AA,  with new orders noted for 1000 dex and lipids. Will continue to work with pt, offer boost plus supplement, and hope to advance diet as tolerated soon.    Reason for Assessment     Row Name 06/08/21 0949          Reason for Assessment    Reason For Assessment  follow-up protocol;TF/PN         Nutrition/Diet History     Row Name 06/08/21 0949          Nutrition/Diet History    Typical Food/Fluid Intake  po 25-20%, said she did not tolerate tomato soup           Labs/Tests/Procedures/Meds     Row Name 06/08/21 0950          Labs/Procedures/Meds    Lab Results Reviewed  reviewed     Lab Results Comments  na, glu, h/h, alb        Diagnostic Tests/Procedures    Diagnostic Test/Procedure Reviewed  reviewed        Medications    Pertinent Medications Reviewed  reviewed     Pertinent Medications Comments  TPN at 60ml/hr         Physical Findings     Row Name 06/08/21 0951          Physical Findings    Skin  surgical incision           Nutrition Prescription Ordered     Row Name 06/08/21 0952          Nutrition Prescription PO    Current PO Diet  Full Liquid        Nutrition Prescription PN    PN Current Rate (mL/hr)  60 mL/hr     Dextrose (Kcal)  900     Amino Acid (gm)  65     Lipid Concentration (%)  20%     Lipid Volume (mL)  100 mL         Evaluation of Received Nutrient/Fluid Intake     Row Name 06/08/21 1000          Calories Evaluation    Parenteral Calories (kcal)  1360     % of Kcal Needs  82        Protein Evaluation    Parenteral Protein (gm)  65     % of Protein Needs  100        Fluid Intake Evaluation    Parenteral Fluid (mL)  1440                Problem/Interventions:          Intervention Goal     Row Name 06/08/21 1000          Intervention Goal    General  Maintain nutrition;Nutrition support treatment;Improved nutrition related lab(s);Reduce/improve symptoms;Meet nutritional needs for age/condition;Disease management/therapy     PO  Establish PO;Advance diet;Tolerate PO;PO intake (%)     PO Intake %  75 %     TF/PN  Adjust TF/PN     Weight  Maintain weight         Nutrition Intervention     Row Name 06/08/21 1001          Nutrition Intervention    RD/Tech Action  Follow Tx progress;Care plan reviewd;Interview for preference;Encourage intake         Nutrition Prescription     Row Name 06/08/21 1001          Nutrition Prescription PO    PO Prescription  Begin/change supplement     Supplement  Boost Plus        Nutrition Prescription PN    Parenteral Prescription  Continue same protocal         Education/Evaluation     Row Name 06/08/21 1001          Education    Education  Will Instruct as appropriate        Monitor/Evaluation    Monitor  Per protocol;I&O;Pertinent labs;PN delivery/tolerance;Weight;Skin status;Symptoms           Electronically signed by:  Yasmeen Wiley RD  06/08/21 10:02 EDT

## 2021-06-08 NOTE — PLAN OF CARE
Goal Outcome Evaluation:  Plan of Care Reviewed With: patient        Progress: no change  Outcome Summary: Patient had some c/o pain tonight but only wanted to take Tylenol. Up with stand-by assist to BSC. Purewick placed back on this evening per patient request. Midline incision clean, dry, and intact. Has not had much intake this evening, encouraging fluids. TPN infusion in [facility] PICC. VSS. Will continue to monitor closely.

## 2021-06-08 NOTE — PLAN OF CARE
Goal Outcome Evaluation:  Plan of Care Reviewed With: patient, daughter        Progress: no change  Outcome Summary: pt received from 6e s/p hemicolectomy for cecum mass. TPN with lipids. pt to ambulate 4x day, ambulated twice while up here. Pt did ultimately have BM today, abd distended. replacing phos. VSS will continue to monitor

## 2021-06-08 NOTE — PROGRESS NOTES
Chief Complaint:    S/P Right hemicolectomy, POD 6    Subjective:    The patient is generally feeling well but she has a poor appetite.  She is not having any nausea or vomiting.  She continues to pass flatus but no bowel movement.    Objective:    Temp:  [97.7 °F (36.5 °C)-98.7 °F (37.1 °C)] 98.1 °F (36.7 °C)  Heart Rate:  [80-90] 86  Resp:  [16-18] 16  BP: (130-170)/() 159/76    Physical Exam  Constitutional:       Appearance: She is not ill-appearing or toxic-appearing.   Abdominal:      General: Bowel sounds are decreased. There is distension.      Palpations: Abdomen is soft.      Tenderness: There is no abdominal tenderness.   Neurological:      Mental Status: She is alert.   Psychiatric:         Behavior: Behavior is cooperative.         Results:    WBC is 7.12 with a normal differential.  H/H is 10.0/29.9.    Impression/Plan:    The patient is POD 6 from a right hemicolectomy for cecal cancer.  She continues to have an expected postop ileus that is slowly resolving.  She is not ready to have her diet advanced and she needs to be continued on TPN.    We will await return of bowel function.  If she continues to have significant abdominal distention, abdominal x-rays will be checked.    Baldemar Griffin Jr., M.D.

## 2021-06-08 NOTE — PROGRESS NOTES
[phone]   LOS: 15 days     Name: Jaqueline Rojas  Age/Sex: 89 y.o. female  :  1924        PCP: Provider, No Known  Chief Complaint   Patient presents with   • Abdominal Pain      Subjective   She denies any nausea or vomiting but remains distended and bloated.  Abdominal pain seems to be improving and she has had a small bowel movement with flatus today.  Noted early satiety  General: No Fever or Chills, Cardiac: No Chest Pain or Palpitations, Resp: No Cough or SOA, GI: No Nausea, Vomiting, or Diarrhea and Other: No bleeding    aspirin, 81 mg, Oral, Daily  digoxin, 0.5 mg, Intravenous, Once  famotidine, 20 mg, Intravenous, Daily  Fat Emul Fish Oil/Plant Based, 100 mL, Intravenous, Once per day on   metoprolol tartrate, 12.5 mg, Oral, Q12H  multivitamin, 1 tablet, Oral, Daily  sodium chloride, 10 mL, Intravenous, Q12H  sodium chloride, 10 mL, Intravenous, Q12H  sodium chloride, 10 mL, Intravenous, Q12H      Adult Central 2-in-1 TPN, , Last Rate: 60 mL/hr at 21  dextrose 5 % and sodium chloride 0.45 % with KCl 20 mEq/L, 100 mL/hr, Last Rate: Stopped (21)  Pharmacy to Dose TPN,         Objective   Vital Signs  Temp:  [97.7 °F (36.5 °C)-98.7 °F (37.1 °C)] 98.1 °F (36.7 °C)  Heart Rate:  [80-90] 86  Resp:  [16-18] 16  BP: (130-170)/() 159/76  Body mass index is 20.76 kg/m².    Intake/Output Summary (Last 24 hours) at 2021 0822  Last data filed at 2021 0610  Gross per 24 hour   Intake 120 ml   Output 950 ml   Net -830 ml       Physical Exam  Vitals and nursing note reviewed.   Constitutional:       Appearance: Normal appearance.   HENT:      Head: Normocephalic and atraumatic.   Cardiovascular:      Rate and Rhythm: Normal rate and regular rhythm.   Pulmonary:      Effort: Pulmonary effort is normal.      Breath sounds: Normal breath sounds.   Abdominal:      General: There is distension.      Palpations: Abdomen is soft.      Tenderness: There is abdominal  tenderness.      Comments: She still pretty tender to palpation but her abdomen is soft.  She is not guarding and there is no rebound.  The incision looks good with no surrounding erythema or drainage.   Neurological:      General: No focal deficit present.      Mental Status: She is alert and oriented to person, place, and time.   Psychiatric:         Mood and Affect: Mood normal.         Behavior: Behavior normal.           Results Review:       I reviewed the patient's new clinical results.  Results from last 7 days   Lab Units 06/08/21  0635 06/07/21  0701 06/06/21  0654 06/05/21  0500 06/04/21  1144 06/03/21  0555 06/02/21  0613   WBC 10*3/mm3 7.12 8.11 10.27 11.89* 14.26* 17.63* 8.95   HEMOGLOBIN g/dL 10.0* 10.5* 10.1* 10.6* 11.4* 10.8* 9.8*   PLATELETS 10*3/mm3 351 454* 420 427 485* 409 360     Results from last 7 days   Lab Units 06/08/21  0635 06/07/21  0701 06/06/21  0654 06/05/21  0500 06/04/21  1144 06/03/21  0555 06/02/21  0613   SODIUM mmol/L 133* 135* 131* 132* 132* 134* 133*   POTASSIUM mmol/L 4.4 3.9 4.2 4.5 4.8 4.9 3.7   CHLORIDE mmol/L 102 101 101 100 100 100 100   CO2 mmol/L 26.2 26.5 24.9 24.5 24.6 25.9 27.1   BUN mg/dL 11 7* 5* 8 10 7* 4*   CREATININE mg/dL 0.38* 0.46* 0.43* 0.42* 0.50* 0.54* 0.48*   CALCIUM mg/dL 8.3 8.1* 7.8* 8.1* 8.2 8.3 8.3   MAGNESIUM mg/dL 2.0 1.9 2.0  --   --   --   --    PHOSPHORUS mg/dL 2.3* 2.7 1.9*  --   --   --   --    Estimated Creatinine Clearance: 35.6 mL/min (A) (by C-G formula based on SCr of 0.38 mg/dL (L)).      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]  Yes   • Mitral valve regurgitation [I34.0]  Yes   • Leukocytosis [D72.829]  Yes   • Hyperglycemia [R73.9]  Yes   • Anemia [D64.9]  Yes   • Ischemic colitis (CMS/HCC) [K55.9]  Yes   • Iron deficiency anemia due to chronic blood loss [D50.0]  Yes   • PSVT (paroxysmal  supraventricular tachycardia) (CMS/HCC) [I47.1]  Yes      Resolved Hospital Problems    Diagnosis Date Resolved POA   • Hypokalemia [E87.6] 06/04/2021 Yes       PLAN  Is a 89-year-old female that presented to the hospital with abdominal pain and initially felt to have colitis and was found to have a cecal mass and is undergone a right hemicolectomy.  -She is postoperative day 6, unfortunately dealing with a progressive postoperative ileus  -Abdomen remains distended with sluggish bowel sounds.  -General surgery has initiated a full liquid diet we will see how she tolerates  -I do not see any need for continued telemetry monitoring at this time.  It can be discontinued and transfer to med surg  -PICC line placed in right upper extremity tolerated well continue TPN  -Mechanical DVT prophylaxis  -Limited DNR    Disposition  To be determined      Diallo Cha MD  [facility]  06/08/21  08:22 EDT

## 2021-06-09 ENCOUNTER — APPOINTMENT (OUTPATIENT)
Dept: GENERAL RADIOLOGY | Facility: HOSPITAL | Age: 86
End: 2021-06-09

## 2021-06-09 LAB
ANION GAP SERPL CALCULATED.3IONS-SCNC: 8.4 MMOL/L (ref 5–15)
BASOPHILS # BLD AUTO: 0.04 10*3/MM3 (ref 0–0.2)
BASOPHILS NFR BLD AUTO: 0.5 % (ref 0–1.5)
BUN SERPL-MCNC: 12 MG/DL (ref 8–23)
BUN/CREAT SERPL: 30.8 (ref 7–25)
CALCIUM SPEC-SCNC: 8 MG/DL (ref 8.2–9.6)
CHLORIDE SERPL-SCNC: 100 MMOL/L (ref 98–107)
CO2 SERPL-SCNC: 23.6 MMOL/L (ref 22–29)
CREAT SERPL-MCNC: 0.39 MG/DL (ref 0.57–1)
DEPRECATED RDW RBC AUTO: 43.8 FL (ref 37–54)
EOSINOPHIL # BLD AUTO: 0.11 10*3/MM3 (ref 0–0.4)
EOSINOPHIL NFR BLD AUTO: 1.4 % (ref 0.3–6.2)
ERYTHROCYTE [DISTWIDTH] IN BLOOD BY AUTOMATED COUNT: 14 % (ref 12.3–15.4)
GFR SERPL CREATININE-BSD FRML MDRD: >150 ML/MIN/1.73
GLUCOSE SERPL-MCNC: 203 MG/DL (ref 65–99)
HCT VFR BLD AUTO: 30.8 % (ref 34–46.6)
HGB BLD-MCNC: 9.9 G/DL (ref 12–15.9)
IMM GRANULOCYTES # BLD AUTO: 0.03 10*3/MM3 (ref 0–0.05)
IMM GRANULOCYTES NFR BLD AUTO: 0.4 % (ref 0–0.5)
LYMPHOCYTES # BLD AUTO: 0.88 10*3/MM3 (ref 0.7–3.1)
LYMPHOCYTES NFR BLD AUTO: 11.1 % (ref 19.6–45.3)
MAGNESIUM SERPL-MCNC: 2 MG/DL (ref 1.7–2.3)
MCH RBC QN AUTO: 27.7 PG (ref 26.6–33)
MCHC RBC AUTO-ENTMCNC: 32.1 G/DL (ref 31.5–35.7)
MCV RBC AUTO: 86.3 FL (ref 79–97)
MONOCYTES # BLD AUTO: 1.13 10*3/MM3 (ref 0.1–0.9)
MONOCYTES NFR BLD AUTO: 14.3 % (ref 5–12)
NEUTROPHILS NFR BLD AUTO: 5.73 10*3/MM3 (ref 1.7–7)
NEUTROPHILS NFR BLD AUTO: 72.3 % (ref 42.7–76)
NRBC BLD AUTO-RTO: 0 /100 WBC (ref 0–0.2)
PHOSPHATE SERPL-MCNC: 3.1 MG/DL (ref 2.5–4.5)
PLATELET # BLD AUTO: 350 10*3/MM3 (ref 140–450)
PMV BLD AUTO: 8.5 FL (ref 6–12)
POTASSIUM SERPL-SCNC: 4.2 MMOL/L (ref 3.5–5.2)
RBC # BLD AUTO: 3.57 10*6/MM3 (ref 3.77–5.28)
SODIUM SERPL-SCNC: 132 MMOL/L (ref 136–145)
WBC # BLD AUTO: 7.92 10*3/MM3 (ref 3.4–10.8)

## 2021-06-09 PROCEDURE — 25010000002 CALCIUM GLUCONATE PER 10 ML: Performed by: SURGERY

## 2021-06-09 PROCEDURE — 74018 RADEX ABDOMEN 1 VIEW: CPT

## 2021-06-09 PROCEDURE — 84100 ASSAY OF PHOSPHORUS: CPT | Performed by: SURGERY

## 2021-06-09 PROCEDURE — 99024 POSTOP FOLLOW-UP VISIT: CPT | Performed by: SURGERY

## 2021-06-09 PROCEDURE — 85025 COMPLETE CBC W/AUTO DIFF WBC: CPT | Performed by: INTERNAL MEDICINE

## 2021-06-09 PROCEDURE — 83735 ASSAY OF MAGNESIUM: CPT | Performed by: SURGERY

## 2021-06-09 PROCEDURE — 25010000002 POTASSIUM CHLORIDE PER 2 MEQ OF POTASSIUM: Performed by: SURGERY

## 2021-06-09 PROCEDURE — 80048 BASIC METABOLIC PNL TOTAL CA: CPT | Performed by: INTERNAL MEDICINE

## 2021-06-09 PROCEDURE — 25010000002 MAGNESIUM SULFATE PER 500 MG OF MAGNESIUM: Performed by: SURGERY

## 2021-06-09 RX ADMIN — SODIUM CHLORIDE, PRESERVATIVE FREE 10 ML: 5 INJECTION INTRAVENOUS at 21:10

## 2021-06-09 RX ADMIN — CALCIUM GLUCONATE: 98 INJECTION, SOLUTION INTRAVENOUS at 18:03

## 2021-06-09 RX ADMIN — METOPROLOL TARTRATE 12.5 MG: 25 TABLET, FILM COATED ORAL at 08:26

## 2021-06-09 RX ADMIN — Medication 1 TABLET: at 08:26

## 2021-06-09 RX ADMIN — FAMOTIDINE 20 MG: 10 INJECTION INTRAVENOUS at 08:26

## 2021-06-09 RX ADMIN — SMOFLIPID 100 ML: 6; 6; 5; 3 INJECTION, EMULSION INTRAVENOUS at 18:03

## 2021-06-09 RX ADMIN — SODIUM CHLORIDE, PRESERVATIVE FREE 10 ML: 5 INJECTION INTRAVENOUS at 08:26

## 2021-06-09 RX ADMIN — ASPIRIN 81 MG: 81 TABLET, COATED ORAL at 21:07

## 2021-06-09 RX ADMIN — ACETAMINOPHEN 650 MG: 325 TABLET, FILM COATED ORAL at 18:48

## 2021-06-09 RX ADMIN — METOPROLOL TARTRATE 12.5 MG: 25 TABLET, FILM COATED ORAL at 21:07

## 2021-06-09 NOTE — CASE MANAGEMENT/SOCIAL WORK
Continued Stay Note  [facility]     Patient Name: Jaqueline Rojas  MRN: [id number]  Today's Date: 6/9/2021    Admit Date: 5/24/2021    Discharge Plan     Row Name 06/09/21 1301       Plan    Plan  Plans dc home with assist of daughter.    Patient/Family in Agreement with Plan  yes    Plan Comments  Discharge plans unchanged. Plans dc home with assist of daughter. Has a cane and grab bars in the bathroom. No PCP - given list of [facility] physicians. Per PT, patient needs a walker but no further acute PT needs (ambulated 300 ft with Rwx and SBA). Currently on TPN and lipids. No plan to continue at home? Family to transfer per private auto. Continue to follow.....[facility]        Discharge Codes    No documentation.             Anna Hope RN

## 2021-06-09 NOTE — PROGRESS NOTES
"Pharmacy to dose TPN - Daily Progress Note  Patient: Jaqueline Rojas  MRN#: [id number]  Admission Date: 524    TPN #4 per Dr. Paez  Indication:Prolonged Paralytic Ileus  S/P Right hemicolectomy, POD 7    Principal Problem:    Cecum mass  Active Problems:    PSVT (paroxysmal supraventricular tachycardia) (CMS/HCC)    Ischemic colitis (CMS/HCC)    Mitral valve regurgitation    Leukocytosis    Hyperglycemia    Anemia    Superior mesenteric artery stenosis (CMS/HCC)    Iron deficiency anemia due to chronic blood loss    Hyponatremia    Subjective/Objective  89 y.o. female 162.6 cm (64.02\") 54.9 kg (121 lb)  Results from last 7 days   Lab Units 21  0944 21  0635 21  0701   SODIUM mmol/L 132* 133* 135*   POTASSIUM mmol/L 4.2 4.4 3.9   CHLORIDE mmol/L 100 102 101   CO2 mmol/L 23.6 26.2 26.5   BUN mg/dL 12 11 7*   CREATININE mg/dL 0.39* 0.38* 0.46*   CALCIUM mg/dL 8.0* 8.3 8.1*   ALBUMIN g/dL  --  2.70* 2.80*   BILIRUBIN mg/dL  --  0.3 0.3   ALK PHOS U/L  --  44 43   ALT (SGPT) U/L  --  9 11   AST (SGOT) U/L  --  13 13   GLUCOSE mg/dL 203* 174* 169*   MAGNESIUM mg/dL 2.0 2.0 1.9   PHOSPHORUS mg/dL 3.1 2.3* 2.7   TRIGLYCERIDES mg/dL  --   --  96    Corrected calcium for albumin = 9.34  I/O last 3 completed shifts:  In: 730 [P.O.:480; IV Piggyback:250]  Out:  [Urine:]    Diet Orders (active) (From admission, onward)     Start     Ordered    21  Diet Full Liquid  Diet Effective Now      21 09              Additional insulin administration while previous TPN infusin units  Additional electrolyte administration while previous TPN infusing: none  Acid suppression: Famotidine 20mg IV q24h    Dietitian recommendations ()  Till po established, rec 1200 dextrose, 65gm AA, and 20% 100ml lipids.    Daily Assessment  Current macronutrients: protein 65 gm/day, dextrose 100 kcal/day, lipids 200 kcal MWF utilizing SMOFlipids  Fluid rate: 60 mL/hr  Last charted 25% of dinner on " 6/8    Plan      Patient continues on full liquid diet with supplements. Phos and potassium have stabilized. Sodium continues to decline - will continue to increase in TPN.     Will continue with same macronutrients given PO intake.   Electrolytes as follows.     Based on the above labs and current dietary intake, will add the following electrolytes/additives to the TPN.               Sodium Chloride:100 mEq (increased from 90)               Sodium Phosphate: 15 mEq               Potassium Chloride: 20 mEq               Potassium Phosphate: 22mEq              Calcium Gluconate: 9 mEq              Magnesium Sulfate: 10 mEq              Multivitamin: none - getting PO MVI                   Trace Elements: 1mL     Labs to be ordered: BMP, Mag, and Phos     Pharmacy will continue to follow.     Mala Flores, Pharm.D., [facility]  Oncology Pharmacy Specialist  [phone]

## 2021-06-09 NOTE — PROGRESS NOTES
[phone]   LOS: 16 days     Name: Jaqueline Rojas  Age/Sex: 89 y.o. female  :  1924        PCP: Provider, No Known  Chief Complaint   Patient presents with   • Abdominal Pain      Subjective   She has not been sleeping well.  There removed her Trevino catheter but it had to be replaced due to continued urinary retention.  Unfortunately the new Trevino catheter has been leaking and she had to have her bed changed multiple times.  She also a very large incontinent stool earlier that was all liquid.  She otherwise denies any nausea vomiting fevers or chills.  Her pain is currently controlled.  General: No Fever or Chills, Cardiac: No Chest Pain or Palpitations, Resp: No Cough or SOA, GI: No Nausea, Vomiting, or Diarrhea and Other: No bleeding    aspirin, 81 mg, Oral, Daily  digoxin, 0.5 mg, Intravenous, Once  famotidine, 20 mg, Intravenous, Daily  Fat Emul Fish Oil/Plant Based, 100 mL, Intravenous, Once per day on   metoprolol tartrate, 12.5 mg, Oral, Q12H  multivitamin, 1 tablet, Oral, Daily  sodium chloride, 10 mL, Intravenous, Q12H  sodium chloride, 10 mL, Intravenous, Q12H  sodium chloride, 10 mL, Intravenous, Q12H      Adult Central 2-in-1 TPN, , Last Rate: 60 mL/hr at 21 181  Pharmacy to Dose TPN,         Objective   Vital Signs  Temp:  [97 °F (36.1 °C)-97.5 °F (36.4 °C)] 97.2 °F (36.2 °C)  Heart Rate:  [78-88] 82  Resp:  [16] 16  BP: (125-166)/(74-87) 125/74  Body mass index is 20.76 kg/m².    Intake/Output Summary (Last 24 hours) at 2021 0820  Last data filed at 2021 0455  Gross per 24 hour   Intake 610 ml   Output 1202 ml   Net -592 ml       Physical Exam  Vitals and nursing note reviewed.   Constitutional:       Appearance: Normal appearance.   HENT:      Head: Normocephalic and atraumatic.   Cardiovascular:      Rate and Rhythm: Normal rate and regular rhythm.   Pulmonary:      Effort: Pulmonary effort is normal.      Breath sounds: Normal breath sounds.   Abdominal:       General: There is distension.      Palpations: Abdomen is soft.      Tenderness: There is abdominal tenderness.      Comments: She still pretty tender to palpation but her abdomen is soft.  She is not guarding and there is no rebound.  The incision looks good with no surrounding erythema or drainage.   Neurological:      General: No focal deficit present.      Mental Status: She is alert and oriented to person, place, and time.   Psychiatric:         Mood and Affect: Mood normal.         Behavior: Behavior normal.           Results Review:       I reviewed the patient's new clinical results.  Results from last 7 days   Lab Units 06/08/21  0635 06/07/21  0701 06/06/21  0654 06/05/21  0500 06/04/21  1144 06/03/21  0555   WBC 10*3/mm3 7.12 8.11 10.27 11.89* 14.26* 17.63*   HEMOGLOBIN g/dL 10.0* 10.5* 10.1* 10.6* 11.4* 10.8*   PLATELETS 10*3/mm3 351 454* 420 427 485* 409     Results from last 7 days   Lab Units 06/08/21  0635 06/07/21  0701 06/06/21  0654 06/05/21  0500 06/04/21  1144 06/03/21  0555   SODIUM mmol/L 133* 135* 131* 132* 132* 134*   POTASSIUM mmol/L 4.4 3.9 4.2 4.5 4.8 4.9   CHLORIDE mmol/L 102 101 101 100 100 100   CO2 mmol/L 26.2 26.5 24.9 24.5 24.6 25.9   BUN mg/dL 11 7* 5* 8 10 7*   CREATININE mg/dL 0.38* 0.46* 0.43* 0.42* 0.50* 0.54*   CALCIUM mg/dL 8.3 8.1* 7.8* 8.1* 8.2 8.3   MAGNESIUM mg/dL 2.0 1.9 2.0  --   --   --    PHOSPHORUS mg/dL 2.3* 2.7 1.9*  --   --   --    Estimated Creatinine Clearance: 35.6 mL/min (A) (by C-G formula based on SCr of 0.38 mg/dL (L)).      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]  Yes   • Mitral valve regurgitation [I34.0]  Yes   • Leukocytosis [D72.829]  Yes   • Hyperglycemia [R73.9]  Yes   • Anemia [D64.9]  Yes   • Ischemic colitis (CMS/HCC) [K55.9]  Yes   • Iron deficiency anemia due to chronic blood loss [D50.0]  Yes   • PSVT  (paroxysmal supraventricular tachycardia) (CMS/HCC) [I47.1]  Yes      Resolved Hospital Problems    Diagnosis Date Resolved POA   • Hypokalemia [E87.6] 06/04/2021 Yes       PLAN  Is a 89-year-old female that presented to the hospital with abdominal pain and initially felt to have colitis and was found to have a cecal mass and is undergone a right hemicolectomy.  -She is postoperative day 6, unfortunately dealing with a progressive postoperative ileus  -Abdomen remains distended with sluggish bowel sounds.  -General surgery has initiated a full liquid diet we will see how she tolerates  -I do not see any need for continued telemetry monitoring at this time.  It can be discontinued and transfer to med surg  -Given continued distention we will go ahead and repeat a KUB today.  This was independently reviewed and shows stable findings consistent with postoperative ileus.  -PICC line placed in right upper extremity tolerated well continue TPN  -Mechanical DVT prophylaxis  -Limited DNR    Disposition  To be determined      Diallo Cha MD  [facility]  06/09/21  08:20 EDT

## 2021-06-09 NOTE — PLAN OF CARE
Goal Outcome Evaluation:  Plan of Care Reviewed With: patient        Progress: improving  Outcome Summary: pt a/o with no c/o pain. + BM this am, TPN infusing with no issues noted. Lipids to be hung this evening PICC dressing CDI with + blood return.up ambulating with daughter in halls today, tolerated well. No s/s of distress noted. Will continue to monitor and support as needed.   Patient was seen and assessed while wearing personal protective equipment including facemask, protective eyewear and gloves.  Hand hygiene performed prior to entering the room and upon exiting with doffing of gloves.

## 2021-06-09 NOTE — PROGRESS NOTES
Chief Complaint:    S/P Right hemicolectomy, POD 7    Subjective:    The patient is feeling well but not able to eat much.  She is not having any nausea or vomiting but has abdominal pain with eating.  She remains distended but is passing flatus and had a large liquid bowel movement.  Abdominal x-rays show dilated loops of small bowel and a distended colon consistent with an ileus.    Objective:    Temp:  [97.1 °F (36.2 °C)-97.2 °F (36.2 °C)] 97.2 °F (36.2 °C)  Heart Rate:  [78-82] 82  Resp:  [16] 16  BP: (125-166)/(74-87) 125/74    Physical Exam  Constitutional:       Appearance: She is not ill-appearing or toxic-appearing.   Abdominal:      General: Abdomen is protuberant. There is distension.      Palpations: Abdomen is soft.      Tenderness: There is no abdominal tenderness.   Neurological:      Mental Status: She is alert.   Psychiatric:         Behavior: Behavior is cooperative.         Results:    WBC is 7.92.  H/H is 9.9/30.8.  BUN is 12 and creatinine 0.39.    Impression/Plan:    The patient is POD 7 from a right hemicolectomy for a cecal cancer.  The patient continues to have an expected postop ileus with slow resolution.  She will need to continue on TPN.    She was encouraged to increase her activity.    Baldemar Griffin Jr., M.D.

## 2021-06-09 NOTE — PLAN OF CARE
Goal Outcome Evaluation:  Plan of Care Reviewed With: patient        Progress: improving  Outcome Summary: patient tolerating TPN and full liquids; BM on 6/8 but abdomen continues to be distended, BS hypoactive; alert and oriented

## 2021-06-10 PROBLEM — C18.0 CECAL CANCER (HCC): Status: ACTIVE | Noted: 2021-06-10

## 2021-06-10 PROBLEM — K91.89 POSTOPERATIVE ILEUS (HCC): Status: ACTIVE | Noted: 2021-06-10

## 2021-06-10 PROBLEM — D72.829 LEUKOCYTOSIS: Status: RESOLVED | Noted: 2021-05-25 | Resolved: 2021-06-10

## 2021-06-10 PROBLEM — K56.7 POSTOPERATIVE ILEUS (HCC): Status: ACTIVE | Noted: 2021-06-10

## 2021-06-10 LAB
ANION GAP SERPL CALCULATED.3IONS-SCNC: 7.5 MMOL/L (ref 5–15)
BASOPHILS # BLD AUTO: 0.03 10*3/MM3 (ref 0–0.2)
BASOPHILS NFR BLD AUTO: 0.5 % (ref 0–1.5)
BUN SERPL-MCNC: 12 MG/DL (ref 8–23)
BUN/CREAT SERPL: 37.5 (ref 7–25)
CALCIUM SPEC-SCNC: 8 MG/DL (ref 8.2–9.6)
CHLORIDE SERPL-SCNC: 100 MMOL/L (ref 98–107)
CO2 SERPL-SCNC: 25.5 MMOL/L (ref 22–29)
CREAT SERPL-MCNC: 0.32 MG/DL (ref 0.57–1)
DEPRECATED RDW RBC AUTO: 42.4 FL (ref 37–54)
EOSINOPHIL # BLD AUTO: 0.17 10*3/MM3 (ref 0–0.4)
EOSINOPHIL NFR BLD AUTO: 2.6 % (ref 0.3–6.2)
ERYTHROCYTE [DISTWIDTH] IN BLOOD BY AUTOMATED COUNT: 13.7 % (ref 12.3–15.4)
GFR SERPL CREATININE-BSD FRML MDRD: >150 ML/MIN/1.73
GLUCOSE SERPL-MCNC: 169 MG/DL (ref 65–99)
HCT VFR BLD AUTO: 27.4 % (ref 34–46.6)
HGB BLD-MCNC: 9.2 G/DL (ref 12–15.9)
IMM GRANULOCYTES # BLD AUTO: 0.04 10*3/MM3 (ref 0–0.05)
IMM GRANULOCYTES NFR BLD AUTO: 0.6 % (ref 0–0.5)
LYMPHOCYTES # BLD AUTO: 0.73 10*3/MM3 (ref 0.7–3.1)
LYMPHOCYTES NFR BLD AUTO: 11.2 % (ref 19.6–45.3)
MAGNESIUM SERPL-MCNC: 2 MG/DL (ref 1.7–2.3)
MCH RBC QN AUTO: 28.3 PG (ref 26.6–33)
MCHC RBC AUTO-ENTMCNC: 33.6 G/DL (ref 31.5–35.7)
MCV RBC AUTO: 84.3 FL (ref 79–97)
MONOCYTES # BLD AUTO: 1.11 10*3/MM3 (ref 0.1–0.9)
MONOCYTES NFR BLD AUTO: 17.1 % (ref 5–12)
NEUTROPHILS NFR BLD AUTO: 4.42 10*3/MM3 (ref 1.7–7)
NEUTROPHILS NFR BLD AUTO: 68 % (ref 42.7–76)
NRBC BLD AUTO-RTO: 0 /100 WBC (ref 0–0.2)
PHOSPHATE SERPL-MCNC: 2.7 MG/DL (ref 2.5–4.5)
PLATELET # BLD AUTO: 287 10*3/MM3 (ref 140–450)
PMV BLD AUTO: 8.5 FL (ref 6–12)
POTASSIUM SERPL-SCNC: 3.9 MMOL/L (ref 3.5–5.2)
RBC # BLD AUTO: 3.25 10*6/MM3 (ref 3.77–5.28)
SODIUM SERPL-SCNC: 133 MMOL/L (ref 136–145)
WBC # BLD AUTO: 6.5 10*3/MM3 (ref 3.4–10.8)

## 2021-06-10 PROCEDURE — 83735 ASSAY OF MAGNESIUM: CPT | Performed by: SURGERY

## 2021-06-10 PROCEDURE — 25010000002 CALCIUM GLUCONATE PER 10 ML: Performed by: SURGERY

## 2021-06-10 PROCEDURE — 25010000002 MAGNESIUM SULFATE PER 500 MG OF MAGNESIUM: Performed by: SURGERY

## 2021-06-10 PROCEDURE — 80048 BASIC METABOLIC PNL TOTAL CA: CPT | Performed by: INTERNAL MEDICINE

## 2021-06-10 PROCEDURE — 85025 COMPLETE CBC W/AUTO DIFF WBC: CPT | Performed by: INTERNAL MEDICINE

## 2021-06-10 PROCEDURE — 25010000002 POTASSIUM CHLORIDE PER 2 MEQ OF POTASSIUM: Performed by: SURGERY

## 2021-06-10 PROCEDURE — 99024 POSTOP FOLLOW-UP VISIT: CPT | Performed by: SURGERY

## 2021-06-10 PROCEDURE — 25010000002 ONDANSETRON PER 1 MG: Performed by: SURGERY

## 2021-06-10 PROCEDURE — 84100 ASSAY OF PHOSPHORUS: CPT | Performed by: SURGERY

## 2021-06-10 RX ORDER — BISACODYL 10 MG
10 SUPPOSITORY, RECTAL RECTAL DAILY PRN
Status: DISCONTINUED | OUTPATIENT
Start: 2021-06-10 | End: 2021-06-11

## 2021-06-10 RX ADMIN — FAMOTIDINE 20 MG: 10 INJECTION INTRAVENOUS at 09:26

## 2021-06-10 RX ADMIN — Medication 1 TABLET: at 09:26

## 2021-06-10 RX ADMIN — ONDANSETRON 4 MG: 2 INJECTION INTRAMUSCULAR; INTRAVENOUS at 20:09

## 2021-06-10 RX ADMIN — METOPROLOL TARTRATE 12.5 MG: 25 TABLET, FILM COATED ORAL at 20:51

## 2021-06-10 RX ADMIN — ACETAMINOPHEN 650 MG: 325 TABLET, FILM COATED ORAL at 09:43

## 2021-06-10 RX ADMIN — SODIUM CHLORIDE, PRESERVATIVE FREE 10 ML: 5 INJECTION INTRAVENOUS at 21:01

## 2021-06-10 RX ADMIN — ACETAMINOPHEN 650 MG: 325 TABLET, FILM COATED ORAL at 18:23

## 2021-06-10 RX ADMIN — POTASSIUM CHLORIDE: 2 INJECTION, SOLUTION, CONCENTRATE INTRAVENOUS at 18:10

## 2021-06-10 RX ADMIN — HYDROCODONE BITARTRATE AND ACETAMINOPHEN 1 TABLET: 5; 325 TABLET ORAL at 20:51

## 2021-06-10 RX ADMIN — SODIUM CHLORIDE, PRESERVATIVE FREE 10 ML: 5 INJECTION INTRAVENOUS at 09:28

## 2021-06-10 RX ADMIN — SODIUM CHLORIDE, PRESERVATIVE FREE 10 ML: 5 INJECTION INTRAVENOUS at 09:27

## 2021-06-10 RX ADMIN — METOPROLOL TARTRATE 12.5 MG: 25 TABLET, FILM COATED ORAL at 09:26

## 2021-06-10 RX ADMIN — ASPIRIN 81 MG: 81 TABLET, COATED ORAL at 20:51

## 2021-06-10 NOTE — PROGRESS NOTES
Name: Jaqueline Rojas ADMIT: 2021   : 1924  PCP: Provider, No Known    MRN: [id number] LOS: 17 days   AGE/SEX: 89 y.o. female  ROOM: [facility]     Subjective   Subjective   Not much change.  Not eating much at all    Review of Systems   Gastrointestinal: Positive for abdominal distention and nausea.        Objective   Objective   Vital Signs  Temp:  [97 °F (36.1 °C)-97.9 °F (36.6 °C)] 97.2 °F (36.2 °C)  Heart Rate:  [60-85] 82  Resp:  [16] 16  BP: (137-148)/(70-84) 137/70  SpO2:  [97 %-98 %] 98 %  on   ;   Device (Oxygen Therapy): room air  Body mass index is 20.76 kg/m².  Physical Exam  Vitals and nursing note reviewed.   Constitutional:       Appearance: Normal appearance.   HENT:      Head: Normocephalic and atraumatic.   Cardiovascular:      Rate and Rhythm: Normal rate and regular rhythm.      Heart sounds: No murmur heard.     Pulmonary:      Effort: Pulmonary effort is normal.      Breath sounds: Normal breath sounds.   Abdominal:      General: There is distension.      Palpations: Abdomen is soft.      Tenderness: There is abdominal tenderness.      Comments: Hypoactive bowel sounds   Musculoskeletal:      Right lower leg: No edema.      Left lower leg: No edema.   Skin:     General: Skin is warm and dry.      Findings: No rash.   Neurological:      General: No focal deficit present.      Mental Status: She is alert and oriented to person, place, and time.   Psychiatric:         Mood and Affect: Mood normal.         Behavior: Behavior normal.         Results Review     I reviewed the patient's new clinical results.  Results from last 7 days   Lab Units 06/10/21  0433 21  0944 21  0635 21  0701   WBC 10*3/mm3 6.50 7.92 7.12 8.11   HEMOGLOBIN g/dL 9.2* 9.9* 10.0* 10.5*   PLATELETS 10*3/mm3 287 350 351 454*     Results from last 7 days   Lab Units 06/10/21  0433 21  0944 21  0635 21  0701   SODIUM mmol/L 133* 132* 133* 135*   POTASSIUM mmol/L 3.9 4.2 4.4 3.9    CHLORIDE mmol/L 100 100 102 101   CO2 mmol/L 25.5 23.6 26.2 26.5   BUN mg/dL 12 12 11 7*   CREATININE mg/dL 0.32* 0.39* 0.38* 0.46*   GLUCOSE mg/dL 169* 203* 174* 169*   Estimated Creatinine Clearance: 35.6 mL/min (A) (by C-G formula based on SCr of 0.32 mg/dL (L)).  Results from last 7 days   Lab Units 06/08/21  0635 06/07/21  0701 06/06/21  0654   ALBUMIN g/dL 2.70* 2.80* 2.20*   BILIRUBIN mg/dL 0.3 0.3  --    ALK PHOS U/L 44 43  --    AST (SGOT) U/L 13 13  --    ALT (SGPT) U/L 9 11  --      Results from last 7 days   Lab Units 06/10/21  0433 06/09/21  0944 06/08/21  0635 06/07/21  0701 06/06/21  0654   CALCIUM mg/dL 8.0* 8.0* 8.3 8.1* 7.8*   ALBUMIN g/dL  --   --  2.70* 2.80* 2.20*   MAGNESIUM mg/dL 2.0 2.0 2.0 1.9 2.0   PHOSPHORUS mg/dL 2.7 3.1 2.3* 2.7 1.9*       COVID19   Date Value Ref Range Status   06/01/2021 Not Detected Not Detected - Ref. Range Final   05/28/2021 Not Detected Not Detected - Ref. Range Final     No results found for: HGBA1C, POCGLU    XR Abdomen KUB  ONE VIEW PORTABLE ABDOMEN AT 10:38 AM     HISTORY: Recent abdominal surgery. Follow up of ileus.     FINDINGS: There is considerable bowel gas scattered in both large and  small bowel most consistent with postoperative ileus and showing no  change from 3 days ago. The gas-filled transverse colon measures up to  5.5 cm in diameter which is unchanged.     This report was finalized on 6/9/2021 1:07 PM by Dr. Ivan Johnson M.D.       Scheduled Medications  aspirin, 81 mg, Oral, Daily  digoxin, 0.5 mg, Intravenous, Once  famotidine, 20 mg, Intravenous, Daily  Fat Emul Fish Oil/Plant Based, 100 mL, Intravenous, Once per day on Mon Wed Fri  metoprolol tartrate, 12.5 mg, Oral, Q12H  multivitamin, 1 tablet, Oral, Daily  sodium chloride, 10 mL, Intravenous, Q12H  sodium chloride, 10 mL, Intravenous, Q12H  sodium chloride, 10 mL, Intravenous, Q12H    Infusions  Adult Central 2-in-1 TPN, , Last Rate: 60 mL/hr at 06/09/21 1803  Pharmacy to Dose TPN,      Diet  Diet Full Liquid  Adult Central 2-in-1 TPN       Assessment/Plan     Active Hospital Problems    Diagnosis  POA   • **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]  Yes   • 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    · Continue TPN and diet as tolerated  · Replacing electrolytes via TPN as needed  · Encouraged her to continue to ambulate with PT  · Following surgical recommendations  · SCDs for DVT prophylaxis.  · Limited code (no CPR, no intubation).  · Dispo: Eventual DC home planned when GI function returns and joe PO      Wil Shields MD  [facility]  06/10/21  09:19 EDT

## 2021-06-10 NOTE — PROGRESS NOTES
"Pharmacy to dose TPN - Daily Progress Note  Patient: Jaqueline Rojas  MRN#: [id number]  Admission Date: 524    TPN #5 per Dr. Paez  Indication:Prolonged Paralytic Ileus  S/P Right hemicolectomy, POD 8    Principal Problem:    Cecum mass  Active Problems:    PSVT (paroxysmal supraventricular tachycardia) (CMS/HCC)    Mitral valve regurgitation    Hyperglycemia    Anemia    Superior mesenteric artery stenosis (CMS/HCC)    Iron deficiency anemia due to chronic blood loss    Hyponatremia    Cecal cancer (CMS/HCC)    Postoperative ileus (CMS/HCC)    Subjective/Objective  89 y.o. female 162.6 cm (64.02\") 54.9 kg (121 lb)  Results from last 7 days   Lab Units 06/10/21  0433 21  0635 21  0701   SODIUM mmol/L 133* 133* 135*   POTASSIUM mmol/L 3.9 4.4 3.9   CHLORIDE mmol/L 100 102 101   CO2 mmol/L 25.5 26.2 26.5   BUN mg/dL 12 11 7*   CREATININE mg/dL 0.32* 0.38* 0.46*   CALCIUM mg/dL 8.0* 8.3 8.1*   ALBUMIN g/dL  --  2.70* 2.80*   BILIRUBIN mg/dL  --  0.3 0.3   ALK PHOS U/L  --  44 43   ALT (SGPT) U/L  --  9 11   AST (SGOT) U/L  --  13 13   GLUCOSE mg/dL 169* 174* 169*   MAGNESIUM mg/dL 2.0 2.0 1.9   PHOSPHORUS mg/dL 2.7 2.3* 2.7   TRIGLYCERIDES mg/dL  --   --  96    Corrected calcium for albumin = 9.34  I/O last 3 completed shifts:  In: 210 [P.O.:210]  Out:  [Urine:]    Diet Orders (active) (From admission, onward)     Start     Ordered    21  Diet Full Liquid  Diet Effective Now      21 09              Additional insulin administration while previous TPN infusin units  Additional electrolyte administration while previous TPN infusing: none  Acid suppression: Famotidine 20mg IV q24h    Dietitian recommendations ()  Till po established, rec 1200 dextrose, 65gm AA, and 20% 100ml lipids.    Daily Assessment  Current macronutrients: protein 65 gm/day, dextrose 1000 kcal/day, lipids 200 kcal MWF utilizing SMOFlipids  Fluid rate: 60 mL/hr  Last charted 25% of dinner on   1. " Lytes close to WNL- sodium slightly low will adjsut    Plan      Patient continues on full liquid diet with supplements. Phos and potassium have stabilized. Sodium continues to decline - will continue to increase in TPN.     Goal macros at 65g of protein, 1200kacls of dextrose, smof lipids MWF  Electrolytes as follows.     Based on the above labs and current dietary intake, will add the following electrolytes/additives to the TPN.               Sodium Chloride:120 mEq (increased)              Sodium Phosphate: 20 mEq (increased)              Potassium Chloride: 30 mEq (increased)              Potassium Phosphate: 22mEq              Calcium Gluconate: 9 mEq              Magnesium Sulfate: 10 mEq              Multivitamin: none - getting PO MVI                   Trace Elements: 1mL     Labs to be ordered: BMP, Mag, and Phos     Pharmacy will continue to follow.     Sandro Gorman, PharmD  6/10

## 2021-06-10 NOTE — PAYOR COMM NOTE
"CONTINUED STAY REVIEW    REF #[id number]    F: [phone]        Nanci Bridges (89 y.o. Female)     Date of Birth Social Security Number Address Home Phone MRN    09/06/1924  [id number]  [facility] [id number] [phone] [id number]    Muslim Marital Status          Hinduism        Admission Date Admission Type Admitting Provider Attending Provider Department, Room/Bed    5/24/21 Emergency Samantha Leon MD [place], Matthew Brett, MD [address], P398/1    Discharge Date Discharge Disposition Discharge Destination                       Attending Provider: Wil Shields MD    Allergies: No Known Allergies    Isolation: None   Infection: None   Code Status: No CPR    Ht: 162.6 cm (64.02\")   Wt: 54.9 kg (121 lb)    Admission Cmt: None   Principal Problem: 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]                 Active Insurance as of 5/24/2021     Primary Coverage     Payor Plan Insurance Group Employer/Plan Group    AETNA MEDICARE REPLACEMENT AETNA MEDICARE REPLACEMENT [id number]     Payor Plan Address Payor Plan Phone Number Payor Plan Fax Number Effective Dates    [address] [phone]  1/1/2018 - None Entered    [facility] [id number]       Subscriber Name Subscriber Birth Date Member ID       NANCI BRIDGES 9/6/1924 [id number]                 Emergency Contacts      (Rel.) Home Phone Work Phone Mobile Phone    Sejal Muñoz (Daughter) [phone] -- [phone]    Kayden Bridges (Son) [phone] -- [phone]    DurhamMagda (Daughter) [phone] -- [phone]    Malena Bridges (Other) [phone] -- [phone]            Vital Signs (last day)     Date/Time   Temp   Temp src   Pulse   Resp   BP   Patient Position   SpO2    06/10/21 0738   97.2 (36.2)   Oral   82   16   137/70   Lying   98    06/10/21 0450   97.9 (36.6)   Oral   60   16   148/84   Lying   98    06/09/21 2019   97 (36.1)   Oral   83   16   137/74   Lying   98  "    06/09/21 1500   97.9 (36.6)   Oral   85   16   140/76   Lying   97    06/09/21 0755   97.2 (36.2)   Oral   82   16   125/74   Lying   98    06/09/21 0455   97.1 (36.2)   Oral   78   16   158/84   Lying   98              Oxygen Therapy (last day)     Date/Time   SpO2   Device (Oxygen Therapy)   Flow (L/min)   Oxygen Concentration (%)   ETCO2 (mmHg)    06/10/21 0738   98   room air   --   --   --    06/10/21 0450   98   room air   --   --   --    06/09/21 2056   --   room air   --   --   --    06/09/21 2019   98   room air   --   --   --    06/09/21 1500   97   room air   --   --   --    06/09/21 0755   98   room air   --   --   --    06/09/21 0455   98   room air   --   --   --              Lines, Drains & Airways    Active LDAs     Name:   Placement date:   Placement time:   Site:   Days:    PICC Double Lumen 06/06/21 Right Basilic   06/06/21    1748    Basilic   3    Peripheral IV 06/05/21 1907 Right Wrist   06/05/21    1907    Wrist   4    Peripheral IV 06/06/21 1302 Left Hand   06/06/21    1302    Hand   3    External Urinary Catheter   06/06/21    0110    --   4                  Medication Administration Report for Jaqueline Rojas as of 06/10/21 1015    Legend:    Given Hold Not Given Due Canceled Entry Other Actions    Time Time (Time) Time  Time-Action       Discontinued     Completed     Future     MAR Hold     Linked           Medications 06/10/21    aspirin EC tablet 81 mg  Dose: 81 mg  Freq: Daily Route: PO  Start: 05/25/21 0900    Admin Instructions:   Herbal/drug interaction: Avoid use with ginkgo biloba. Do not crush or chew.  Do not exceed 4 grams of aspirin in a 24 hr period.    If given for pain, use the following pain scale:   Mild Pain = Pain Score of 1-3, CPOT 1-2  Moderate Pain = Pain Score of 4-6, CPOT 3-4  Severe Pain = Pain Score of 7-10, CPOT 5-8     2100                    digoxin (LANOXIN) injection 0.5 mg  Dose: 0.5 mg  Freq: Once Route: IV  Start: 05/25/21 1530    Admin Instructions:     Check and record heart rate. Use filter needle to withdraw dose from ampule. Dose may be pushed over 5 minutes.         famotidine (PEPCID) injection 20 mg  Dose: 20 mg  Freq: Daily Route: IV  Start: 06/04/21 2000    Admin Instructions:   Dilute to 10 mL total volume and give IV push over 2 minutes.     0926                    Fat Emul Fish Oil/Plant Based (SMOFLIPID) 20 % emulsion 100 mL  Dose: 100 mL  Freq: Once per day on Mon Wed Fri Route: IV  Start: 06/07/21 1800    Admin Instructions:   Use a 1.2 micron filter         metoprolol tartrate (LOPRESSOR) tablet 12.5 mg  Dose: 12.5 mg  Freq: Every 12 Hours Scheduled Route: PO  Start: 05/26/21 1700    0926 2100                   multivitamin (THERAGRAN) tablet 1 tablet  Dose: 1 tablet  Freq: Daily Route: PO  Start: 05/25/21 0900    Admin Instructions:        0926                    sodium chloride 0.9 % flush 10 mL  Dose: 10 mL  Freq: Every 12 Hours Scheduled Route: IV  Start: 06/06/21 2100    Admin Instructions:   Lumen #2     0927     2100                   sodium chloride 0.9 % flush 10 mL  Dose: 10 mL  Freq: Every 12 Hours Scheduled Route: IV  Start: 06/06/21 2100    Admin Instructions:   Lumen #1     0927     2100                   sodium chloride 0.9 % flush 10 mL  Dose: 10 mL  Freq: Every 12 Hours Scheduled Route: IV  Start: 05/25/21 0030    0928     2100                     ,   Medication Administration Report for Jaqueline Rojas as of 06/10/21 1015    Legend:    Given Hold Not Given Due Canceled Entry Other Actions    Time Time (Time) Time  Time-Action       Discontinued     Completed     Future     MAR Hold     Linked           Medications 06/10/21    Adult Central 2-in-1 TPN  Rate: 60 mL/hr Freq: Continuous TPN Route: IV  Start: 06/09/21 1800   End: 06/10/21 1802    Admin Instructions:   TPN #4  Use a 0.22 micron filter.     Order specific questions:   Indication Prolonged Paralytic Ileus  Is patient volume restricted No          Pharmacy to Dose  TPN  Freq: Continuous PRN Route: XX  PRN Reason: Consult  Start: 06/06/21 1313    Order specific questions:   Indication Prolonged Paralytic Ileus  Location of venous access Central  Catheter position confirmed by x-ray No  Is patient volume restricted No         Future Medications  Medications 06/10/21       Adult Central 2-in-1 TPN  Rate: 60 mL/hr Freq: Continuous TPN Route: IV  Start: 06/10/21 1800   End: 06/11/21 1569    Admin Instructions:   TPN #5  Use a 0.22 micron filter.     Order specific questions:   Indication Prolonged Paralytic Ileus  Is patient volume restricted No      1800                       and   Medication Administration Report for Jaqueline Rojas as of 06/10/21 1015    Legend:    Given Hold Not Given Due Canceled Entry Other Actions    Time Time (Time) Time  Time-Action       Discontinued     Completed     Future     MAR Hold     Linked           Medications 06/10/21    acetaminophen (TYLENOL) tablet 650 mg  Dose: 650 mg  Freq: Every 4 Hours PRN Route: PO  PRN Reason: Mild Pain   Start: 05/24/21 8379    Admin Instructions:   Do not exceed 4 grams of acetaminophen in a 24 hr period.    If given for pain, use the following pain scale:   Mild Pain = Pain Score of 1-3, CPOT 1-2  Moderate Pain = Pain Score of 4-6, CPOT 3-4  Severe Pain = Pain Score of 7-10, CPOT 5-8  Do not exceed 4 grams of acetaminophen in a 24 hr period. Max dose of 2gm for AST/ALT greater than 120 units/L      If given for pain, use the following pain scale:   Mild Pain = Pain Score of 1-3, CPOT 1-2  Moderate Pain = Pain Score of 4-6, CPOT 3-4  Severe Pain = Pain Score of 7-10, CPOT 5-8     0943                   Or  acetaminophen (TYLENOL) 160 MG/5ML solution 650 mg  Dose: 650 mg  Freq: Every 4 Hours PRN Route: PO  PRN Reason: Mild Pain   Start: 05/24/21 2969    Admin Instructions:   Do not exceed 4 grams of acetaminophen in a 24 hr period.    If given for pain, use the following pain scale:   Mild Pain = Pain Score of 1-3,  CPOT 1-2  Moderate Pain = Pain Score of 4-6, CPOT 3-4  Severe Pain = Pain Score of 7-10, CPOT 5-8  Do not exceed 4 grams of acetaminophen in a 24 hr period. Max dose of 2gm for AST/ALT greater than 120 units/L      If given for pain, use the following pain scale:   Mild Pain = Pain Score of 1-3, CPOT 1-2  Moderate Pain = Pain Score of 4-6, CPOT 3-4  Severe Pain = Pain Score of 7-10, CPOT 5-8                        Or  acetaminophen (TYLENOL) suppository 650 mg  Dose: 650 mg  Freq: Every 4 Hours PRN Route: RE  PRN Reason: Mild Pain   Start: 05/24/21 2339    Admin Instructions:   Do not exceed 4 grams of acetaminophen in a 24 hr period. Max dose of 2gm for AST/ALT greater than 120 units/L      If given for pain, use the following pain scale:   Mild Pain = Pain Score of 1-3, CPOT 1-2  Moderate Pain = Pain Score of 4-6, CPOT 3-4  Severe Pain = Pain Score of 7-10, CPOT 5-8                            Lab Results (last 24 hours)     Procedure Component Value Units Date/Time    Basic Metabolic Panel [[id number]]  (Abnormal) Collected: 06/10/21 0433    Specimen: Blood Updated: 06/10/21 0527     Glucose 169 mg/dL      BUN 12 mg/dL      Creatinine 0.32 mg/dL      Sodium 133 mmol/L      Potassium 3.9 mmol/L      Chloride 100 mmol/L      CO2 25.5 mmol/L      Calcium 8.0 mg/dL      eGFR Non African Amer >150 mL/min/1.73      BUN/Creatinine Ratio 37.5     Anion Gap 7.5 mmol/L     Narrative:      GFR Normal >60  Chronic Kidney Disease <60  Kidney Failure <15      Phosphorus [[id number]]  (Normal) Collected: 06/10/21 0433    Specimen: Blood Updated: 06/10/21 0527     Phosphorus 2.7 mg/dL     Magnesium [[id number]]  (Normal) Collected: 06/10/21 0433    Specimen: Blood Updated: 06/10/21 0527     Magnesium 2.0 mg/dL     CBC & Differential [[id number]]  (Abnormal) Collected: 06/10/21 0433    Specimen: Blood Updated: 06/10/21 0457    Narrative:      The following orders were created for panel order CBC & Differential.  Procedure                                Abnormality         Status                     ---------                               -----------         ------                     CBC Auto Differential[[id number]]        Abnormal            Final result                 Please view results for these tests on the individual orders.    CBC Auto Differential [[id number]]  (Abnormal) Collected: 06/10/21 0433    Specimen: Blood Updated: 06/10/21 0457     WBC 6.50 10*3/mm3      RBC 3.25 10*6/mm3      Hemoglobin 9.2 g/dL      Hematocrit 27.4 %      MCV 84.3 fL      MCH 28.3 pg      MCHC 33.6 g/dL      RDW 13.7 %      RDW-SD 42.4 fl      MPV 8.5 fL      Platelets 287 10*3/mm3      Neutrophil % 68.0 %      Lymphocyte % 11.2 %      Monocyte % 17.1 %      Eosinophil % 2.6 %      Basophil % 0.5 %      Immature Grans % 0.6 %      Neutrophils, Absolute 4.42 10*3/mm3      Lymphocytes, Absolute 0.73 10*3/mm3      Monocytes, Absolute 1.11 10*3/mm3      Eosinophils, Absolute 0.17 10*3/mm3      Basophils, Absolute 0.03 10*3/mm3      Immature Grans, Absolute 0.04 10*3/mm3      nRBC 0.0 /100 WBC              Physician Progress Notes      Baldemar Griffin Jr., MD at 06/10/21 0942        Chief Complaint:    S/P Right hemicolectomy, POD 8    Subjective:    The patient is generally feeling well.  She is not having any nausea or vomiting but continues to have a very poor appetite.  She has passed some flatus and a bowel movement yesterday morning but no further bowel movement since that time.    Objective:    Temp:  [97 °F (36.1 °C)-97.9 °F (36.6 °C)] 97.2 °F (36.2 °C)  Heart Rate:  [60-85] 82  Resp:  [16] 16  BP: (137-148)/(70-84) 137/70    Physical Exam  Constitutional:       Appearance: She is not ill-appearing or toxic-appearing.   Abdominal:      Palpations: Abdomen is soft.      Tenderness: There is no abdominal tenderness.   Neurological:      Mental Status: She is alert.   Psychiatric:         Behavior: Behavior is cooperative.         Results:    WBC is 6.50.  H/H  is 9.2/27.4.    Impression/Plan:    The patient is POD 8 from a right hemicolectomy for a cecal cancer.  She continues to have an expected but prolonged postop ileus.  She will be given Dulcolax suppositories today to try to stimulate GI activity.    Continue TPN until bowel function improves.    Baldemar Griffin Jr., M.D.    Electronically signed by Baldemar Griffin Jr., MD at 06/10/21 0941     Wil Shields MD at 06/10/21 0918              Name: Jaqueline Rojas ADMIT: 2021   : 1924  PCP: Provider, No Known    MRN: [id number] LOS: 17 days   AGE/SEX: 89 y.o. female  ROOM: [facility]     Subjective   Subjective   Not much change.  Not eating much at all    Review of Systems   Gastrointestinal: Positive for abdominal distention and nausea.       Objective   Objective   Vital Signs  Temp:  [97 °F (36.1 °C)-97.9 °F (36.6 °C)] 97.2 °F (36.2 °C)  Heart Rate:  [60-85] 82  Resp:  [16] 16  BP: (137-148)/(70-84) 137/70  SpO2:  [97 %-98 %] 98 %  on   ;   Device (Oxygen Therapy): room air  Body mass index is 20.76 kg/m².  Physical Exam  Vitals and nursing note reviewed.   Constitutional:       Appearance: Normal appearance.   HENT:      Head: Normocephalic and atraumatic.   Cardiovascular:      Rate and Rhythm: Normal rate and regular rhythm.      Heart sounds: No murmur heard.     Pulmonary:      Effort: Pulmonary effort is normal.      Breath sounds: Normal breath sounds.   Abdominal:      General: There is distension.      Palpations: Abdomen is soft.      Tenderness: There is abdominal tenderness.      Comments: Hypoactive bowel sounds   Musculoskeletal:      Right lower leg: No edema.      Left lower leg: No edema.   Skin:     General: Skin is warm and dry.      Findings: No rash.   Neurological:      General: No focal deficit present.      Mental Status: She is alert and oriented to person, place, and time.   Psychiatric:         Mood and Affect: Mood normal.         Behavior: Behavior normal.          Results Review     I reviewed the patient's new clinical results.  Results from last 7 days   Lab Units 06/10/21  0433 06/09/21  0944 06/08/21 0635 06/07/21  0701   WBC 10*3/mm3 6.50 7.92 7.12 8.11   HEMOGLOBIN g/dL 9.2* 9.9* 10.0* 10.5*   PLATELETS 10*3/mm3 287 350 351 454*     Results from last 7 days   Lab Units 06/10/21  0433 06/09/21  0944 06/08/21  0635 06/07/21  0701   SODIUM mmol/L 133* 132* 133* 135*   POTASSIUM mmol/L 3.9 4.2 4.4 3.9   CHLORIDE mmol/L 100 100 102 101   CO2 mmol/L 25.5 23.6 26.2 26.5   BUN mg/dL 12 12 11 7*   CREATININE mg/dL 0.32* 0.39* 0.38* 0.46*   GLUCOSE mg/dL 169* 203* 174* 169*   Estimated Creatinine Clearance: 35.6 mL/min (A) (by C-G formula based on SCr of 0.32 mg/dL (L)).  Results from last 7 days   Lab Units 06/08/21 0635 06/07/21  0701 06/06/21  0654   ALBUMIN g/dL 2.70* 2.80* 2.20*   BILIRUBIN mg/dL 0.3 0.3  --    ALK PHOS U/L 44 43  --    AST (SGOT) U/L 13 13  --    ALT (SGPT) U/L 9 11  --      Results from last 7 days   Lab Units 06/10/21  0433 06/09/21  0944 06/08/21  0635 06/07/21  0701 06/06/21  0654   CALCIUM mg/dL 8.0* 8.0* 8.3 8.1* 7.8*   ALBUMIN g/dL  --   --  2.70* 2.80* 2.20*   MAGNESIUM mg/dL 2.0 2.0 2.0 1.9 2.0   PHOSPHORUS mg/dL 2.7 3.1 2.3* 2.7 1.9*       COVID19   Date Value Ref Range Status   06/01/2021 Not Detected Not Detected - Ref. Range Final   05/28/2021 Not Detected Not Detected - Ref. Range Final     No results found for: HGBA1C, POCGLU    XR Abdomen KUB  ONE VIEW PORTABLE ABDOMEN AT 10:38 AM     HISTORY: Recent abdominal surgery. Follow up of ileus.     FINDINGS: There is considerable bowel gas scattered in both large and  small bowel most consistent with postoperative ileus and showing no  change from 3 days ago. The gas-filled transverse colon measures up to  5.5 cm in diameter which is unchanged.     This report was finalized on 6/9/2021 1:07 PM by Dr. Ivan Johnson M.D.       Scheduled Medications  aspirin, 81 mg, Oral, Daily  digoxin,  0.5 mg, Intravenous, Once  famotidine, 20 mg, Intravenous, Daily  Fat Emul Fish Oil/Plant Based, 100 mL, Intravenous, Once per day on Mon Wed Fri  metoprolol tartrate, 12.5 mg, Oral, Q12H  multivitamin, 1 tablet, Oral, Daily  sodium chloride, 10 mL, Intravenous, Q12H  sodium chloride, 10 mL, Intravenous, Q12H  sodium chloride, 10 mL, Intravenous, Q12H    Infusions  Adult Central 2-in-1 TPN, , Last Rate: 60 mL/hr at 06/09/21 1803  Pharmacy to Dose TPN,     Diet  Diet Full Liquid  Adult Central 2-in-1 TPN      Assessment/Plan     Active Hospital Problems    Diagnosis  POA   • **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]  Yes   • 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    · Continue TPN and diet as tolerated  · Replacing electrolytes via TPN as needed  · Encouraged her to continue to ambulate with PT  · Following surgical recommendations  · SCDs for DVT prophylaxis.  · Limited code (no CPR, no intubation).  · Dispo: Eventual DC home planned when GI function returns and joe PO      Wil Shields MD  [facility]  06/10/21  09:19 EDT      Electronically signed by Wil Shields MD at 06/10/21 7062         Tiffanie De La Torre RN    Registered Nurse      Plan of Care   Signed   Date of Service:  06/10/21 0601   Creation Time:  06/10/21 0601            Signed             Goal Outcome Evaluation:  Outcome Summary: AOX4, no complaints of pain. TPN currently infusing, lipids completed. Abd distended and taut with midline incision from colonectomey. No further changes                    Anna Hope RN      Case Management   Case Management/Social Work   Signed   Date of Service:  06/09/21 1302   Creation Time:  06/09/21 1302            Signed             Continued Stay Note  [facility]     Patient Name: Jaqueilne Rojas                    MRN: [id number]  Today's Date: 6/9/2021                       Admit Date: 5/24/2021          Discharge Plan      Row Name 06/09/21 1301           Plan     Plan  Plans dc home with assist of daughter.     Patient/Family in Agreement with Plan  yes     Plan Comments  Discharge plans unchanged. Plans dc home with assist of daughter. Has a cane and grab bars in the bathroom. No PCP - given list of [facility] physicians. Per PT, patient needs a walker but no further acute PT needs (ambulated 300 ft with Rwx and SBA). Currently on TPN and lipids. No plan to continue at home? Family to transfer per private auto. Continue to follow.....PRC          Discharge Codes    No documentation.                  Anna Hope RN

## 2021-06-10 NOTE — PLAN OF CARE
Problem: Adult Inpatient Plan of Care  Goal: Plan of Care Review  Goal: Absence of Hospital-Acquired Illness or Injury  Intervention: Identify and Manage Fall Risk  Intervention: Prevent Skin Injury  Goal: Optimal Comfort and Wellbeing  Intervention: Provide Person-Centered Care     Problem: Pain Acute  Goal: Optimal Pain Control  Intervention: Develop Pain Management Plan  Intervention: Prevent or Manage Pain  Intervention: Optimize Psychosocial Wellbeing     Problem: Fall Injury Risk  Goal: Absence of Fall and Fall-Related Injury  Intervention: Identify and Manage Contributors to Fall Injury Risk  Intervention: Promote Injury-Free Environment     Problem: Skin Injury Risk Increased  Goal: Skin Health and Integrity  Intervention: Optimize Skin Protection   Goal Outcome Evaluation:              Outcome Summary: AOX4, no complaints of pain. TPN currently infusing, lipids completed. Abd distended and taut with midline incision from colonectomey. No further changes

## 2021-06-10 NOTE — PROGRESS NOTES
Chief Complaint:    S/P Right hemicolectomy, POD 8    Subjective:    The patient is generally feeling well.  She is not having any nausea or vomiting but continues to have a very poor appetite.  She has passed some flatus and a bowel movement yesterday morning but no further bowel movement since that time.    Objective:    Temp:  [97 °F (36.1 °C)-97.9 °F (36.6 °C)] 97.2 °F (36.2 °C)  Heart Rate:  [60-85] 82  Resp:  [16] 16  BP: (137-148)/(70-84) 137/70    Physical Exam  Constitutional:       Appearance: She is not ill-appearing or toxic-appearing.   Abdominal:      Palpations: Abdomen is soft.      Tenderness: There is no abdominal tenderness.   Neurological:      Mental Status: She is alert.   Psychiatric:         Behavior: Behavior is cooperative.         Results:    WBC is 6.50.  H/H is 9.2/27.4.    Impression/Plan:    The patient is POD 8 from a right hemicolectomy for a cecal cancer.  She continues to have an expected but prolonged postop ileus.  She will be given Dulcolax suppositories today to try to stimulate GI activity.    Continue TPN until bowel function improves.    Baldemar Griffin Jr., M.D.

## 2021-06-10 NOTE — PROGRESS NOTES
Adult Nutrition  Assessment/PES    Patient Name:  Jaqueline Rojas  YOB: 1924  MRN: [id number]  Admit Date:  5/24/2021    Assessment Date:  6/10/2021  Nutrition follow up.   Reason for Assessment     Row Name 06/10/21 1047          Reason for Assessment    Reason For Assessment  follow-up protocol;TF/PN         Nutrition/Diet History     Row Name 06/10/21 1047          Nutrition/Diet History    Typical Food/Fluid Intake  full liquids, boost TID-patient consumed bites of cream of wheat and some of boost this AM; TPN continues-65 gm protein, 1200 kcal dextrose, 20% 100 mL SMOF lipids 3x/week=1545 kcal           Labs/Tests/Procedures/Meds     Row Name 06/10/21 1050          Labs/Procedures/Meds    Lab Results Reviewed  reviewed, pertinent     Lab Results Comments  Glu, Na        Diagnostic Tests/Procedures    Diagnostic Test/Procedure Reviewed  reviewed, pertinent        Medications    Pertinent Medications Reviewed  reviewed, pertinent         Physical Findings     Row Name 06/10/21 1050          Physical Findings    Skin  surgical incision           Nutrition Prescription Ordered     Row Name 06/10/21 1051          Nutrition Prescription PO    Current PO Diet  Full Liquid     Supplement  Boost Plus (Ensure Enlive, Ensure Plus)     Supplement Frequency  3 times a day         Evaluation of Received Nutrient/Fluid Intake     Row Name 06/10/21 1051          PO Evaluation    Number of Meals  3     % PO Intake  25         Problem/Interventions:  Intervention Goal     Row Name 06/10/21 1051          Intervention Goal    General  Maintain nutrition;Nutrition support treatment;Reduce/improve symptoms     PO  Tolerate PO;Increase intake     TF/PN  Maintain TF/PN     Weight  Maintain weight         Nutrition Intervention     Row Name 06/10/21 1051          Nutrition Intervention    RD/Tech Action  Follow Tx progress;Care plan reviewd;Interview for preference;Encourage intake         Nutrition Prescription     Row  Name 06/10/21 1051          Nutrition Prescription PN    Parenteral Prescription  Continue same protocal         Education/Evaluation     Row Name 06/10/21 1051          Education    Education  Education topics;Will Instruct as appropriate        Monitor/Evaluation    Monitor  I&O;Per protocol;PO intake;Supplement intake;Pertinent labs;PN delivery/tolerance;Weight;Skin status;Symptoms           Electronically signed by:  Delaney Feliciano RD  06/10/21 10:52 EDT

## 2021-06-11 ENCOUNTER — APPOINTMENT (OUTPATIENT)
Dept: GENERAL RADIOLOGY | Facility: HOSPITAL | Age: 86
End: 2021-06-11

## 2021-06-11 LAB
ANION GAP SERPL CALCULATED.3IONS-SCNC: 3.7 MMOL/L (ref 5–15)
BASOPHILS # BLD AUTO: 0.02 10*3/MM3 (ref 0–0.2)
BASOPHILS NFR BLD AUTO: 0.3 % (ref 0–1.5)
BUN SERPL-MCNC: 16 MG/DL (ref 8–23)
BUN/CREAT SERPL: 48.5 (ref 7–25)
CALCIUM SPEC-SCNC: 7.8 MG/DL (ref 8.2–9.6)
CHLORIDE SERPL-SCNC: 103 MMOL/L (ref 98–107)
CO2 SERPL-SCNC: 24.3 MMOL/L (ref 22–29)
CREAT SERPL-MCNC: 0.33 MG/DL (ref 0.57–1)
DEPRECATED RDW RBC AUTO: 44.5 FL (ref 37–54)
EOSINOPHIL # BLD AUTO: 0.07 10*3/MM3 (ref 0–0.4)
EOSINOPHIL NFR BLD AUTO: 1.2 % (ref 0.3–6.2)
ERYTHROCYTE [DISTWIDTH] IN BLOOD BY AUTOMATED COUNT: 14.1 % (ref 12.3–15.4)
GFR SERPL CREATININE-BSD FRML MDRD: >150 ML/MIN/1.73
GLUCOSE SERPL-MCNC: 175 MG/DL (ref 65–99)
HCT VFR BLD AUTO: 26.1 % (ref 34–46.6)
HGB BLD-MCNC: 8.6 G/DL (ref 12–15.9)
IMM GRANULOCYTES # BLD AUTO: 0.02 10*3/MM3 (ref 0–0.05)
IMM GRANULOCYTES NFR BLD AUTO: 0.3 % (ref 0–0.5)
LYMPHOCYTES # BLD AUTO: 0.49 10*3/MM3 (ref 0.7–3.1)
LYMPHOCYTES NFR BLD AUTO: 8.2 % (ref 19.6–45.3)
MAGNESIUM SERPL-MCNC: 1.3 MG/DL (ref 1.7–2.3)
MCH RBC QN AUTO: 28.7 PG (ref 26.6–33)
MCHC RBC AUTO-ENTMCNC: 33 G/DL (ref 31.5–35.7)
MCV RBC AUTO: 87 FL (ref 79–97)
MONOCYTES # BLD AUTO: 0.54 10*3/MM3 (ref 0.1–0.9)
MONOCYTES NFR BLD AUTO: 9 % (ref 5–12)
NEUTROPHILS NFR BLD AUTO: 4.87 10*3/MM3 (ref 1.7–7)
NEUTROPHILS NFR BLD AUTO: 81 % (ref 42.7–76)
NRBC BLD AUTO-RTO: 0 /100 WBC (ref 0–0.2)
PHOSPHATE SERPL-MCNC: 2.5 MG/DL (ref 2.5–4.5)
PLATELET # BLD AUTO: 247 10*3/MM3 (ref 140–450)
PMV BLD AUTO: 8.6 FL (ref 6–12)
POTASSIUM SERPL-SCNC: 3.9 MMOL/L (ref 3.5–5.2)
RBC # BLD AUTO: 3 10*6/MM3 (ref 3.77–5.28)
SODIUM SERPL-SCNC: 131 MMOL/L (ref 136–145)
WBC # BLD AUTO: 6.01 10*3/MM3 (ref 3.4–10.8)

## 2021-06-11 PROCEDURE — 74018 RADEX ABDOMEN 1 VIEW: CPT

## 2021-06-11 PROCEDURE — 84100 ASSAY OF PHOSPHORUS: CPT | Performed by: SURGERY

## 2021-06-11 PROCEDURE — 25010000002 CALCIUM GLUCONATE PER 10 ML: Performed by: SURGERY

## 2021-06-11 PROCEDURE — 25010000002 MAGNESIUM SULFATE PER 500 MG OF MAGNESIUM: Performed by: SURGERY

## 2021-06-11 PROCEDURE — 80048 BASIC METABOLIC PNL TOTAL CA: CPT | Performed by: INTERNAL MEDICINE

## 2021-06-11 PROCEDURE — 83735 ASSAY OF MAGNESIUM: CPT | Performed by: SURGERY

## 2021-06-11 PROCEDURE — 85025 COMPLETE CBC W/AUTO DIFF WBC: CPT | Performed by: INTERNAL MEDICINE

## 2021-06-11 PROCEDURE — 99024 POSTOP FOLLOW-UP VISIT: CPT | Performed by: SURGERY

## 2021-06-11 PROCEDURE — 93005 ELECTROCARDIOGRAM TRACING: CPT | Performed by: HOSPITALIST

## 2021-06-11 PROCEDURE — 93010 ELECTROCARDIOGRAM REPORT: CPT | Performed by: INTERNAL MEDICINE

## 2021-06-11 PROCEDURE — 25010000002 POTASSIUM CHLORIDE PER 2 MEQ OF POTASSIUM: Performed by: SURGERY

## 2021-06-11 RX ORDER — BISACODYL 10 MG
10 SUPPOSITORY, RECTAL RECTAL DAILY
Status: DISCONTINUED | OUTPATIENT
Start: 2021-06-11 | End: 2021-06-14 | Stop reason: HOSPADM

## 2021-06-11 RX ORDER — CARVEDILOL 3.12 MG/1
3.12 TABLET ORAL 2 TIMES DAILY WITH MEALS
Status: DISCONTINUED | OUTPATIENT
Start: 2021-06-11 | End: 2021-06-14 | Stop reason: HOSPADM

## 2021-06-11 RX ADMIN — ASPIRIN 81 MG: 81 TABLET, COATED ORAL at 20:45

## 2021-06-11 RX ADMIN — SODIUM CHLORIDE, PRESERVATIVE FREE 10 ML: 5 INJECTION INTRAVENOUS at 10:24

## 2021-06-11 RX ADMIN — SODIUM CHLORIDE, PRESERVATIVE FREE 10 ML: 5 INJECTION INTRAVENOUS at 21:02

## 2021-06-11 RX ADMIN — METOPROLOL TARTRATE 12.5 MG: 25 TABLET, FILM COATED ORAL at 10:24

## 2021-06-11 RX ADMIN — SMOFLIPID 100 ML: 6; 6; 5; 3 INJECTION, EMULSION INTRAVENOUS at 20:33

## 2021-06-11 RX ADMIN — FAMOTIDINE 20 MG: 10 INJECTION INTRAVENOUS at 10:23

## 2021-06-11 RX ADMIN — Medication 1 TABLET: at 10:24

## 2021-06-11 RX ADMIN — POTASSIUM CHLORIDE: 2 INJECTION, SOLUTION, CONCENTRATE INTRAVENOUS at 20:33

## 2021-06-11 RX ADMIN — BISACODYL 10 MG: 10 SUPPOSITORY RECTAL at 20:45

## 2021-06-11 RX ADMIN — ACETAMINOPHEN 650 MG: 325 TABLET, FILM COATED ORAL at 16:30

## 2021-06-11 RX ADMIN — CARVEDILOL 3.12 MG: 3.12 TABLET, FILM COATED ORAL at 22:54

## 2021-06-11 RX ADMIN — SODIUM CHLORIDE, PRESERVATIVE FREE 10 ML: 5 INJECTION INTRAVENOUS at 21:01

## 2021-06-11 NOTE — PROGRESS NOTES
Chief Complaint:    S/P Right hemicolectomy, POD 9    Subjective:    The patient is having some epigastric abdominal pain and increased abdominal distention.  She had some nausea last night but none today.  There has not been any vomiting.  She has passed bowel movements but they are small.  Abdominal x-rays today show an ongoing ileus.    Objective:    Temp:  [97 °F (36.1 °C)-98.7 °F (37.1 °C)] 97.1 °F (36.2 °C)  Heart Rate:  [76-97] 97  Resp:  [16-18] 16  BP: ()/(52-65) 111/65    Physical Exam  Constitutional:       Appearance: She is not ill-appearing or toxic-appearing.   Abdominal:      General: Abdomen is protuberant. There is distension.      Palpations: Abdomen is soft.      Tenderness: There is generalized abdominal tenderness (Appropriate postop tenderness).   Neurological:      Mental Status: She is alert.   Psychiatric:         Behavior: Behavior is cooperative.         Results:    WBC is 6.01.  H/H is 8.6/26.1.    Impression/Plan:    The patient is POD 9 from a right hemicolectomy for a cecal cancer.  She continues to have expected postop ileus.  We will change the Dulcolax suppository from as needed to daily to try to stimulate GI activity.    Baldemar Griffin Jr., M.D.

## 2021-06-11 NOTE — PLAN OF CARE
Goal Outcome Evaluation:  Plan of Care Reviewed With: patient        Progress: no change  Outcome Summary: Patient tolerating full liquid diet. Passing flatus, 2 bowel movements yesterday. TPN infusing. Ambulating with family. VSS. CTM.

## 2021-06-11 NOTE — PROGRESS NOTES
"Pharmacy to dose TPN - Daily Progress Note  Patient: Jaqueline Rojas  MRN#: [id number]  Admission Date: 524    TPN #6 per Dr. Paez  Indication:Prolonged Paralytic Ileus  S/P Right hemicolectomy, POD 8    Principal Problem:    Cecum mass  Active Problems:    PSVT (paroxysmal supraventricular tachycardia) (CMS/HCC)    Mitral valve regurgitation    Hyperglycemia    Anemia    Superior mesenteric artery stenosis (CMS/HCC)    Iron deficiency anemia due to chronic blood loss    Hyponatremia    Cecal cancer (CMS/HCC)    Postoperative ileus (CMS/HCC)    Subjective/Objective  89 y.o. female 162.6 cm (64.02\") 54.9 kg (121 lb)  Results from last 7 days   Lab Units 21  0602 21  0450 21  0635 21  0701   SODIUM mmol/L 131*  --  133* 135*   POTASSIUM mmol/L 3.9  --  4.4 3.9   CHLORIDE mmol/L 103  --  102 101   CO2 mmol/L 24.3  --  26.2 26.5   BUN mg/dL 16  --  11 7*   CREATININE mg/dL 0.33*  --  0.38* 0.46*   CALCIUM mg/dL 7.8*  --  8.3 8.1*   ALBUMIN g/dL  --   --  2.70* 2.80*   BILIRUBIN mg/dL  --   --  0.3 0.3   ALK PHOS U/L  --   --  44 43   ALT (SGPT) U/L  --   --  9 11   AST (SGOT) U/L  --   --  13 13   GLUCOSE mg/dL 175*  --  174* 169*   MAGNESIUM mg/dL  --  1.3* 2.0 1.9   PHOSPHORUS mg/dL 2.5  --  2.3* 2.7   TRIGLYCERIDES mg/dL  --   --   --  96    Corrected calcium for albumin = 9.34  I/O last 3 completed shifts:  In: -   Out: 1200 [Urine:1200]    Diet Orders (active) (From admission, onward)     Start     Ordered    21  Diet Full Liquid  Diet Effective Now      21 09              Additional insulin administration while previous TPN infusin units  Additional electrolyte administration while previous TPN infusing: none  Acid suppression: Famotidine 20mg IV q24h    Dietitian recommendations ()  Till po established, rec 1200 dextrose, 65gm AA, and 20% 100ml lipids.    Daily Assessment  Current macronutrients: protein 65 gm/day, dextrose 1200 kcal/day, lipids 200 kcal MWF " utilizing SMOFlipids  Fluid rate: 60 mL/hr    Plan      Patient continues on full liquid diet with supplements although limited PO intake. Sodium continues to decline - will increase in TPN as NaPhos    Goal macros at 65g of protein, 1200kacls of dextrose, smof lipids MWF  Electrolytes as follows.     Based on the above labs and current dietary intake, will add the following electrolytes/additives to the TPN.               Sodium Chloride:120 mEq               Sodium Phosphate: 30 mEq (increased)              Potassium Chloride: 30 mEq               Potassium Phosphate: 22mEq              Calcium Gluconate: 9 mEq              Magnesium Sulfate: 10 mEq              Multivitamin: none - getting PO MVI                   Trace Elements: 1mL     Labs to be ordered: CMP, Mag, and Phos     Pharmacy will continue to follow.     Mala Flores, Pharm.D., [facility]  Oncology Pharmacy Specialist  [phone]

## 2021-06-11 NOTE — PLAN OF CARE
Problem: Adult Inpatient Plan of Care  Goal: Plan of Care Review  Goal: Absence of Hospital-Acquired Illness or Injury  Intervention: Identify and Manage Fall Risk  Intervention: Prevent Skin Injury  Goal: Optimal Comfort and Wellbeing  Intervention: Provide Person-Centered Care     Problem: Pain Acute  Goal: Optimal Pain Control  Intervention: Develop Pain Management Plan  Intervention: Prevent or Manage Pain  Intervention: Optimize Psychosocial Wellbeing     Problem: Fall Injury Risk  Goal: Absence of Fall and Fall-Related Injury  Intervention: Identify and Manage Contributors to Fall Injury Risk  Intervention: Promote Injury-Free Environment     Problem: Skin Injury Risk Increased  Goal: Skin Health and Integrity  Intervention: Optimize Skin Protection   Goal Outcome Evaluation:              Outcome Summary: PICC with TPN in infusing. Norco given at the beginning of shift with no other complaints of pain throughout the night. Patient's abdomen is still distended but states she's had BM yesterday. No further changes

## 2021-06-11 NOTE — PROGRESS NOTES
Name: Jaqueline Rojas ADMIT: 2021   : 1924  PCP: Provider, No Known    MRN: [id number] LOS: 18 days   AGE/SEX: 89 y.o. female  ROOM: [facility]     Subjective   Subjective   Says her pain has been worse over the last 24 hours.  Mostly points to her epigastric area.  Family says that she has not been urinating as much    Review of Systems   Gastrointestinal: Positive for abdominal distention and nausea.        Objective   Objective   Vital Signs  Temp:  [97 °F (36.1 °C)-98.7 °F (37.1 °C)] 98.7 °F (37.1 °C)  Heart Rate:  [76-86] 86  Resp:  [16-18] 18  BP: ()/(52-62) 101/56  SpO2:  [95 %-97 %] 95 %  on   ;   Device (Oxygen Therapy): room air  Body mass index is 20.76 kg/m².  Physical Exam  Vitals and nursing note reviewed.   Constitutional:       Appearance: Normal appearance.   HENT:      Head: Normocephalic and atraumatic.   Cardiovascular:      Rate and Rhythm: Normal rate and regular rhythm.      Heart sounds: No murmur heard.     Pulmonary:      Effort: Pulmonary effort is normal.      Breath sounds: Normal breath sounds.   Abdominal:      General: There is distension.      Palpations: Abdomen is soft.      Tenderness: There is abdominal tenderness.      Comments: Hypoactive bowel sounds   Musculoskeletal:      Right lower leg: No edema.      Left lower leg: No edema.   Skin:     General: Skin is warm and dry.      Findings: No rash.   Neurological:      General: No focal deficit present.      Mental Status: She is alert and oriented to person, place, and time.   Psychiatric:         Mood and Affect: Mood normal.         Behavior: Behavior normal.         Results Review     I reviewed the patient's new clinical results.  Results from last 7 days   Lab Units 21  0450 06/10/21  0433 21  0944 21  0635   WBC 10*3/mm3 6.01 6.50 7.92 7.12   HEMOGLOBIN g/dL 8.6* 9.2* 9.9* 10.0*   PLATELETS 10*3/mm3 247 287 350 351     Results from last 7 days   Lab Units 21  0602 06/10/21  0433  06/09/21  0944 06/08/21  0635   SODIUM mmol/L 131* 133* 132* 133*   POTASSIUM mmol/L 3.9 3.9 4.2 4.4   CHLORIDE mmol/L 103 100 100 102   CO2 mmol/L 24.3 25.5 23.6 26.2   BUN mg/dL 16 12 12 11   CREATININE mg/dL 0.33* 0.32* 0.39* 0.38*   GLUCOSE mg/dL 175* 169* 203* 174*   Estimated Creatinine Clearance: 35.6 mL/min (A) (by C-G formula based on SCr of 0.33 mg/dL (L)).  Results from last 7 days   Lab Units 06/08/21  0635 06/07/21  0701 06/06/21  0654   ALBUMIN g/dL 2.70* 2.80* 2.20*   BILIRUBIN mg/dL 0.3 0.3  --    ALK PHOS U/L 44 43  --    AST (SGOT) U/L 13 13  --    ALT (SGPT) U/L 9 11  --      Results from last 7 days   Lab Units 06/11/21  0602 06/11/21  0450 06/10/21  0433 06/09/21  0944 06/08/21  0635 06/07/21  0701 06/06/21  0654   CALCIUM mg/dL 7.8*  --  8.0* 8.0* 8.3 8.1* 7.8*   ALBUMIN g/dL  --   --   --   --  2.70* 2.80* 2.20*   MAGNESIUM mg/dL  --  1.3* 2.0 2.0 2.0 1.9 2.0   PHOSPHORUS mg/dL 2.5  --  2.7 3.1 2.3* 2.7 1.9*       COVID19   Date Value Ref Range Status   06/01/2021 Not Detected Not Detected - Ref. Range Final   05/28/2021 Not Detected Not Detected - Ref. Range Final     No results found for: HGBA1C, POCGLU    XR Abdomen KUB  ONE VIEW PORTABLE ABDOMEN AT 10:38 AM     HISTORY: Recent abdominal surgery. Follow up of ileus.     FINDINGS: There is considerable bowel gas scattered in both large and  small bowel most consistent with postoperative ileus and showing no  change from 3 days ago. The gas-filled transverse colon measures up to  5.5 cm in diameter which is unchanged.     This report was finalized on 6/9/2021 1:07 PM by Dr. Ivan Johnson M.D.       Scheduled Medications  aspirin, 81 mg, Oral, Daily  digoxin, 0.5 mg, Intravenous, Once  famotidine, 20 mg, Intravenous, Daily  Fat Emul Fish Oil/Plant Based, 100 mL, Intravenous, Once per day on Mon Wed Fri  metoprolol tartrate, 12.5 mg, Oral, Q12H  multivitamin, 1 tablet, Oral, Daily  sodium chloride, 10 mL, Intravenous, Q12H  sodium chloride, 10  mL, Intravenous, Q12H  sodium chloride, 10 mL, Intravenous, Q12H    Infusions  Adult Central 2-in-1 TPN, , Last Rate: 60 mL/hr at 06/10/21 1810  Adult Central 2-in-1 TPN,   Pharmacy to Dose TPN,     Diet  Diet Full Liquid  Adult Central 2-in-1 TPN  Adult Central 2-in-1 TPN       Assessment/Plan     Active Hospital Problems    Diagnosis  POA   • **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]  Yes   • 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    · Pain seems to be worse over the last 24 hours.We will recheck KUB again though at last check just showed persistent ileus and I suspect will continue to do so  · Check postvoid residual as family has noted less urine output and this could also cause some discomfort and distention  · Continue TPN and diet as tolerated  · Pharmacy to replace electrolytes via TPN as needed  · Encouraged her to continue to ambulate with PT  · SCDs for DVT prophylaxis.  · Limited code (no CPR, no intubation).  · Dispo: Eventual DC home planned when GI function returns and joe PO      Wil Shields MD  [facility]  06/11/21  17:08 EDT

## 2021-06-12 LAB
ALBUMIN SERPL-MCNC: 2.5 G/DL (ref 3.5–5.2)
ALBUMIN/GLOB SERPL: 1 G/DL
ALP SERPL-CCNC: 53 U/L (ref 39–117)
ALT SERPL W P-5'-P-CCNC: 8 U/L (ref 1–33)
ANION GAP SERPL CALCULATED.3IONS-SCNC: 5 MMOL/L (ref 5–15)
AST SERPL-CCNC: 12 U/L (ref 1–32)
BASOPHILS # BLD AUTO: 0.01 10*3/MM3 (ref 0–0.2)
BASOPHILS NFR BLD AUTO: 0.2 % (ref 0–1.5)
BILIRUB SERPL-MCNC: 0.2 MG/DL (ref 0–1.2)
BUN SERPL-MCNC: 13 MG/DL (ref 8–23)
BUN/CREAT SERPL: 39.4 (ref 7–25)
CALCIUM SPEC-SCNC: 7.8 MG/DL (ref 8.2–9.6)
CHLORIDE SERPL-SCNC: 100 MMOL/L (ref 98–107)
CO2 SERPL-SCNC: 26 MMOL/L (ref 22–29)
CREAT SERPL-MCNC: 0.33 MG/DL (ref 0.57–1)
DEPRECATED RDW RBC AUTO: 41.6 FL (ref 37–54)
EOSINOPHIL # BLD AUTO: 0.12 10*3/MM3 (ref 0–0.4)
EOSINOPHIL NFR BLD AUTO: 2.6 % (ref 0.3–6.2)
ERYTHROCYTE [DISTWIDTH] IN BLOOD BY AUTOMATED COUNT: 13.6 % (ref 12.3–15.4)
GFR SERPL CREATININE-BSD FRML MDRD: >150 ML/MIN/1.73
GLOBULIN UR ELPH-MCNC: 2.6 GM/DL
GLUCOSE SERPL-MCNC: 198 MG/DL (ref 65–99)
HCT VFR BLD AUTO: 21.1 % (ref 34–46.6)
HCT VFR BLD AUTO: 24.6 % (ref 34–46.6)
HGB BLD-MCNC: 7 G/DL (ref 12–15.9)
HGB BLD-MCNC: 8.1 G/DL (ref 12–15.9)
IMM GRANULOCYTES # BLD AUTO: 0.03 10*3/MM3 (ref 0–0.05)
IMM GRANULOCYTES NFR BLD AUTO: 0.6 % (ref 0–0.5)
LYMPHOCYTES # BLD AUTO: 0.49 10*3/MM3 (ref 0.7–3.1)
LYMPHOCYTES NFR BLD AUTO: 10.6 % (ref 19.6–45.3)
MAGNESIUM SERPL-MCNC: 1.8 MG/DL (ref 1.7–2.3)
MCH RBC QN AUTO: 28.1 PG (ref 26.6–33)
MCHC RBC AUTO-ENTMCNC: 33.2 G/DL (ref 31.5–35.7)
MCV RBC AUTO: 84.7 FL (ref 79–97)
MONOCYTES # BLD AUTO: 0.89 10*3/MM3 (ref 0.1–0.9)
MONOCYTES NFR BLD AUTO: 19.3 % (ref 5–12)
NEUTROPHILS NFR BLD AUTO: 3.08 10*3/MM3 (ref 1.7–7)
NEUTROPHILS NFR BLD AUTO: 66.7 % (ref 42.7–76)
NRBC BLD AUTO-RTO: 0 /100 WBC (ref 0–0.2)
PHOSPHATE SERPL-MCNC: 2.5 MG/DL (ref 2.5–4.5)
PLATELET # BLD AUTO: 201 10*3/MM3 (ref 140–450)
PMV BLD AUTO: 8.8 FL (ref 6–12)
POTASSIUM SERPL-SCNC: 3.9 MMOL/L (ref 3.5–5.2)
PROT SERPL-MCNC: 5.1 G/DL (ref 6–8.5)
QT INTERVAL: 363 MS
RBC # BLD AUTO: 2.49 10*6/MM3 (ref 3.77–5.28)
SODIUM SERPL-SCNC: 131 MMOL/L (ref 136–145)
WBC # BLD AUTO: 4.62 10*3/MM3 (ref 3.4–10.8)

## 2021-06-12 PROCEDURE — 99024 POSTOP FOLLOW-UP VISIT: CPT | Performed by: SURGERY

## 2021-06-12 PROCEDURE — 85014 HEMATOCRIT: CPT | Performed by: HOSPITALIST

## 2021-06-12 PROCEDURE — 83735 ASSAY OF MAGNESIUM: CPT | Performed by: SURGERY

## 2021-06-12 PROCEDURE — 80053 COMPREHEN METABOLIC PANEL: CPT | Performed by: SURGERY

## 2021-06-12 PROCEDURE — 84100 ASSAY OF PHOSPHORUS: CPT | Performed by: SURGERY

## 2021-06-12 PROCEDURE — 85025 COMPLETE CBC W/AUTO DIFF WBC: CPT | Performed by: INTERNAL MEDICINE

## 2021-06-12 PROCEDURE — 85018 HEMOGLOBIN: CPT | Performed by: HOSPITALIST

## 2021-06-12 RX ORDER — PANTOPRAZOLE SODIUM 40 MG/1
40 TABLET, DELAYED RELEASE ORAL
Status: DISCONTINUED | OUTPATIENT
Start: 2021-06-13 | End: 2021-06-14 | Stop reason: HOSPADM

## 2021-06-12 RX ADMIN — ACETAMINOPHEN 650 MG: 325 TABLET, FILM COATED ORAL at 17:12

## 2021-06-12 RX ADMIN — SODIUM CHLORIDE, PRESERVATIVE FREE 10 ML: 5 INJECTION INTRAVENOUS at 21:23

## 2021-06-12 RX ADMIN — Medication 1 TABLET: at 08:23

## 2021-06-12 RX ADMIN — SODIUM CHLORIDE, PRESERVATIVE FREE 10 ML: 5 INJECTION INTRAVENOUS at 21:24

## 2021-06-12 RX ADMIN — SODIUM CHLORIDE, PRESERVATIVE FREE 10 ML: 5 INJECTION INTRAVENOUS at 08:23

## 2021-06-12 RX ADMIN — FAMOTIDINE 20 MG: 10 INJECTION INTRAVENOUS at 08:22

## 2021-06-12 RX ADMIN — ASPIRIN 81 MG: 81 TABLET, COATED ORAL at 21:23

## 2021-06-12 RX ADMIN — CARVEDILOL 3.12 MG: 3.12 TABLET, FILM COATED ORAL at 08:23

## 2021-06-12 RX ADMIN — CARVEDILOL 3.12 MG: 3.12 TABLET, FILM COATED ORAL at 17:12

## 2021-06-12 NOTE — PLAN OF CARE
Goal Outcome Evaluation:  Plan of Care Reviewed With: patient, daughter        Progress: improving  Outcome Summary: pt has had 2 bm's today, diet chgd to gi soft, TPN dc/d, walked in halls with daughter, picc flushes and has good blood return, continue to monitor

## 2021-06-12 NOTE — PROGRESS NOTES
Chief Complaint:    S/P Right hemicolectomy, POD 10    Subjective:    The patient has mild epigastric abdominal pain still.  She is not having any nausea or vomiting.  She continues to pass flatus and had a bowel movement.    Objective:    Temp:  [96.7 °F (35.9 °C)-98.7 °F (37.1 °C)] 97 °F (36.1 °C)  Heart Rate:  [75-97] 75  Resp:  [16-18] 16  BP: (101-125)/(55-68) 125/55    Physical Exam  Constitutional:       Appearance: She is not ill-appearing or toxic-appearing.   Abdominal:      General: Bowel sounds are normal. There is distension.      Palpations: Abdomen is soft.      Tenderness: There is no abdominal tenderness.   Neurological:      Mental Status: She is alert.   Psychiatric:         Behavior: Behavior is cooperative.         Results:    WBC is 4.62.  H/H is 8.1/24.6.  Albumin is 2.50.    Impression/Plan:    The patient is POD 10 from a right hemicolectomy for cecal cancer.  She continues to have an expected postop ileus but it is clinically resolving.  She has a distended abdomen but she states that this is her baseline.  Her TPN will be weaned off and we will try to advance her diet.    She has several days of epigastric abdominal pain and the Pepcid will be changed to Protonix for improved acid blockage.    She was encouraged to continue to increase her activity.    Baldemar Griffin Jr., M.D.

## 2021-06-12 NOTE — PROGRESS NOTES
Name: Jaqueline Rojas ADMIT: 2021   : 1924  PCP: Provider, No Known    MRN: [id number] LOS: 19 days   AGE/SEX: 89 y.o. female  ROOM: [facility]     Subjective   Subjective   Continued epigastric pain.  She did have a couple bowel movements after the bisacodyl depository    Review of Systems   Gastrointestinal: Positive for abdominal distention. Negative for nausea.        Objective   Objective   Vital Signs  Temp:  [96.7 °F (35.9 °C)-98.7 °F (37.1 °C)] 97 °F (36.1 °C)  Heart Rate:  [75-97] 75  Resp:  [16-18] 16  BP: (101-125)/(55-68) 125/55  SpO2:  [95 %-97 %] 97 %  on   ;   Device (Oxygen Therapy): room air  Body mass index is 20.76 kg/m².  Physical Exam  Vitals and nursing note reviewed.   Constitutional:       Appearance: Normal appearance.   HENT:      Head: Normocephalic and atraumatic.   Cardiovascular:      Rate and Rhythm: Normal rate and regular rhythm.      Heart sounds: No murmur heard.     Pulmonary:      Effort: Pulmonary effort is normal.      Breath sounds: Normal breath sounds.   Abdominal:      General: There is distension.      Palpations: Abdomen is soft.      Tenderness: There is abdominal tenderness.      Comments: Hypoactive bowel sounds   Musculoskeletal:      Right lower leg: No edema.      Left lower leg: No edema.   Skin:     General: Skin is warm and dry.      Findings: No rash.   Neurological:      General: No focal deficit present.      Mental Status: She is alert and oriented to person, place, and time.   Psychiatric:         Mood and Affect: Mood normal.         Behavior: Behavior normal.         Results Review     I reviewed the patient's new clinical results.  Results from last 7 days   Lab Units 21  0749 21  0514 21  0450 06/10/21  0433 21  0944   WBC 10*3/mm3  --  4.62 6.01 6.50 7.92   HEMOGLOBIN g/dL 8.1* 7.0* 8.6* 9.2* 9.9*   PLATELETS 10*3/mm3  --  201 247 287 350     Results from last 7 days   Lab Units 21  0514 21  0602  06/10/21  0433 06/09/21  0944   SODIUM mmol/L 131* 131* 133* 132*   POTASSIUM mmol/L 3.9 3.9 3.9 4.2   CHLORIDE mmol/L 100 103 100 100   CO2 mmol/L 26.0 24.3 25.5 23.6   BUN mg/dL 13 16 12 12   CREATININE mg/dL 0.33* 0.33* 0.32* 0.39*   GLUCOSE mg/dL 198* 175* 169* 203*   Estimated Creatinine Clearance: 35.6 mL/min (A) (by C-G formula based on SCr of 0.33 mg/dL (L)).  Results from last 7 days   Lab Units 06/12/21  0514 06/08/21  0635 06/07/21  0701 06/06/21  0654   ALBUMIN g/dL 2.50* 2.70* 2.80* 2.20*   BILIRUBIN mg/dL 0.2 0.3 0.3  --    ALK PHOS U/L 53 44 43  --    AST (SGOT) U/L 12 13 13  --    ALT (SGPT) U/L 8 9 11  --      Results from last 7 days   Lab Units 06/12/21  0514 06/11/21  0602 06/11/21  0450 06/10/21  0433 06/09/21  0944 06/08/21  0635 06/07/21  0701 06/06/21  0654   CALCIUM mg/dL 7.8* 7.8*  --  8.0* 8.0* 8.3 8.1* 7.8*   ALBUMIN g/dL 2.50*  --   --   --   --  2.70* 2.80* 2.20*   MAGNESIUM mg/dL 1.8  --  1.3* 2.0 2.0 2.0 1.9 2.0   PHOSPHORUS mg/dL 2.5 2.5  --  2.7 3.1 2.3* 2.7 1.9*       COVID19   Date Value Ref Range Status   06/01/2021 Not Detected Not Detected - Ref. Range Final   05/28/2021 Not Detected Not Detected - Ref. Range Final     No results found for: HGBA1C, POCGLU    XR Abdomen KUB  ABDOMEN KUB     CLINICAL HISTORY: Abdominal distention. Worsening abdominal pain. Postop  resection of cecal carcinoma.     Compared to the previous abdomen KUB dated 06/09/2021.     Again seen are multiple segments of mildly dilated air are filled small  and large bowel within the abdomen and pelvis consistent with adynamic  ileus. The overall degree of bowel distention is unchanged. There is no  obvious free air on these supine views. Surgical skin staples are noted  along the midline.     This report was finalized on 6/11/2021 7:24 PM by Dr. Anil Arthur M.D.       Scheduled Medications  aspirin, 81 mg, Oral, Daily  bisacodyl, 10 mg, Rectal, Daily  carvedilol, 3.125 mg, Oral, BID With Meals  digoxin,  0.5 mg, Intravenous, Once  famotidine, 20 mg, Intravenous, Daily  Fat Emul Fish Oil/Plant Based, 100 mL, Intravenous, Once per day on Mon Wed Fri  multivitamin, 1 tablet, Oral, Daily  sodium chloride, 10 mL, Intravenous, Q12H  sodium chloride, 10 mL, Intravenous, Q12H  sodium chloride, 10 mL, Intravenous, Q12H    Infusions  Adult Central 2-in-1 TPN, , Last Rate: 60 mL/hr at 06/11/21 2033  Pharmacy to Dose TPN,     Diet  Diet Full Liquid  Adult Central 2-in-1 TPN       Assessment/Plan     Active Hospital Problems    Diagnosis  POA   • **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]  Yes   • 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    · Two bowel movements are promising.  Hopefully we are nearing the end of this ileus.  Continue bisacodyl  · Told her daughter and nursing that she could try some applesauce.  Further diet advancement per surgery  · Weaning TPN.  Monitor intake and electrolytes  · Encouraged her to continue to ambulate with PT/family.  · Early morning labs were likely in error with hemoglobin of seven.  Repeat was back in line with her previous readings.  Continue to monitor  · SCDs for DVT prophylaxis.  · Limited code (no CPR, no intubation).  · Dispo:  Eventual DC home planned when GI function returns and joe PO      Wil Shields MD  [facility]  06/12/21  11:01 EDT

## 2021-06-13 LAB
ANION GAP SERPL CALCULATED.3IONS-SCNC: 3.9 MMOL/L (ref 5–15)
BASOPHILS # BLD AUTO: 0.02 10*3/MM3 (ref 0–0.2)
BASOPHILS NFR BLD AUTO: 0.4 % (ref 0–1.5)
BUN SERPL-MCNC: 12 MG/DL (ref 8–23)
BUN/CREAT SERPL: 36.4 (ref 7–25)
CALCIUM SPEC-SCNC: 8.1 MG/DL (ref 8.2–9.6)
CHLORIDE SERPL-SCNC: 101 MMOL/L (ref 98–107)
CO2 SERPL-SCNC: 28.1 MMOL/L (ref 22–29)
CREAT SERPL-MCNC: 0.33 MG/DL (ref 0.57–1)
DEPRECATED RDW RBC AUTO: 42.1 FL (ref 37–54)
EOSINOPHIL # BLD AUTO: 0.19 10*3/MM3 (ref 0–0.4)
EOSINOPHIL NFR BLD AUTO: 3.9 % (ref 0.3–6.2)
ERYTHROCYTE [DISTWIDTH] IN BLOOD BY AUTOMATED COUNT: 13.7 % (ref 12.3–15.4)
GFR SERPL CREATININE-BSD FRML MDRD: >150 ML/MIN/1.73
GLUCOSE SERPL-MCNC: 107 MG/DL (ref 65–99)
HCT VFR BLD AUTO: 25.2 % (ref 34–46.6)
HGB BLD-MCNC: 8.3 G/DL (ref 12–15.9)
IMM GRANULOCYTES # BLD AUTO: 0.03 10*3/MM3 (ref 0–0.05)
IMM GRANULOCYTES NFR BLD AUTO: 0.6 % (ref 0–0.5)
LYMPHOCYTES # BLD AUTO: 0.82 10*3/MM3 (ref 0.7–3.1)
LYMPHOCYTES NFR BLD AUTO: 16.7 % (ref 19.6–45.3)
MCH RBC QN AUTO: 27.7 PG (ref 26.6–33)
MCHC RBC AUTO-ENTMCNC: 32.9 G/DL (ref 31.5–35.7)
MCV RBC AUTO: 84 FL (ref 79–97)
MONOCYTES # BLD AUTO: 0.78 10*3/MM3 (ref 0.1–0.9)
MONOCYTES NFR BLD AUTO: 15.9 % (ref 5–12)
NEUTROPHILS NFR BLD AUTO: 3.07 10*3/MM3 (ref 1.7–7)
NEUTROPHILS NFR BLD AUTO: 62.5 % (ref 42.7–76)
NRBC BLD AUTO-RTO: 0 /100 WBC (ref 0–0.2)
PLATELET # BLD AUTO: 236 10*3/MM3 (ref 140–450)
PMV BLD AUTO: 9.9 FL (ref 6–12)
POTASSIUM SERPL-SCNC: 4.2 MMOL/L (ref 3.5–5.2)
RBC # BLD AUTO: 3 10*6/MM3 (ref 3.77–5.28)
SODIUM SERPL-SCNC: 133 MMOL/L (ref 136–145)
WBC # BLD AUTO: 4.91 10*3/MM3 (ref 3.4–10.8)

## 2021-06-13 PROCEDURE — 85025 COMPLETE CBC W/AUTO DIFF WBC: CPT | Performed by: INTERNAL MEDICINE

## 2021-06-13 PROCEDURE — 80048 BASIC METABOLIC PNL TOTAL CA: CPT | Performed by: INTERNAL MEDICINE

## 2021-06-13 PROCEDURE — 99024 POSTOP FOLLOW-UP VISIT: CPT | Performed by: SURGERY

## 2021-06-13 RX ORDER — POLYETHYLENE GLYCOL 3350 17 G/17G
17 POWDER, FOR SOLUTION ORAL DAILY
Status: DISCONTINUED | OUTPATIENT
Start: 2021-06-13 | End: 2021-06-14 | Stop reason: HOSPADM

## 2021-06-13 RX ADMIN — Medication 1 TABLET: at 08:56

## 2021-06-13 RX ADMIN — POLYETHYLENE GLYCOL 3350 17 G: 17 POWDER, FOR SOLUTION ORAL at 13:29

## 2021-06-13 RX ADMIN — CARVEDILOL 3.12 MG: 3.12 TABLET, FILM COATED ORAL at 08:56

## 2021-06-13 RX ADMIN — SODIUM CHLORIDE, PRESERVATIVE FREE 10 ML: 5 INJECTION INTRAVENOUS at 20:26

## 2021-06-13 RX ADMIN — SODIUM CHLORIDE, PRESERVATIVE FREE 10 ML: 5 INJECTION INTRAVENOUS at 08:56

## 2021-06-13 RX ADMIN — ASPIRIN 81 MG: 81 TABLET, COATED ORAL at 20:26

## 2021-06-13 RX ADMIN — SODIUM CHLORIDE, PRESERVATIVE FREE 10 ML: 5 INJECTION INTRAVENOUS at 20:27

## 2021-06-13 RX ADMIN — PANTOPRAZOLE SODIUM 40 MG: 40 TABLET, DELAYED RELEASE ORAL at 05:59

## 2021-06-13 RX ADMIN — CARVEDILOL 3.12 MG: 3.12 TABLET, FILM COATED ORAL at 17:06

## 2021-06-13 NOTE — PLAN OF CARE
Goal Outcome Evaluation:  Plan of Care Reviewed With: patient, daughter        Progress: improving  Outcome Summary: hgb 8.3, no c/o pain, has had 2 bm's today, refused suppository, started miralax, picc line dressing changed, good blood return, per MD staples will be removed prior to discharge possibly tomorrow continue to monitor

## 2021-06-13 NOTE — PROGRESS NOTES
Name: Jaqueline Rojas ADMIT: 2021   : 1924  PCP: Provider, No Known    MRN: [id number] LOS: 20 days   AGE/SEX: 89 y.o. female  ROOM: [facility]     Subjective   Subjective   2 BM since yesterday. Tolerating some small amounts of po      Review of Systems   Gastrointestinal: Negative for abdominal distention and nausea.        Objective   Objective   Vital Signs  Temp:  [96.9 °F (36.1 °C)-97.5 °F (36.4 °C)] 97 °F (36.1 °C)  Heart Rate:  [72-78] 78  Resp:  [16] 16  BP: (120-130)/(62-73) 130/73  SpO2:  [95 %-97 %] 97 %  on   ;   Device (Oxygen Therapy): room air  Body mass index is 20.76 kg/m².  Physical Exam  Vitals and nursing note reviewed.   Constitutional:       Appearance: Normal appearance.   HENT:      Head: Normocephalic and atraumatic.   Cardiovascular:      Rate and Rhythm: Normal rate and regular rhythm.      Heart sounds: No murmur heard.     Pulmonary:      Effort: Pulmonary effort is normal.      Breath sounds: Normal breath sounds.   Abdominal:      General: There is no distension.      Palpations: Abdomen is soft.      Tenderness: There is abdominal tenderness.      Comments: Hypoactive bowel sounds   Musculoskeletal:      Right lower leg: No edema.      Left lower leg: No edema.   Skin:     General: Skin is warm and dry.      Findings: No rash.   Neurological:      General: No focal deficit present.      Mental Status: She is alert and oriented to person, place, and time.   Psychiatric:         Mood and Affect: Mood normal.         Behavior: Behavior normal.         Results Review     I reviewed the patient's new clinical results.  Results from last 7 days   Lab Units 21  0600 21  0749 21  0514 21  0450 06/10/21  0433   WBC 10*3/mm3 4.91  --  4.62 6.01 6.50   HEMOGLOBIN g/dL 8.3* 8.1* 7.0* 8.6* 9.2*   PLATELETS 10*3/mm3 236  --  201 247 287     Results from last 7 days   Lab Units 21  0600 21  0514 21  0602 06/10/21  0433   SODIUM mmol/L 133* 131*  131* 133*   POTASSIUM mmol/L 4.2 3.9 3.9 3.9   CHLORIDE mmol/L 101 100 103 100   CO2 mmol/L 28.1 26.0 24.3 25.5   BUN mg/dL 12 13 16 12   CREATININE mg/dL 0.33* 0.33* 0.33* 0.32*   GLUCOSE mg/dL 107* 198* 175* 169*   Estimated Creatinine Clearance: 35.6 mL/min (A) (by C-G formula based on SCr of 0.33 mg/dL (L)).  Results from last 7 days   Lab Units 06/12/21  0514 06/08/21  0635 06/07/21  0701   ALBUMIN g/dL 2.50* 2.70* 2.80*   BILIRUBIN mg/dL 0.2 0.3 0.3   ALK PHOS U/L 53 44 43   AST (SGOT) U/L 12 13 13   ALT (SGPT) U/L 8 9 11     Results from last 7 days   Lab Units 06/13/21  0600 06/12/21  0514 06/11/21  0602 06/11/21  0450 06/10/21  0433 06/09/21  0944 06/08/21  0635 06/07/21  0701   CALCIUM mg/dL 8.1* 7.8* 7.8*  --  8.0* 8.0* 8.3 8.1*   ALBUMIN g/dL  --  2.50*  --   --   --   --  2.70* 2.80*   MAGNESIUM mg/dL  --  1.8  --  1.3* 2.0 2.0 2.0 1.9   PHOSPHORUS mg/dL  --  2.5 2.5  --  2.7 3.1 2.3* 2.7       COVID19   Date Value Ref Range Status   06/01/2021 Not Detected Not Detected - Ref. Range Final   05/28/2021 Not Detected Not Detected - Ref. Range Final     No results found for: HGBA1C, POCGLU    XR Abdomen KUB  ABDOMEN KUB     CLINICAL HISTORY: Abdominal distention. Worsening abdominal pain. Postop  resection of cecal carcinoma.     Compared to the previous abdomen KUB dated 06/09/2021.     Again seen are multiple segments of mildly dilated air are filled small  and large bowel within the abdomen and pelvis consistent with adynamic  ileus. The overall degree of bowel distention is unchanged. There is no  obvious free air on these supine views. Surgical skin staples are noted  along the midline.     This report was finalized on 6/11/2021 7:24 PM by Dr. Anil Arthur M.D.       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    · Ileus, resolving- diet as tolerated  · Encouraged her to continue to ambulate with PT/family.  · Hgb stable  · SCDs for DVT prophylaxis.  · Limited code (no CPR, no intubation).  · Dispo: Possible dc home in AM if continues to progress      Wil Shields MD  [facility]  06/13/21  17:31 EDT

## 2021-06-13 NOTE — PROGRESS NOTES
Chief Complaint:    S/P Right hemicolectomy, POD 11    Subjective:    The patient is feeling better with only minimal residual abdominal pain that is intermittent.  She has been tolerating a diet with no nausea or vomiting but her appetite remains very poor.  She is passing flatus and having small bowel movements.    Objective:    Temp:  [96.9 °F (36.1 °C)-97.5 °F (36.4 °C)] 97 °F (36.1 °C)  Heart Rate:  [72-87] 78  Resp:  [14-16] 16  BP: (114-130)/(59-73) 130/73    Physical Exam  Constitutional:       Appearance: She is not ill-appearing or toxic-appearing.   Abdominal:      Palpations: Abdomen is soft.      Tenderness: There is abdominal tenderness (Mildly tender) in the epigastric area.      Comments: Incision: Intact with no evidence of infection.   Neurological:      Mental Status: She is alert.   Psychiatric:         Behavior: Behavior is cooperative.         Results:    WBC is 4.91.  H/H is 8.3/25.2.    Impression/Plan:    The patient is POD 11 from a right hemicolectomy.  Her expected postop ileus continues to slowly resolve.  She is now tolerating a diet but her oral intake is marginal.    She will be started on MiraLAX.    Hopefully she will be ready for discharge home tomorrow.    Baldemar Griffin Jr., M.D.

## 2021-06-14 VITALS
OXYGEN SATURATION: 95 % | WEIGHT: 121 LBS | HEIGHT: 64 IN | BODY MASS INDEX: 20.66 KG/M2 | DIASTOLIC BLOOD PRESSURE: 59 MMHG | TEMPERATURE: 98 F | HEART RATE: 77 BPM | SYSTOLIC BLOOD PRESSURE: 134 MMHG | RESPIRATION RATE: 16 BRPM

## 2021-06-14 PROBLEM — K91.89 POSTOPERATIVE ILEUS (HCC): Status: RESOLVED | Noted: 2021-06-10 | Resolved: 2021-06-14

## 2021-06-14 PROBLEM — K56.7 POSTOPERATIVE ILEUS (HCC): Status: RESOLVED | Noted: 2021-06-10 | Resolved: 2021-06-14

## 2021-06-14 PROBLEM — R73.9 HYPERGLYCEMIA: Status: RESOLVED | Noted: 2021-05-25 | Resolved: 2021-06-14

## 2021-06-14 LAB
ANION GAP SERPL CALCULATED.3IONS-SCNC: 8.2 MMOL/L (ref 5–15)
BASOPHILS # BLD AUTO: 0.03 10*3/MM3 (ref 0–0.2)
BASOPHILS NFR BLD AUTO: 0.6 % (ref 0–1.5)
BUN SERPL-MCNC: 14 MG/DL (ref 8–23)
BUN/CREAT SERPL: 29.8 (ref 7–25)
CALCIUM SPEC-SCNC: 8.3 MG/DL (ref 8.2–9.6)
CHLORIDE SERPL-SCNC: 97 MMOL/L (ref 98–107)
CO2 SERPL-SCNC: 25.8 MMOL/L (ref 22–29)
CREAT SERPL-MCNC: 0.47 MG/DL (ref 0.57–1)
DEPRECATED RDW RBC AUTO: 42.7 FL (ref 37–54)
EOSINOPHIL # BLD AUTO: 0.12 10*3/MM3 (ref 0–0.4)
EOSINOPHIL NFR BLD AUTO: 2.5 % (ref 0.3–6.2)
ERYTHROCYTE [DISTWIDTH] IN BLOOD BY AUTOMATED COUNT: 14.1 % (ref 12.3–15.4)
GFR SERPL CREATININE-BSD FRML MDRD: 123 ML/MIN/1.73
GLUCOSE SERPL-MCNC: 103 MG/DL (ref 65–99)
HCT VFR BLD AUTO: 24.2 % (ref 34–46.6)
HGB BLD-MCNC: 8.1 G/DL (ref 12–15.9)
IMM GRANULOCYTES # BLD AUTO: 0.03 10*3/MM3 (ref 0–0.05)
IMM GRANULOCYTES NFR BLD AUTO: 0.6 % (ref 0–0.5)
LYMPHOCYTES # BLD AUTO: 0.95 10*3/MM3 (ref 0.7–3.1)
LYMPHOCYTES NFR BLD AUTO: 19.8 % (ref 19.6–45.3)
MCH RBC QN AUTO: 27.9 PG (ref 26.6–33)
MCHC RBC AUTO-ENTMCNC: 33.5 G/DL (ref 31.5–35.7)
MCV RBC AUTO: 83.4 FL (ref 79–97)
MONOCYTES # BLD AUTO: 0.69 10*3/MM3 (ref 0.1–0.9)
MONOCYTES NFR BLD AUTO: 14.4 % (ref 5–12)
NEUTROPHILS NFR BLD AUTO: 2.98 10*3/MM3 (ref 1.7–7)
NEUTROPHILS NFR BLD AUTO: 62.1 % (ref 42.7–76)
NRBC BLD AUTO-RTO: 0 /100 WBC (ref 0–0.2)
PLATELET # BLD AUTO: 292 10*3/MM3 (ref 140–450)
PMV BLD AUTO: 8.9 FL (ref 6–12)
POTASSIUM SERPL-SCNC: 4.1 MMOL/L (ref 3.5–5.2)
RBC # BLD AUTO: 2.9 10*6/MM3 (ref 3.77–5.28)
SODIUM SERPL-SCNC: 131 MMOL/L (ref 136–145)
WBC # BLD AUTO: 4.8 10*3/MM3 (ref 3.4–10.8)

## 2021-06-14 PROCEDURE — 85025 COMPLETE CBC W/AUTO DIFF WBC: CPT | Performed by: INTERNAL MEDICINE

## 2021-06-14 PROCEDURE — 80048 BASIC METABOLIC PNL TOTAL CA: CPT | Performed by: INTERNAL MEDICINE

## 2021-06-14 RX ORDER — ACETAMINOPHEN 325 MG/1
650 TABLET ORAL EVERY 4 HOURS PRN
Start: 2021-06-14

## 2021-06-14 RX ORDER — CARVEDILOL 3.12 MG/1
3.12 TABLET ORAL 2 TIMES DAILY WITH MEALS
Status: ON HOLD
Start: 2021-06-14 | End: 2023-03-10 | Stop reason: SDUPTHER

## 2021-06-14 RX ORDER — PANTOPRAZOLE SODIUM 40 MG/1
40 TABLET, DELAYED RELEASE ORAL DAILY
Qty: 30 TABLET | Refills: 0 | Status: SHIPPED | OUTPATIENT
Start: 2021-06-14

## 2021-06-14 RX ORDER — POLYETHYLENE GLYCOL 3350 17 G/17G
17 POWDER, FOR SOLUTION ORAL DAILY PRN
Qty: 30 PACKET | Refills: 0 | Status: SHIPPED | OUTPATIENT
Start: 2021-06-14

## 2021-06-14 RX ADMIN — CARVEDILOL 3.12 MG: 3.12 TABLET, FILM COATED ORAL at 08:55

## 2021-06-14 RX ADMIN — Medication 1 TABLET: at 08:55

## 2021-06-14 RX ADMIN — PANTOPRAZOLE SODIUM 40 MG: 40 TABLET, DELAYED RELEASE ORAL at 06:28

## 2021-06-14 NOTE — PAYOR COMM NOTE
"DISCHARGED    REF #[id number]    F: [phone]      Nanci Bridges (89 y.o. Female)     Date of Birth Social Security Number Address Home Phone MRN    [id number]  [id number]  [facility] [phone] [id number]    Mu-ism Marital Status          Jew        Admission Date Admission Type Admitting Provider Attending Provider Department, Room/Bed    21 Emergency Samantha Leon MD  [facility] [address] [place], P398/1    Discharge Date Discharge Disposition Discharge Destination        2021 Home or Self Care              Attending Provider: (none)   Allergies: No Known Allergies    Isolation: None   Infection: None   Code Status: No CPR    Ht: 162.6 cm (64.02\")   Wt: 54.9 kg (121 lb)    Admission Cmt: None   Principal Problem: 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]                 Active Insurance as of 2021     Primary Coverage     Payor Plan Insurance Group Employer/Plan Group    AETNA MEDICARE REPLACEMENT AETNA MEDICARE REPLACEMENT [id number]     Payor Plan Address Payor Plan Phone Number Payor Plan Fax Number Effective Dates    [address] [phone]  2018 - None Entered    [facility] [id number]       Subscriber Name Subscriber Birth Date Member ID       NANCI BRIDGES 1924 [id number]                 Emergency Contacts      (Rel.) Home Phone Work Phone Mobile Phone    Sejal Muñoz (Daughter) [phone] -- [phone]    CrystalChristopherKayden (Son) [phone] -- [phone]    HerminioMagda (Daughter) [phone] -- [phone]    Malena Bridges (Other) [phone] -- [phone]               Discharge Summary      Wil Shields MD at 21 0909              Patient Name: Nanci Bridges  : 1924  MRN: [id number]    Date of Admission: 2021  Date of Discharge:  2021  Primary Care Physician: Provider, No Known      Chief Complaint:   Abdominal Pain      Discharge Diagnoses     Active Hospital Problems   "    Diagnosis  POA   • **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]  Yes   • 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 Rojas is a 89 y.o. female with a history of SVT who presented to [facility] initially complaining of mostly right-sided abdominal pain and nausea.  Please see the admitting history and physical for further details.  Initial imaging looked most consistent with focal colitis or enteritis in the right side of the colon and terminal ileum, concerning for ischemic colitis but could not rule out neoplasm.  She was started on IV antibiotics.  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  More significantly the CTA showed abnormal masslike thickening in the terminal ileum ileocecal junction and cecum with progressive wall thickening with some gas bubbles, as well as new ascites and moderate bilateral pleural effusions.  Some small pulmonary nodules were also seen.  Surgical consultation was obtained and they thought she may have had a contained perforation initially but since she was clinically improving they elected to observe her after discussion with patient and her family.  They repeated CT scan 5/31 with findings more consistent with a lobulated tumor mass and low-grade  obstruction rather than infectious.  In light of these findings and after discussion with family and patient they elected to proceed with right hemicolectomy which was performed 6/2.  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  Postoperatively she had the expected ileus which was fairly prolonged.  She required TPN as she was n.p.o. for several days so a PICC line was placed.  Slowly but surely her bowel function has returned.  Her appetite is still poor but she is tolerating it fine.  She is up and ambulatory with a walker and PT has signed off of her so she should be stable for discharge with family.  Otherwise she has been stable.  Cardiology did follow her initially due to some and episode of SVT but she is back on her carvedilol and is doing well from that standpoint without recurrence.  She will be discharged today if okay with surgery.      Day of Discharge     Subjective:  No new complaints.  Appetite still poor    Physical Exam:  Temp:  [97.1 °F (36.2 °C)-98 °F (36.7 °C)] 98 °F (36.7 °C)  Heart Rate:  [70-85] 77  Resp:  [14-16] 16  BP: (113-134)/(59-69) 134/59  Body mass index is 20.76 kg/m².  Physical Exam  Vitals and nursing note reviewed.   Constitutional:       Appearance: Normal appearance.   HENT:      Head: Normocephalic and atraumatic.   Cardiovascular:      Rate and Rhythm: Normal rate and regular rhythm.      Heart sounds: No murmur heard.     Pulmonary:      Effort: Pulmonary effort is normal.      Breath sounds: Normal breath sounds.   Abdominal:      General: Bowel sounds are normal. There is no distension.      Palpations: Abdomen is soft.      Tenderness: There is abdominal tenderness.   Musculoskeletal:      Right lower leg: No edema.      Left lower leg: No edema.   Skin:     General: Skin is warm and dry.      Findings: No rash.   Neurological:      General: No focal deficit present.      Mental Status: She is alert and  oriented to person, place, and time.   Psychiatric:         Mood and Affect: Mood normal.         Behavior: Behavior normal.         Consultants     Consult Orders (all) (From admission, onward)     Start     Ordered    06/06/21 1313  IV TEAM - Consult for PICC Line (IV Team to Determine Number of Lumens)  Once     Provider:  (Not yet assigned)    06/06/21 1313    05/28/21 1302  Inpatient General Surgery Consult  STAT     Specialty:  General Surgery  Provider:  Franky Hernandez MD    05/28/21 1301    05/28/21 1300  Inpatient General Surgery Consult  Once,   Status:  Canceled     Specialty:  General Surgery  Provider:  Franky Hernandez MD    05/28/21 1300    05/27/21 1508  Inpatient Palliative Care Nurse Consult  Once,   Status:  Canceled     Provider:  (Not yet assigned)    05/27/21 1508    05/25/21 1427  Inpatient Cardiology Consult  Once     Specialty:  Cardiology  Provider:  Marcell Muñoz MD    05/25/21 1438    05/25/21 1302  Inpatient Gastroenterology Consult  Once     Specialty:  Gastroenterology  Provider:  Doris Quiroga MD    05/25/21 1303    05/24/21 2024  [facility] (on-call MD unless specified) Details  Once     Specialty:  Hospitalist  Provider:  (Not yet assigned)    05/24/21 2023              Procedures     COLON RESECTION RIGHT      Imaging Results (All)     Procedure Component Value Units Date/Time    XR Abdomen KUB [[id number]] Collected: 06/11/21 1920     Updated: 06/11/21 1927    Narrative:      ABDOMEN KUB     CLINICAL HISTORY: Abdominal distention. Worsening abdominal pain. Postop  resection of cecal carcinoma.     Compared to the previous abdomen KUB dated 06/09/2021.     Again seen are multiple segments of mildly dilated air are filled small  and large bowel within the abdomen and pelvis consistent with adynamic  ileus. The overall degree of bowel distention is unchanged. There is no  obvious free air on these supine views. Surgical skin staples are noted  along the midline.     This report  was finalized on 6/11/2021 7:24 PM by Dr. Anil Arthur M.D.       XR Abdomen KUB [[id number]] Collected: 06/09/21 1209     Updated: 06/09/21 1310    Narrative:      ONE VIEW PORTABLE ABDOMEN AT 10:38 AM     HISTORY: Recent abdominal surgery. Follow up of ileus.     FINDINGS: There is considerable bowel gas scattered in both large and  small bowel most consistent with postoperative ileus and showing no  change from 3 days ago. The gas-filled transverse colon measures up to  5.5 cm in diameter which is unchanged.     This report was finalized on 6/9/2021 1:07 PM by Dr. Ivan Johnson M.D.       XR Abdomen KUB [[id number]] Collected: 06/06/21 0534     Updated: 06/06/21 0534    Narrative:        Patient: NANCI BRIDGES  Time Out: 05:34  Exam(s): FILM ABDOMEN     EXAM:    XR Abdomen, 2 Views    CLINICAL HISTORY:     Reason for exam: post op ileus.    TECHNIQUE:    Frontal view of the abdomen pelvis with upright view of the abdomen.    COMPARISON:    No relevant prior studies available.    FINDINGS:    Intraperitoneal space:  No free air.    Gastrointestinal tract:  Unremarkable.  No dilation.  Air-filled loops   of small and large bowel with small bowel loops measuring up to 3.3 cm   and large bowel loops measuring up to 6.3 cm.    Bones joints:  Unremarkable.    IMPRESSION:       Air-filled loops of small and large bowel consistent with postoperative   ileus.  No free air under the hemidiaphragms.    Impression:          Electronically signed by Sandro Sotomayor M.D. on 06-06-21 at 0534    CT Abdomen Pelvis With Contrast [[id number]] Collected: 05/31/21 1339     Updated: 05/31/21 1407    Narrative:      CT ABDOMEN PELVIS W CONTRAST-     CLINICAL HISTORY: Colitis with possible contained perforation. Patient  improving clinically and afebrile.     TECHNIQUE: Spiral CT images were acquired through the abdomen and pelvis  with IV and oral contrast and were reconstructed in 3 mm thick slices.     Radiation dose reduction  techniques were utilized, including automated  exposure control and exposure modulation based on body size.     COMPARISON: CT scan of the abdomen and pelvis dated 05/24/2021.     FINDINGS: Images through the lung bases demonstrate small bilateral  posteriorly layering pleural effusions that are new since the preceding  CT scan. The liver, spleen, pancreas, and adrenal glands are  unremarkable. There is a single small simple cyst in the left kidney.  The kidneys are otherwise unremarkable.     The stomach and small bowel are well opacified with oral contrast. There  is slight dilatation of the small bowel. There is extensive lobulated  wall thickening involving the cecum that appears somewhat more prominent  and masslike than on the previous CT scan. The findings are most  consistent with a colon carcinoma. This produces marked luminal  narrowing within the cecum, and is likely resulting in the mild small  bowel dilatation. The cecum/mass measures approximately 6.0 x 4.9 cm in  diameter. The remainder of the colon is contracted and contains very  little formed stool and air. There is no evidence of diffuse colitis. No  extraluminal air is identified in the abdomen or pelvis. There are  numerous diverticuli in the sigmoid colon. There is no CT evidence of  diverticulitis. No lymphadenopathy is identified. There is a small to  moderate amount of ascites adjacent to the liver and spleen and in the  pelvis that has developed since the preceding CT scan. Peritoneal  carcinomatosis cannot be excluded. However, no definite masses are  identified in the peritoneal cavity.       Impression:      Probable lobulated tumor mass within the cecum consistent  with colon carcinoma producing marked luminal narrowing within the cecum  and likely low-grade obstruction, with multiple segments of mildly  distended small bowel in the abdomen and pelvis. There is no definite  evidence of metastatic disease within the abdomen or pelvis.  However, a  small-to-moderate amount of ascites has developed since the preceding CT  scan dated 05/24/2021. Therefore, peritoneal carcinomatosis  cannot be  entirely excluded. Small bilateral pleural effusions have also  developed.     These findings were discussed with Dr. Marcelo Griffin on 05/31/2021 at 2:00  PM     This report was finalized on 5/31/2021 2:04 PM by Dr. Anil Arthur M.D.       CT Angiogram Chest With & Without Contrast [[id number]] Collected: 05/28/21 1245     Updated: 05/28/21 1258    Narrative:      CT ANGIOGRAM CHEST, ABDOMEN AND PELVIS WITH IV CONTRAST     HISTORY: 89-year-old female with elevated D-dimer. Evaluate celiac and  mesenteric arteries and evaluate for pulmonary embolus.     TECHNIQUE: CT angiogram includes imaging from the thoracic inlet to the  trochanters with IV contrast in the arterial phase of contrast. Data  reconstructed in coronal and sagittal planes. As part of the technique  for this CT exam, radiation dose reduction techniques were utilized,  including automated exposure control and exposure modulation based on  body size.     COMPARISON: CT abdomen and pelvis with IV contrast 05/24/2021.     FINDINGS: There is no intrapulmonary arterial filling defect to diagnose  pulmonary embolus. There is mild pulmonary arterial enlargement. Mild  atherosclerotic calcification is present involving the thoracic aorta  and great vessels. The left gastric artery, splenic artery, common  hepatic artery arises directly from the aortic arch. There is mild  narrowing of the origin of the common hepatic artery and there is  moderate stenosis of the origin of the splenic artery associated with  partially calcified plaque. 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 The superior mesenteric  artery and the inferior mesenteric artery are patent. There is mild  calcified plaque involving the proximal left and right renal arteries  with moderate  stenosis of the origin of the right renal artery. The left  renal artery is patent. Atherosclerotic calcification is present  involving the abdominal aorta which measures up to 2 x 1.7 cm. Both  common iliac arteries and internal and external iliac arteries and  common femoral arteries are patent. The proximal superficial and deep  femoral arteries are patent.     NONANGIOGRAM FINDINGS: Moderate bilateral pleural effusions layered  apparently. There is dense, linear lingular opacity due to atelectasis  or mild consolidation. This is without change. 5 mm subpleural right  lower lobe pulmonary nodule is present on image 88. Within the medial  left upper lobe on image 25 there is a 6 mm nodular opacity. This may be  related to scarring and there is mild biapical pleural parenchymal  scarring. There is a kyphotic curvature of the thoracic spine associated  multilevel degenerative disc disease and disc space narrowing and there  is partial bony fusion of the T5-T6-T7 vertebral bodies.     Since the exam 4 days ago there has developed ascites and there is new  perihepatic fluid extending anterior to the liver that appears partly  loculated and measures 1.7 cm in AP dimension. This exhibits mild  peripheral enhancement suggesting peritonitis. There is also mild free  fluid extending into the pelvis. Spleen, pancreas, adrenal glands,  kidneys exhibit no interval change or acute abnormality. A left lower  pole renal cyst is partially exophytic anteriorly measures 2.3 cm. There  is no hydronephrosis.     There is progressive abnormal, masslike bowel wall thickening within the  right lower quadrant. Wall thickening is present involving the terminal  ileum and ileocecal junction and the cecum. This area of masslike wall  thickening measures approximately 5.1 x 4.4 cm. Anteromedial to the  involved segments, there are bubbles of gas that are not clearly  intraluminal suspicious for bowel perforation with developing  collection  extending anterior and inferior to the involved bowel segments. Normal  appendix is not visualized. There is no mature collection in this  region. There is surrounding stranding and haziness within the  mesentery. There is mild gas distention of bowel loops potentially due  to mild ileus. There is an L1 vertebral body hemangioma. Chronic  bilateral L5 pars defects are present and there is grade 1 and  borderline grade 2 spondylolisthesis of L5 with respect to S1.       Impression:      1. Abnormal masslike wall thickening of the region of the terminal  ileum, ileocecal junction, cecum with evidence for progression in wall  thickening when compared to the previous exam. There are small bubbles  of adjacent gas which are not clearly intraluminal raising the  possibility of a bowel perforation without evidence for drainable or  mature collection in this region. However, there has developed new  ascites and there is a apparent partly loculated perihepatic fluid  extending anterior to the liver exhibits with thin band of surrounding  enhancement and this may reflect peritonitis.  2. Celiac artery variant anatomy as the common hepatic artery, left  gastric artery, splenic artery arise directly from the abdominal aorta.  There is atherosclerotic disease with mild narrowing of the origin of  the common hepatic artery and moderate stenosis of the origin of the  splenic artery. The SMA and TERESITA are patent.  3. Moderate bilateral pleural effusions layer dependently. Dense, linear  lingular opacity is without change. 5 mm subpleural noncalcified right  lower lobe pulmonary nodule. 6 mm nodular opacity medial left upper  lobe.  4. Partially peripherally calcified splenic artery aneurysm measuring 14  x 11 mm.  5. No evidence for pulmonary thromboembolic disease.     Discussed with Dr. Pond 5/20/2021 at 12:30 PM.     This report was finalized on 5/28/2021 12:55 PM by Dr. Kentrell Shepherd M.D.       CT Angiogram  Abdomen Pelvis [[id number]] Collected: 05/28/21 1245     Updated: 05/28/21 1258    Narrative:      CT ANGIOGRAM CHEST, ABDOMEN AND PELVIS WITH IV CONTRAST     HISTORY: 89-year-old female with elevated D-dimer. Evaluate celiac and  mesenteric arteries and evaluate for pulmonary embolus.     TECHNIQUE: CT angiogram includes imaging from the thoracic inlet to the  trochanters with IV contrast in the arterial phase of contrast. Data  reconstructed in coronal and sagittal planes. As part of the technique  for this CT exam, radiation dose reduction techniques were utilized,  including automated exposure control and exposure modulation based on  body size.     COMPARISON: CT abdomen and pelvis with IV contrast 05/24/2021.     FINDINGS: There is no intrapulmonary arterial filling defect to diagnose  pulmonary embolus. There is mild pulmonary arterial enlargement. Mild  atherosclerotic calcification is present involving the thoracic aorta  and great vessels. The left gastric artery, splenic artery, common  hepatic artery arises directly from the aortic arch. There is mild  narrowing of the origin of the common hepatic artery and there is  moderate stenosis of the origin of the splenic artery associated with  partially calcified plaque. 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 The superior mesenteric  artery and the inferior mesenteric artery are patent. There is mild  calcified plaque involving the proximal left and right renal arteries  with moderate stenosis of the origin of the right renal artery. The left  renal artery is patent. Atherosclerotic calcification is present  involving the abdominal aorta which measures up to 2 x 1.7 cm. Both  common iliac arteries and internal and external iliac arteries and  common femoral arteries are patent. The proximal superficial and deep  femoral arteries are patent.     NONANGIOGRAM FINDINGS: Moderate bilateral pleural effusions  layered  apparently. There is dense, linear lingular opacity due to atelectasis  or mild consolidation. This is without change. 5 mm subpleural right  lower lobe pulmonary nodule is present on image 88. Within the medial  left upper lobe on image 25 there is a 6 mm nodular opacity. This may be  related to scarring and there is mild biapical pleural parenchymal  scarring. There is a kyphotic curvature of the thoracic spine associated  multilevel degenerative disc disease and disc space narrowing and there  is partial bony fusion of the T5-T6-T7 vertebral bodies.     Since the exam 4 days ago there has developed ascites and there is new  perihepatic fluid extending anterior to the liver that appears partly  loculated and measures 1.7 cm in AP dimension. This exhibits mild  peripheral enhancement suggesting peritonitis. There is also mild free  fluid extending into the pelvis. Spleen, pancreas, adrenal glands,  kidneys exhibit no interval change or acute abnormality. A left lower  pole renal cyst is partially exophytic anteriorly measures 2.3 cm. There  is no hydronephrosis.     There is progressive abnormal, masslike bowel wall thickening within the  right lower quadrant. Wall thickening is present involving the terminal  ileum and ileocecal junction and the cecum. This area of masslike wall  thickening measures approximately 5.1 x 4.4 cm. Anteromedial to the  involved segments, there are bubbles of gas that are not clearly  intraluminal suspicious for bowel perforation with developing collection  extending anterior and inferior to the involved bowel segments. Normal  appendix is not visualized. There is no mature collection in this  region. There is surrounding stranding and haziness within the  mesentery. There is mild gas distention of bowel loops potentially due  to mild ileus. There is an L1 vertebral body hemangioma. Chronic  bilateral L5 pars defects are present and there is grade 1 and  borderline grade 2  spondylolisthesis of L5 with respect to S1.       Impression:      1. Abnormal masslike wall thickening of the region of the terminal  ileum, ileocecal junction, cecum with evidence for progression in wall  thickening when compared to the previous exam. There are small bubbles  of adjacent gas which are not clearly intraluminal raising the  possibility of a bowel perforation without evidence for drainable or  mature collection in this region. However, there has developed new  ascites and there is a apparent partly loculated perihepatic fluid  extending anterior to the liver exhibits with thin band of surrounding  enhancement and this may reflect peritonitis.  2. Celiac artery variant anatomy as the common hepatic artery, left  gastric artery, splenic artery arise directly from the abdominal aorta.  There is atherosclerotic disease with mild narrowing of the origin of  the common hepatic artery and moderate stenosis of the origin of the  splenic artery. The SMA and TERESITA are patent.  3. Moderate bilateral pleural effusions layer dependently. Dense, linear  lingular opacity is without change. 5 mm subpleural noncalcified right  lower lobe pulmonary nodule. 6 mm nodular opacity medial left upper  lobe.  4. Partially peripherally calcified splenic artery aneurysm measuring 14  x 11 mm.  5. No evidence for pulmonary thromboembolic disease.     Discussed with Dr. Pond 5/20/2021 at 12:30 PM.     This report was finalized on 5/28/2021 12:55 PM by Dr. Kentrell Shepherd M.D.       CT Abdomen Pelvis With Contrast [[id number]] Collected: 05/24/21 2053     Updated: 05/26/21 0959    Narrative:      CT OF THE ABDOMEN AND PELVIS WITH CONTRAST 05/24/2021     HISTORY: Abdominal pain.     TECHNIQUE: Axial images were obtained from the lung bases to the  symphysis pubis after intravenous contrast. No oral contrast was given.     FINDINGS: The liver, gallbladder, spleen, pancreas, adrenals and kidneys  appear within normal limits  except for renal cysts.     There is some tortuosity of the cecum and proximal ascending colon with  some wall thickening of the cecum best seen on coronal reconstruction  images 31 through 50. There is mild wall thickening of the terminal  ileum as well seen on images 92 through 103. No other bowel wall  thickening is seen. A few segments of small bowel in the pelvis appear  at the upper limits of normal for diameter but no bowel dilatation is  seen. No free air is seen.     The appendix is not clearly seen.     The uterus appears within normal limits for age. Urinary bladder is  unremarkable.     Bilateral L5 spondylolysis is seen with mild anterolisthesis of L5 on  S1.       Impression:      1. Tortuosity of the cecum and proximal ascending colon with some wall  thickening. There is some mild wall thickening of the terminal ileum as  well. Findings could be related to focal colitis/enteritis (which could  conceivably be ischemic) though there could be neoplastic involvement.  Correlation with a colonoscopy may be helpful if clinically indicated.  2. A few segments of small bowel are at the upper limits of normal for  diameter but no bowel dilatation or free air is seen. If clinically  indicated follow-up plain radiographs may be helpful.     Radiation dose reduction techniques were utilized, including automated  exposure control and exposure modulation based on body size.     This report was finalized on 5/26/2021 9:56 AM by Dr. Romeo Vann M.D.           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  · No hemodynamically significant stenosis of the superior mesenteric artery (SMA) is noted.  · No hemodynamically significant stenosis of the inferior mesenteric artery (TERESITA) is noted.      Pertinent Labs     Results from last 7 days   Lab Units 06/14/21  4976  06/13/21  0600 06/12/21  0749 06/12/21  0514 06/11/21  0450   WBC 10*3/mm3 4.80 4.91  --  4.62 6.01   HEMOGLOBIN g/dL 8.1* 8.3* 8.1* 7.0* 8.6*   PLATELETS 10*3/mm3 292 236  --  201 247     Results from last 7 days   Lab Units 06/14/21  0629 06/13/21  0600 06/12/21  0514 06/11/21  0602   SODIUM mmol/L 131* 133* 131* 131*   POTASSIUM mmol/L 4.1 4.2 3.9 3.9   CHLORIDE mmol/L 97* 101 100 103   CO2 mmol/L 25.8 28.1 26.0 24.3   BUN mg/dL 14 12 13 16   CREATININE mg/dL 0.47* 0.33* 0.33* 0.33*   GLUCOSE mg/dL 103* 107* 198* 175*   Estimated Creatinine Clearance: 35.6 mL/min (A) (by C-G formula based on SCr of 0.47 mg/dL (L)).  Results from last 7 days   Lab Units 06/12/21  0514 06/08/21  0635   ALBUMIN g/dL 2.50* 2.70*   BILIRUBIN mg/dL 0.2 0.3   ALK PHOS U/L 53 44   AST (SGOT) U/L 12 13   ALT (SGPT) U/L 8 9     Results from last 7 days   Lab Units 06/14/21  0629 06/13/21  0600 06/12/21  0514 06/11/21  0602 06/11/21  0450 06/10/21  0433 06/09/21  0944 06/08/21  0635   CALCIUM mg/dL 8.3 8.1* 7.8* 7.8*  --  8.0* 8.0* 8.3   ALBUMIN g/dL  --   --  2.50*  --   --   --   --  2.70*   MAGNESIUM mg/dL  --   --  1.8  --  1.3* 2.0 2.0 2.0   PHOSPHORUS mg/dL  --   --  2.5 2.5  --  2.7 3.1 2.3*               Invalid input(s): LDLCALC          Test Results Pending at Discharge       Discharge Details        Discharge Medications      New Medications      Instructions Start Date   acetaminophen 325 MG tablet  Commonly known as: TYLENOL   650 mg, Oral, Every 4 Hours PRN      pantoprazole 40 MG EC tablet  Commonly known as: PROTONIX   40 mg, Oral, Daily      polyethylene glycol 17 g packet  Commonly known as: MIRALAX   17 g, Oral, Daily PRN         Changes to Medications      Instructions Start Date   carvedilol 3.125 MG tablet  Commonly known as: COREG  What changed:   · when to take this  · Another medication with the same name was removed. Continue taking this medication, and follow the directions you see here.   3.125 mg, Oral, 2 Times  Daily With Meals         Continue These Medications      Instructions Start Date   aspirin 81 MG EC tablet   81 mg, Oral, Daily      multivitamin tablet tablet  Generic drug: multivitamin   1 tablet, Oral, Daily             No Known Allergies      Discharge Disposition:  Home or Self Care    Discharge Diet:  Diet Order   Procedures   • Diet Regular; GI Soft       Discharge Activity:       CODE STATUS:    Code Status and Medical Interventions:   Ordered at: 05/26/21 171     Limited Support to NOT Include:    Cardioversion/Defibrillation    Intubation     Level Of Support Discussed With:    Patient     Code Status:    No CPR     Medical Interventions (Level of Support Prior to Arrest):    Limited       No future appointments.  Follow-up Information     Provider, No Known Follow up.    Why: f/u with PCP in 2 weeks  Contact information:  [facility] [id number]             Baldemar Collins Jr., MD Follow up.    Specialty: General Surgery  Why: per his instructions  Contact information:  [address]  [facility] [address] [phone]                   Time Spent on Discharge:  Greater than 30 minutes      Wil Shields MD  [facility]  06/14/21  09:09 EDT              Electronically signed by Wil Shields MD at 06/14/21 0920       Discharge Order (From admission, onward)     Start     Ordered    06/14/21 0905  Discharge patient  Once     Expected Discharge Date: 06/14/21    Discharge Disposition: Home or Self Care    Physician of Record for Attribution - Please select from Treatment Team: BALDEMAR COLLINS JR [[id number]]    Review needed by CMO to determine Physician of Record: No       Question Answer Comment   Physician of Record for Attribution - Please select from Treatment Team BALDEMAR COLLINS JR    Review needed by CMO to determine Physician of Record No        06/14/21 0907

## 2021-06-14 NOTE — PAYOR COMM NOTE
"CLINICAL PER REQUEST    I AM STILL AWAITING RESPONSE FROM CLINICAL FAXED ON 6/10.    REF #[id number]    F: [phone]    Nanci Bridges (89 y.o. Female)     Date of Birth Social Security Number Address Home Phone MRN    09/06/1924  [id number]  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    CrystalKayden (Son) [phone] -- [phone]    HerminioMagda (Daughter) [phone] -- [phone]    Malena Bridges (Other) [phone] -- [phone]            Vital Signs (last day)     Date/Time   Temp   Temp src   Pulse   Resp   BP   Patient Position   SpO2    06/14/21 0826   98 (36.7)   Oral   77   16   134/59   Lying   95    06/14/21 0410   97.2 (36.2)   Oral   83   16   132/69   Lying   " 97    06/13/21 2041   97.1 (36.2)   Oral   70   16   113/62   Lying   92    06/13/21 1703   98 (36.7)   Oral   85   14   113/67   Lying   97    06/13/21 0722   97 (36.1)   Oral   78   16   130/73   Lying   97    06/13/21 0356   96.9 (36.1)   Oral   76   16   125/68   Lying   97              Oxygen Therapy (last day)     Date/Time   SpO2   Device (Oxygen Therapy)   Flow (L/min)   Oxygen Concentration (%)   ETCO2 (mmHg)    06/14/21 0855   --   room air   --   --   --    06/14/21 0826   95   room air   --   --   --    06/14/21 0410   97   room air   --   --   --    06/13/21 2211   --   room air   --   --   --    06/13/21 2041   92   room air   --   --   --    06/13/21 1703   97   --   --   --   --    06/13/21 0722   97   --   --   --   --    06/13/21 0356   97   room air   --   --   --              Lines, Drains & Airways    Active LDAs     Name:   Placement date:   Placement time:   Site:   Days:    PICC Double Lumen 06/06/21 Right Basilic   06/06/21    1748    Basilic   7                  Medication Administration Report for Jaqueline Rojas as of 06/14/21 1231    Legend:    Given Hold Not Given Due Canceled Entry Other Actions    Time Time (Time) Time  Time-Action       Discontinued     Completed     Future     MAR Hold     Linked           Medications 06/08/21 06/09/21 06/10/21 06/11/21 06/12/21 06/13/21 06/14/21    aspirin EC tablet 81 mg  Dose: 81 mg  Freq: Daily Route: PO  Start: 05/25/21 0900    Admin Instructions:   Herbal/drug interaction: Avoid use with ginkgo biloba. Do not crush or chew.  Do not exceed 4 grams of aspirin in a 24 hr period.    If given for pain, use the following pain scale:   Mild Pain = Pain Score of 1-3, CPOT 1-2  Moderate Pain = Pain Score of 4-6, CPOT 3-4  Severe Pain = Pain Score of 7-10, CPOT 5-8     2052 2107 2051 2045 2123 2026 2100           bisacodyl (DULCOLAX) suppository 10 mg  Dose: 10 mg  Freq: Daily Route: RE  Start: 06/11/21 2045               2045        (1000)        (0858) [C]        (0856)           carvedilol (COREG) tablet 3.125 mg  Dose: 3.125 mg  Freq: 2 Times Daily With Meals Route: PO  Start: 06/11/21 2300    Admin Instructions:   Hold if SBP is less than 100.  Give with food.        2254        0823       1712        0856       1706        0855       1800           digoxin (LANOXIN) injection 0.5 mg  Dose: 0.5 mg  Freq: Once Route: IV  Start: 05/25/21 1530    Admin Instructions:    Check and record heart rate. Use filter needle to withdraw dose from ampule. Dose may be pushed over 5 minutes.               multivitamin (THERAGRAN) tablet 1 tablet  Dose: 1 tablet  Freq: Daily Route: PO  Start: 05/25/21 0900    Admin Instructions:        0943        0826        0926        1024        0823        0856        0855           pantoprazole (PROTONIX) EC tablet 40 mg  Dose: 40 mg  Freq: Every Early Morning Route: PO  Start: 06/13/21 0600    Admin Instructions:   Swallow whole; do not crush, split, or chew.          0559        0628           polyethylene glycol (MIRALAX) packet 17 g  Dose: 17 g  Freq: Daily Route: PO  Start: 06/13/21 1415    Admin Instructions:   Use 4-8 ounces of water, tea, or juice for each 17 gram dose.          1329        (7256)           sodium chloride 0.9 % flush 10 mL  Dose: 10 mL  Freq: Every 12 Hours Scheduled Route: IV  Start: 06/06/21 2100    Admin Instructions:   Lumen #2     0943       2052 0826 2110        0927       (2101) [C]        1024 2102 0823 2123        0856       2026        (0905)       2100           sodium chloride 0.9 % flush 10 mL  Dose: 10 mL  Freq: Every 12 Hours Scheduled Route: IV  Start: 06/06/21 2100    Admin Instructions:   Lumen #1     0943       2053 2110 0927 2101 1024 2102 0823 2123        0856       2027        (0905)       2100           sodium chloride 0.9 % flush 10 mL  Dose: 10 mL  Freq: Every 12  Hours Scheduled Route: IV  Start: 05/25/21 0030    0944       2057)               0359        0928       2101        1024       2106 (4303) 0474 (4277)       2027        (3936) [C]       2100          Completed Medications  Medications 06/08/21 06/09/21 06/10/21 06/11/21 06/12/21 06/13/21 06/14/21       ceFOXitin (MEFOXIN) 2 g/100 mL NS (MBP)  Dose: 2 g  Freq: On Call to O.R. Route: IV  Indications of Use: PERIOPERATIVE PHARMACOPROPHYLAXIS  Start: 06/02/21 0600   End: 06/02/21 1325    Admin Instructions:   Caution: Look alike/sound alike drug alert Activate vial before using.               diatrizoate meglumine-sodium (GASTROGRAFIN) 66-10 % solution 30 mL  Dose: 30 mL  Freq: Once Route: PO  Start: 05/31/21 1130   End: 05/31/21 1120              famotidine (PEPCID) injection 20 mg  Dose: 20 mg  Freq: Once Route: IV  Start: 06/02/21 1226   End: 06/02/21 1238    Admin Instructions:   Dilute to 10 mL total volume and give IV push over 2 minutes.               iopamidol (ISOVUE-300) 61 % injection 100 mL  Dose: 100 mL  Freq: Once in Imaging Route: IV  Start: 05/31/21 1400   End: 05/31/21 1301              iopamidol (ISOVUE-300) 61 % injection 100 mL  Dose: 100 mL  Freq: Once in Imaging Route: IV  Start: 05/24/21 1933   End: 05/1934              iopamidol (ISOVUE-370) 76 % injection 100 mL  Dose: 100 mL  Freq: Once in Imaging Route: IV  Start: 05/28/21 1245   End: 05/28/21 1147              magnesium citrate solution 150 mL  Dose: 150 mL  Freq: Once Route: PO  Start: 05/28/21 0930   End: 05/28/21 1150              morphine injection 2 mg  Dose: 2 mg  Freq: Once Route: IV  Start: 05/24/21 1704   End: 05/24/21 1720    Admin Instructions:   If given for pain, use the following pain scale:  Mild Pain = Pain Score of 1-3, CPOT 1-2  Moderate Pain = Pain Score of 4-6, CPOT 3-4  Severe Pain = Pain Score of 7-10, CPOT 5-8               ondansetron (ZOFRAN) injection 4 mg  Dose: 4 mg  Freq: Once Route:  IV  Start: 05/24/21 1704   End: 05/24/21 1720              PEG-KCl-NaCl-NaSulf-Na Asc-C (MOVIPREP) powder 1,000 mL  Dose: 1,000 mL  Freq: Every 12 Hours Scheduled Route: PO  Start: 05/27/21 1700   End: 05/27/21 2139    Admin Instructions:   Empty packet A and packet B into the disposable container. Add lukewarm drinking water to the top line of the container. Mix to dissolve. Drink 8 ounces every 15 minutes until the entire contents of the container are consumed (approximately one hour). Then drink an additional 16 ounces of clear liquid. Repeat dosing regimen once more as ordered by provider. For inpatient bowel prep: Give first dose 1000mL (one container) 3 1/2 hours before bedtime and 2nd dose 1000 mL (once container) at least 3 1/2 hours prior to procedure. Adjust administration times as indicated.                           piperacillin-tazobactam (ZOSYN) 3.375 g in iso-osmotic dextrose 50 ml (premix)  Dose: 3.375 g  Freq: Every 8 Hours Route: IV  Indications of Use: SEPSIS  Start: 05/25/21 0300   End: 05/31/21 2229    Admin Instructions:   Refrigerate               piperacillin-tazobactam (ZOSYN) 3.375 g in iso-osmotic dextrose 50 ml (premix)  Dose: 3.375 g  Freq: Once Route: IV  Indications of Use: SEPSIS  Start: 05/24/21 2030   End: 05/24/21 2144    Admin Instructions:   Refrigerate               potassium chloride (K-DUR,KLOR-CON) ER tablet 40 mEq  Dose: 40 mEq  Freq: Once Route: PO  Start: 05/28/21 0930   End: 05/28/21 0844    Admin Instructions:   Swallow whole; do not crush, split, or chew.              Discontinued Medications  Medications 06/08/21 06/09/21 06/10/21 06/11/21 06/12/21 06/13/21 06/14/21       carvedilol (COREG) tablet 3.125 mg  Dose: 3.125 mg  Freq: 2 Times Daily With Meals Route: PO  Start: 05/25/21 1800   End: 05/26/21 1415    Admin Instructions:   Give with food.               carvedilol (COREG) tablet 3.125 mg  Dose: 3.125 mg  Freq: Every Morning Route: PO  Start: 05/25/21 0700   End:  05/25/21 1303    Admin Instructions:   Give with food.               carvedilol (COREG) tablet 6.25 mg  Dose: 6.25 mg  Freq: Every Evening Route: PO  Start: 05/24/21 2345   End: 05/25/21 1303    Admin Instructions:   Give with food.               carvedilol (COREG) tablet 6.25 mg  Dose: 6.25 mg  Freq: Every Evening Route: PO  Start: 05/25/21 1700   End: 05/24/21 2259    Admin Instructions:   Give with food.               famotidine (PEPCID) injection 20 mg  Dose: 20 mg  Freq: Daily Route: IV  Start: 06/04/21 2000   End: 06/12/21 1119    Admin Instructions:   Dilute to 10 mL total volume and give IV push over 2 minutes.     0943        0826        0926        1023        0822             Fat Emul Fish Oil/Plant Based (SMOFLIPID) 20 % emulsion 100 mL  Dose: 100 mL  Freq: Once per day on Mon Wed Fri Route: IV  Start: 06/07/21 1800   End: 06/12/21 1123    Admin Instructions:   Use a 1.2 micron filter      1803 2033              metoprolol tartrate (LOPRESSOR) tablet 12.5 mg  Dose: 12.5 mg  Freq: Every 12 Hours Scheduled Route: PO  Start: 05/26/21 1700   End: 06/11/21 2114    0943       2052        0826       2107        0926       2051        1024       (2035) [C]              metoprolol tartrate (LOPRESSOR) tablet 12.5 mg  Dose: 12.5 mg  Freq: Every 12 Hours Scheduled Route: PO  Start: 05/26/21 2100   End: 05/26/21 1524              sodium chloride 0.9 % flush 10 mL  Dose: 10 mL  Freq: Every 12 Hours Scheduled Route: IV  Start: 06/06/21 2100   End: 06/06/21 1530    Admin Instructions:   Lumen #2               sodium chloride 0.9 % flush 10 mL  Dose: 10 mL  Freq: Every 12 Hours Scheduled Route: IV  Start: 06/06/21 2100   End: 06/06/21 1530    Admin Instructions:   Lumen #1               sodium chloride 0.9 % flush 3 mL  Dose: 3 mL  Freq: Every 12 Hours Scheduled Route: IV  Start: 06/02/21 1226   End: 06/02/21 1507                   ,   Medication Administration Report for Jaqueline Rojas as of 06/14/21 5235     Legend:    Given Hold Not Given Due Canceled Entry Other Actions    Time Time (Time) Time  Time-Action       Discontinued     Completed     Future     MAR Hold     Linked           Medications 06/08/21 06/09/21 06/10/21 06/11/21 06/12/21 06/13/21 06/14/21   Discontinued Medications    Adult Central 2-in-1 TPN  Rate: 60 mL/hr Freq: Continuous TPN Route: IV  Start: 06/11/21 1800   End: 06/12/21 2032    Admin Instructions:   TPN #6  Use a 0.22 micron filter.     Order specific questions:   Indication Prolonged Paralytic Ileus  Is patient volume restricted No         2033        1441             Adult Central 2-in-1 TPN  Rate: 60 mL/hr Freq: Continuous TPN Route: IV  Start: 06/10/21 1800   End: 06/11/21 1809    Admin Instructions:   TPN #5  Use a 0.22 micron filter.     Order specific questions:   Indication Prolonged Paralytic Ileus  Is patient volume restricted No        1810               Adult Central 2-in-1 TPN  Rate: 60 mL/hr Freq: Continuous TPN Route: IV  Start: 06/09/21 1800   End: 06/10/21 1802    Admin Instructions:   TPN #4  Use a 0.22 micron filter.     Order specific questions:   Indication Prolonged Paralytic Ileus  Is patient volume restricted No       1803                Adult Central 2-in-1 TPN  Rate: 60 mL/hr Freq: Continuous TPN Route: IV  Start: 06/08/21 1800   End: 06/09/21 1811    Admin Instructions:   TPN #3  Use a 0.22 micron filter.     Order specific questions:   Indication Prolonged Paralytic Ileus  Is patient volume restricted No      1812                 Adult Central 2-in-1 TPN  Rate: 60 mL/hr Freq: Continuous TPN Route: IV  Start: 06/07/21 1800   End: 06/08/21 1820    Admin Instructions:   TPN #2  Use a 0.22 micron filter.     Order specific questions:   Indication Prolonged Paralytic Ileus  Is patient volume restricted No                Adult Central 2-in-1 TPN  Rate: 40 mL/hr Freq: Continuous TPN Route: IV  Start: 06/06/21 1800   End: 06/07/21 1817    Admin Instructions:   TPN #1  Use a  0.22 micron filter.     Order specific questions:   Indication Prolonged Paralytic Ileus  Is patient volume restricted No                dextrose 5 % and sodium chloride 0.45 % with KCl 20 mEq/L infusion  Rate: 100 mL/hr Dose: 100 mL/hr  Freq: Continuous Route: IV  Start: 06/06/21 1400   End: 06/08/21 0922              dextrose 5 % and sodium chloride 0.45 % with KCl 20 mEq/L infusion  Rate: 100 mL/hr Dose: 100 mL/hr  Freq: Continuous Route: IV  Start: 06/05/21 1515   End: 06/06/21 1314              lactated ringers infusion  Rate: 100 mL/hr Dose: 100 mL/hr  Freq: Continuous Route: IV  Start: 06/02/21 1503   End: 06/03/21 1114              lactated ringers infusion  Rate: 9 mL/hr Dose: 9 mL/hr  Freq: Continuous Route: IV  Start: 06/02/21 1226   End: 06/02/21 1605              Pharmacy to Dose TPN  Freq: Continuous PRN Route: XX  PRN Reason: Consult  Start: 06/06/21 1313   End: 06/12/21 1759    Order specific questions:   Indication Prolonged Paralytic Ileus  Location of venous access Central  Catheter position confirmed by x-ray No  Is patient volume restricted No                Pharmacy to Dose Zosyn  Freq: Continuous PRN Route: XX  PRN Reason: Consult  Indications of Use: SEPSIS  Start: 05/24/21 2338   End: 05/25/21 0809              sodium chloride 0.9 % infusion  Rate: 100 mL/hr Dose: 100 mL/hr  Freq: Continuous Route: IV  Start: 05/25/21 0030   End: 05/29/21 1439              sodium chloride 0.9 % with KCl 20 mEq/L infusion  Rate: 75 mL/hr Dose: 75 mL/hr  Freq: Continuous Route: IV  Start: 06/02/21 1700   End: 06/05/21 1427              sodium chloride 0.9 % with KCl 20 mEq/L infusion  Rate: 50 mL/hr Dose: 50 mL/hr  Freq: Continuous Route: IV  Start: 05/29/21 1530   End: 06/02/21 1605                    and   Medication Administration Report for Jaqueline Rojas as of 06/14/21 1231    Legend:    Given Hold Not Given Due Canceled Entry Other Actions    Time Time (Time) Time  Time-Action       Discontinued      Completed     Future     MAR Hold     Linked           Medications 06/08/21 06/09/21 06/10/21 06/11/21 06/12/21 06/13/21 06/14/21    acetaminophen (TYLENOL) tablet 650 mg  Dose: 650 mg  Freq: Every 4 Hours PRN Route: PO  PRN Reason: Mild Pain   Start: 05/24/21 2339    Admin Instructions:   Do not exceed 4 grams of acetaminophen in a 24 hr period.    If given for pain, use the following pain scale:   Mild Pain = Pain Score of 1-3, CPOT 1-2  Moderate Pain = Pain Score of 4-6, CPOT 3-4  Severe Pain = Pain Score of 7-10, CPOT 5-8  Do not exceed 4 grams of acetaminophen in a 24 hr period. Max dose of 2gm for AST/ALT greater than 120 units/L      If given for pain, use the following pain scale:   Mild Pain = Pain Score of 1-3, CPOT 1-2  Moderate Pain = Pain Score of 4-6, CPOT 3-4  Severe Pain = Pain Score of 7-10, CPOT 5-8     0054       1614        1848        0943       1823        1630        1712            Or  acetaminophen (TYLENOL) 160 MG/5ML solution 650 mg  Dose: 650 mg  Freq: Every 4 Hours PRN Route: PO  PRN Reason: Mild Pain   Start: 05/24/21 2339    Admin Instructions:   Do not exceed 4 grams of acetaminophen in a 24 hr period.    If given for pain, use the following pain scale:   Mild Pain = Pain Score of 1-3, CPOT 1-2  Moderate Pain = Pain Score of 4-6, CPOT 3-4  Severe Pain = Pain Score of 7-10, CPOT 5-8  Do not exceed 4 grams of acetaminophen in a 24 hr period. Max dose of 2gm for AST/ALT greater than 120 units/L      If given for pain, use the following pain scale:   Mild Pain = Pain Score of 1-3, CPOT 1-2  Moderate Pain = Pain Score of 4-6, CPOT 3-4  Severe Pain = Pain Score of 7-10, CPOT 5-8                                                               Or  acetaminophen (TYLENOL) suppository 650 mg  Dose: 650 mg  Freq: Every 4 Hours PRN Route: RE  PRN Reason: Mild Pain   Start: 05/24/21 2339    Admin Instructions:   Do not exceed 4 grams of acetaminophen in a 24 hr period. Max dose of 2gm for AST/ALT  greater than 120 units/L      If given for pain, use the following pain scale:   Mild Pain = Pain Score of 1-3, CPOT 1-2  Moderate Pain = Pain Score of 4-6, CPOT 3-4  Severe Pain = Pain Score of 7-10, CPOT 5-8                                                                HYDROcodone-acetaminophen (NORCO) 5-325 MG per tablet 1 tablet  Dose: 1 tablet  Freq: Every 6 Hours PRN Route: PO  PRN Reason: Moderate Pain   Start: 06/03/21 1943   End: 06/16/21 0937    Admin Instructions:   [LICO]    Do not exceed 4 grams of acetaminophen in a 24 hr period. Max dose of 2gm for AST/ALT greater than 120 units/L        If given for pain, use the following pain scale:   Mild Pain = Pain Score of 1-3, CPOT 1-2  Moderate Pain = Pain Score of 4-6, CPOT 3-4  Severe Pain = Pain Score of 7-10, CPOT 5-8      (2109) [C]        2051               nitroglycerin (NITROSTAT) SL tablet 0.4 mg  Dose: 0.4 mg  Freq: Every 5 Minutes PRN Route: SL  PRN Reason: Chest Pain  PRN Comment: Only if SBP Greater Than 100  Start: 05/24/21 2339    Admin Instructions:   If Pain Unrelieved After 3 Doses Notify MD               ondansetron (ZOFRAN) injection 4 mg  Dose: 4 mg  Freq: Every 6 Hours PRN Route: IV  PRN Reasons: Nausea,Vomiting  Start: 05/24/21 2342    Admin Instructions:   If BOTH ondansetron (ZOFRAN) and promethazine (PHENERGAN) are ordered use ondansetron first and THEN promethazine IF ondansetron is ineffective.       2009               potassium chloride (K-DUR,KLOR-CON) ER tablet 40 mEq  Dose: 40 mEq  Freq: As Needed Route: PO  PRN Comment: Potassium Replacement.  See Admin Instructions  Start: 05/28/21 1333    Admin Instructions:   Potassium 3.1 or Less Give KCl 40 mEq q4h x3 Doses   Potassium 3.2 - 3.6 Give KCl 40 mEq q4h x2 Doses     Check Potassium 4 Hours After Last Dose Given   Check Magnesium if Potassium Level Remains Low After Replacement   DO NOT GIVE if CrCl is Less Than 30 mL/minute or Urine Output Less Than 30 mL/hr  Swallow whole; do  not crush, split, or chew.              Or  potassium chloride (KLOR-CON) packet 40 mEq  Dose: 40 mEq  Freq: As Needed Route: PO  PRN Comment: potassium replacement, see admin instructions  Start: 05/28/21 1333    Admin Instructions:   Potassium 3.1 or Less Give KCl 40 mEq q4h x3 Doses   Potassium 3.2 - 3.6 Give KCl 40 mEq q4h x2 Doses     Check Potassium 4 Hours After Last Dose Given   Check Magnesium if Potassium Level Remains Low After Replacement   DO NOT GIVE if CrCl is Less Than 30 mL/minute or Urine Output Less Than 30 mL/hr              Or  potassium chloride 10 mEq in 100 mL IVPB  Dose: 10 mEq  Freq: Every 1 Hour PRN Route: IV  PRN Comment: Potassium Replacement - See Admin Instructions  Start: 05/28/21 1333    Admin Instructions:   Peripheral or Central IV  Potassium 3.1 or Less Give KCl 10 mEq/100 mL NS IV q1h x6 Doses  Potassium 3.2 - 3.6 Give KCl 10 mEq/100 mL NS q1h x4 Doses    Check Potassium 4 Hours After Last Dose Given  Check Magnesium if Potassium Remains Low After Replacement  DO NOT GIVE if CrCl is Less Than 30 mL/minute or Urine Output Less Than 30 mL/hr.     Rates Greater Than 10 mEq/hr Require ECG Monitoring.  OUTPATIENT/NON-MONITORED UNITS: Potassium Chloride standard bolus infusion rate is a maximum of 10 mEq/hr on unmonitored patients    MONITORED UNITS: Potassium Chloride standard bolus infusion rate is a maximum of 20 mEq/hr on ECG monitored patients ONLY                 potassium phosphate 45 mmol in sodium chloride 0.9 % 500 mL infusion  Dose: 45 mmol  Freq: As Needed Route: IV  PRN Comment: Peripheral IV - Phosphorus Less Than 1.3 mg/dL  Start: 06/06/21 0839    Admin Instructions:   Recheck Phosphorus level 6 hours after replacement complete.  Refrigerate.     Doses listed as mmol of phosphate.   4.4 mEq of Potassium = 3 mmol of Phosphate                     Or  potassium phosphate 30 mmol in sodium chloride 0.9 % 250 mL infusion  Dose: 30 mmol  Freq: As Needed Route: IV  PRN Comment:  Peripheral IV - Phosphorus 1.3 - 1.7 mg/dL  Start: 06/06/21 0839    Admin Instructions:   Recheck Phosphorus level 6 hours after replacement complete.  Refrigerate.     Doses listed as mmol of phosphate.   4.4 mEq of Potassium = 3 mmol of Phosphate                     Or  potassium phosphate 15 mmol in sodium chloride 0.9 % 100 mL infusion  Dose: 15 mmol  Freq: As Needed Route: IV  PRN Comment: Peripheral IV - Phosphorus 1.8 - 2.5 mg/dL  Start: 06/06/21 0839    Admin Instructions:   Recheck Phosphorus level 6 hours after replacement complete.  Refrigerate.     Doses listed as mmol of phosphate.   4.4 mEq of Potassium = 3 mmol of Phosphate                     Or  sodium phosphates 40 mmol in sodium chloride 0.9 % 500 mL IVPB  Dose: 40 mmol  Freq: As Needed Route: IV  PRN Comment: Peripheral IV - Phosphorus Less Than 1.3 mg/dL & Potassium Greater Than 4  Start: 06/06/21 0839    Admin Instructions:   Recheck Phosphorus level 6 hours after replacement.                     Or  sodium phosphates 20 mmol in sodium chloride 0.9 % 250 mL IVPB  Dose: 20 mmol  Freq: As Needed Route: IV  PRN Comment: Peripheral IV - Phosphorus 1.3 - 2.5 & Potassium Greater Than 4  Start: 06/06/21 0839    Admin Instructions:   Recheck Phosphorus level 6 hours after replacement complete     1610                 sodium chloride 0.9 % flush 10 mL  Dose: 10 mL  Freq: As Needed Route: IV  PRN Reason: Line Care  PRN Comment: After Medication Administration  Start: 06/06/21 1754              sodium chloride 0.9 % flush 10 mL  Dose: 10 mL  Freq: As Needed Route: IV  PRN Reason: Line Care  Start: 05/24/21 2339              sodium chloride 0.9 % flush 10 mL  Dose: 10 mL  Freq: As Needed Route: IV  PRN Reason: Line Care  Start: 05/24/21 1606              sodium chloride 0.9 % flush 20 mL  Dose: 20 mL  Freq: As Needed Route: IV  PRN Reason: Line Care  PRN Comment: After Blood Draws or Blood Product Administration  Start: 06/06/21 1754             Completed  Medications  Medications 06/08/21 06/09/21 06/10/21 06/11/21 06/12/21 06/13/21 06/14/21       morphine injection 4 mg  Dose: 4 mg  Freq: Every 4 Hours PRN Route: IV  PRN Reason: Severe Pain   Start: 06/02/21 1605   End: 06/03/21 1215    Admin Instructions:   If given for pain, use the following pain scale:  Mild Pain = Pain Score of 1-3, CPOT 1-2  Moderate Pain = Pain Score of 4-6, CPOT 3-4  Severe Pain = Pain Score of 7-10, CPOT 5-8              Discontinued Medications  Medications 06/08/21 06/09/21 06/10/21 06/11/21 06/12/21 06/13/21 06/14/21       albuterol (PROVENTIL) nebulizer solution 0.083% 2.5 mg/3mL  Dose: 2.5 mg  Freq: Once As Needed Route: NEBULIZATION  PRN Reasons: Wheezing,Shortness of Air  PRN Comment: bronchospasm  Start: 06/02/21 1501   End: 06/02/21 1559              bisacodyl (DULCOLAX) suppository 10 mg  Dose: 10 mg  Freq: Daily PRN Route: RE  PRN Reason: Constipation  Start: 06/10/21 0941   End: 06/11/21 1957              bupivacaine-EPINEPHrine PF (MARCAINE w/EPI) 0.5% -[id number] injection  Freq: As Needed  Start: 06/02/21 1337   End: 06/02/21 1500              diphenhydrAMINE (BENADRYL) capsule 25 mg  Dose: 25 mg  Freq: Every 30 Minutes PRN Route: PO  PRN Reason: Itching  PRN Comment: May repeat x 1  Indications of Use: EXTRAPYRAMIDAL REACTION,PRURITUS  Start: 06/02/21 1501   End: 06/02/21 1559    Admin Instructions:   Caution: Look alike/sound alike drug alert. This med may be ordered in other forms and routes. Before giving verify the last time the drug was given by any route/form.                 diphenhydrAMINE (BENADRYL) injection 12.5 mg  Dose: 12.5 mg  Freq: Every 15 Minutes PRN Route: IV  PRN Reason: Itching  PRN Comment: May repeat x 1  Start: 06/02/21 1501   End: 06/02/21 1559    Admin Instructions:   Caution: Look alike/sound alike drug alert. This med may be ordered in other forms and routes. Before giving verify the last time the drug was given by any route/form.                  ePHEDrine injection 5 mg  Dose: 5 mg  Freq: Once As Needed Route: IV  PRN Comment: symptomatic hypotension - Notify attending anesthesiologist if this needs to be given  Start: 06/02/21 1501   End: 06/02/21 1559    Admin Instructions:   Caution: Look alike/sound alike drug alert   Dilute with NS to 5-10 mg/mL.  Central line preferred, if unavailable use large bore IV access with frequent nurse monitoring of IV site.               fentaNYL citrate (PF) (SUBLIMAZE) injection 50 mcg  Dose: 50 mcg  Freq: Every 5 Minutes PRN Route: IV  PRN Reasons: Moderate Pain ,Severe Pain   Start: 06/02/21 1501   End: 06/02/21 1559    Admin Instructions:   May alternate fentanyl with hydromorphone using fentanyl first.    Maximum total dose of fentanyl is 200 mcg.  If given for pain, use the following pain scale:  Mild Pain = Pain Score of 1-3, CPOT 1-2  Moderate Pain = Pain Score of 4-6, CPOT 3-4  Severe Pain = Pain Score of 7-10, CPOT 5-8               fentaNYL citrate (PF) (SUBLIMAZE) injection 50 mcg  Dose: 50 mcg  Freq: Every 10 Minutes PRN Route: IV  PRN Reason: Severe Pain   Start: 06/02/21 1224   End: 06/02/21 1507    Admin Instructions:   Maximum total dose of fentanyl is 100 mcg.  If given for pain, use the following pain scale:  Mild Pain = Pain Score of 1-3, CPOT 1-2  Moderate Pain = Pain Score of 4-6, CPOT 3-4  Severe Pain = Pain Score of 7-10, CPOT 5-8               flumazenil (ROMAZICON) injection 0.2 mg  Dose: 0.2 mg  Freq: As Needed Route: IV  PRN Comment: for benzodiazepine induced unresponsiveness or sedation  Indications of Use: BENZODIAZEPINE-INDUCED SEDATION  Start: 06/02/21 1501   End: 06/02/21 1559    Admin Instructions:   Notify Anesthesia if given  ** give IV over 15-30 seconds **               HYDROcodone-acetaminophen (NORCO) 5-325 MG per tablet 1 tablet  Dose: 1 tablet  Freq: Once As Needed Route: PO  PRN Reason: Moderate Pain   Start: 06/02/21 1501   End: 06/02/21 1559    Admin Instructions:   [LICO]    Do  not exceed 4 grams of acetaminophen in a 24 hr period. Max dose of 2gm for AST/ALT greater than 120 units/L        If given for pain, use the following pain scale:   Mild Pain = Pain Score of 1-3, CPOT 1-2  Moderate Pain = Pain Score of 4-6, CPOT 3-4  Severe Pain = Pain Score of 7-10, CPOT 5-8               HYDROcodone-acetaminophen (NORCO) 7.5-325 MG per tablet 2 tablet  Dose: 2 tablet  Freq: Every 4 Hours PRN Route: PO  PRN Reason: Severe Pain   Start: 06/02/21 1501   End: 06/02/21 1559    Admin Instructions:   [LICO]    Do not exceed 4 grams of acetaminophen in a 24 hr period. Max dose of 2gm for AST/ALT greater than 120 units/L        If given for pain, use the following pain scale:   Mild Pain = Pain Score of 1-3, CPOT 1-2  Moderate Pain = Pain Score of 4-6, CPOT 3-4  Severe Pain = Pain Score of 7-10, CPOT 5-8               ibuprofen (ADVIL,MOTRIN) tablet 600 mg  Dose: 600 mg  Freq: Once As Needed Route: PO  PRN Reason: Mild Pain   Start: 06/02/21 1501   End: 06/02/21 1559    Admin Instructions:   If given for pain, use the following pain scale:  Mild Pain = Pain Score of 1-3, CPOT 1-2  Moderate Pain = Pain Score of 4-6, CPOT 3-4  Severe Pain = Pain Score of 7-10, CPOT 5-8               labetalol (NORMODYNE,TRANDATE) injection 5 mg  Dose: 5 mg  Freq: Every 5 Minutes PRN Route: IV  PRN Reason: High Blood Pressure  PRN Comment: for systolic blood pressure greater than 180 mmHg or diastolic blood pressure greater than 105 mmHg  Start: 06/02/21 1501   End: 06/02/21 1559    Admin Instructions:   Hold for heart rate less than 60.  Give by slow IV Push each 20mg (or less) over 2 minutes               lidocaine PF 1% (XYLOCAINE) injection 0.5 mL  Dose: 0.5 mL  Freq: Once As Needed Route: IJ  PRN Comment: IV Start  Start: 06/02/21 1224   End: 06/02/21 1507              midazolam (VERSED) injection 0.5 mg  Dose: 0.5 mg  Freq: Every 10 Minutes PRN Route: IV  PRN Comment: Anxiety prophylaxis, Pre-op comfort  Start: 06/02/21  1224   End: 06/02/21 1507    Admin Instructions:   May repeat dose in 10 minutes one time then contact provider for additional orders.                 morphine injection 2 mg  Dose: 2 mg  Freq: Every 4 Hours PRN Route: IV  PRN Reason: Severe Pain   Start: 05/25/21 0404   End: 06/02/21 1605    Admin Instructions:   If given for pain, use the following pain scale:  Mild Pain = Pain Score of 1-3, CPOT 1-2  Moderate Pain = Pain Score of 4-6, CPOT 3-4  Severe Pain = Pain Score of 7-10, CPOT 5-8               morphine injection 4 mg  Dose: 4 mg  Freq: Every 4 Hours PRN Route: IV  PRN Reason: Severe Pain   Start: 06/03/21 1943   End: 06/08/21 1425    Admin Instructions:   If given for pain, use the following pain scale:  Mild Pain = Pain Score of 1-3, CPOT 1-2  Moderate Pain = Pain Score of 4-6, CPOT 3-4  Severe Pain = Pain Score of 7-10, CPOT 5-8               naloxone (NARCAN) injection 0.2 mg  Dose: 0.2 mg  Freq: As Needed Route: IV  PRN Reasons: Opioid Reversal,Respiratory Depression  PRN Comment: unresponsiveness, decrease oxygen saturation  Indications of Use: ACUTE RESPIRATORY FAILURE,OPIOID-INDUCED RESPIRATORY DEPRESSION  Start: 06/02/21 1501   End: 06/02/21 1559    Admin Instructions:   Notify Anesthesia if given               ondansetron (ZOFRAN) injection 4 mg  Dose: 4 mg  Freq: Once As Needed Route: IV  PRN Reasons: Nausea,Vomiting  Indications of Use: POSTOPERATIVE NAUSEA AND VOMITING  Start: 06/02/21 1501   End: 06/02/21 1559    Admin Instructions:   If BOTH ondansetron (ZOFRAN) and promethazine (PHENERGAN) are ordered use ondansetron first and THEN promethazine IF ondansetron is ineffective.               Pharmacy to Dose TPN  Freq: Continuous PRN Route: XX  PRN Reason: Consult  Start: 06/06/21 1313   End: 06/12/21 1759    Order specific questions:   Indication Prolonged Paralytic Ileus  Location of venous access Central  Catheter position confirmed by x-ray No  Is patient volume restricted No                 Pharmacy to Dose Zosyn  Freq: Continuous PRN Route: XX  PRN Reason: Consult  Indications of Use: SEPSIS  Start: 05/24/21 2338   End: 05/25/21 0809              promethazine (PHENERGAN) suppository 25 mg  Dose: 25 mg  Freq: Once As Needed Route: RE  PRN Reasons: Nausea,Vomiting  Start: 06/02/21 1501   End: 06/02/21 1559    Admin Instructions:   If BOTH ondansetron (ZOFRAN) and promethazine (PHENERGAN) are ordered use ondansetron first and THEN promethazine IF ondansetron is ineffective.              Or  promethazine (PHENERGAN) tablet 25 mg  Dose: 25 mg  Freq: Once As Needed Route: PO  PRN Reasons: Nausea,Vomiting  Start: 06/02/21 1501   End: 06/02/21 1559    Admin Instructions:   If BOTH ondansetron (ZOFRAN) and promethazine (PHENERGAN) are ordered use ondansetron first and THEN promethazine IF ondansetron is ineffective.                 sodium chloride (NS) irrigation solution  Freq: As Needed  Start: 06/02/21 1337   End: 06/02/21 1500              sodium chloride 0.9 % flush 10 mL  Dose: 10 mL  Freq: As Needed Route: IV  PRN Reason: Line Care  PRN Comment: After Medication Administration  Start: 06/06/21 1511   End: 06/06/21 1530              sodium chloride 0.9 % flush 3-10 mL  Dose: 3-10 mL  Freq: As Needed Route: IV  PRN Reason: Line Care  Start: 06/02/21 1224   End: 06/02/21 1507                    Lab Results (last 24 hours)     Procedure Component Value Units Date/Time    Basic Metabolic Panel [[id number]]  (Abnormal) Collected: 06/14/21 0629    Specimen: Blood Updated: 06/14/21 0717     Glucose 103 mg/dL      BUN 14 mg/dL      Creatinine 0.47 mg/dL      Sodium 131 mmol/L      Potassium 4.1 mmol/L      Chloride 97 mmol/L      CO2 25.8 mmol/L      Calcium 8.3 mg/dL      eGFR Non African Amer 123 mL/min/1.73      BUN/Creatinine Ratio 29.8     Anion Gap 8.2 mmol/L     Narrative:      GFR Normal >60  Chronic Kidney Disease <60  Kidney Failure <15      CBC & Differential [[id number]]  (Abnormal) Collected: 06/14/21  629    Specimen: Blood Updated: 2150    Narrative:      The following orders were created for panel order CBC & Differential.  Procedure                               Abnormality         Status                     ---------                               -----------         ------                     CBC Auto Differential[[id number]]        Abnormal            Final result                 Please view results for these tests on the individual orders.    CBC Auto Differential [[id number]]  (Abnormal) Collected: 21    Specimen: Blood Updated: 21     WBC 4.80 10*3/mm3      RBC 2.90 10*6/mm3      Hemoglobin 8.1 g/dL      Hematocrit 24.2 %      MCV 83.4 fL      MCH 27.9 pg      MCHC 33.5 g/dL      RDW 14.1 %      RDW-SD 42.7 fl      MPV 8.9 fL      Platelets 292 10*3/mm3      Neutrophil % 62.1 %      Lymphocyte % 19.8 %      Monocyte % 14.4 %      Eosinophil % 2.5 %      Basophil % 0.6 %      Immature Grans % 0.6 %      Neutrophils, Absolute 2.98 10*3/mm3      Lymphocytes, Absolute 0.95 10*3/mm3      Monocytes, Absolute 0.69 10*3/mm3      Eosinophils, Absolute 0.12 10*3/mm3      Basophils, Absolute 0.03 10*3/mm3      Immature Grans, Absolute 0.03 10*3/mm3      nRBC 0.0 /100 WBC              Physician Progress Notes       Wil Shields MD at 21 1730              Name: Jaqueline Rojas ADMIT: 2021   : 1924  PCP: Provider, No Known    MRN: [id number] LOS: 20 days   AGE/SEX: 89 y.o. female  ROOM: [facility]     Subjective   Subjective   2 BM since yesterday. Tolerating some small amounts of po      Review of Systems   Gastrointestinal: Negative for abdominal distention and nausea.       Objective   Objective   Vital Signs  Temp:  [96.9 °F (36.1 °C)-97.5 °F (36.4 °C)] 97 °F (36.1 °C)  Heart Rate:  [72-78] 78  Resp:  [16] 16  BP: (120-130)/(62-73) 130/73  SpO2:  [95 %-97 %] 97 %  on   ;   Device (Oxygen Therapy): room air  Body mass index is 20.76 kg/m².  Physical  Exam  Vitals and nursing note reviewed.   Constitutional:       Appearance: Normal appearance.   HENT:      Head: Normocephalic and atraumatic.   Cardiovascular:      Rate and Rhythm: Normal rate and regular rhythm.      Heart sounds: No murmur heard.     Pulmonary:      Effort: Pulmonary effort is normal.      Breath sounds: Normal breath sounds.   Abdominal:      General: There is no distension.      Palpations: Abdomen is soft.      Tenderness: There is abdominal tenderness.      Comments: Hypoactive bowel sounds   Musculoskeletal:      Right lower leg: No edema.      Left lower leg: No edema.   Skin:     General: Skin is warm and dry.      Findings: No rash.   Neurological:      General: No focal deficit present.      Mental Status: She is alert and oriented to person, place, and time.   Psychiatric:         Mood and Affect: Mood normal.         Behavior: Behavior normal.         Results Review     I reviewed the patient's new clinical results.  Results from last 7 days   Lab Units 06/13/21  0600 06/12/21  0749 06/12/21  0514 06/11/21  0450 06/10/21  0433   WBC 10*3/mm3 4.91  --  4.62 6.01 6.50   HEMOGLOBIN g/dL 8.3* 8.1* 7.0* 8.6* 9.2*   PLATELETS 10*3/mm3 236  --  201 247 287     Results from last 7 days   Lab Units 06/13/21  0600 06/12/21  0514 06/11/21  0602 06/10/21  0433   SODIUM mmol/L 133* 131* 131* 133*   POTASSIUM mmol/L 4.2 3.9 3.9 3.9   CHLORIDE mmol/L 101 100 103 100   CO2 mmol/L 28.1 26.0 24.3 25.5   BUN mg/dL 12 13 16 12   CREATININE mg/dL 0.33* 0.33* 0.33* 0.32*   GLUCOSE mg/dL 107* 198* 175* 169*   Estimated Creatinine Clearance: 35.6 mL/min (A) (by C-G formula based on SCr of 0.33 mg/dL (L)).  Results from last 7 days   Lab Units 06/12/21  0514 06/08/21  0635 06/07/21  0701   ALBUMIN g/dL 2.50* 2.70* 2.80*   BILIRUBIN mg/dL 0.2 0.3 0.3   ALK PHOS U/L 53 44 43   AST (SGOT) U/L 12 13 13   ALT (SGPT) U/L 8 9 11     Results from last 7 days   Lab Units 06/13/21  0600 06/12/21  0514 06/11/21  0602  06/11/21  0450 06/10/21  0433 06/09/21  0944 06/08/21  0635 06/07/21  0701   CALCIUM mg/dL 8.1* 7.8* 7.8*  --  8.0* 8.0* 8.3 8.1*   ALBUMIN g/dL  --  2.50*  --   --   --   --  2.70* 2.80*   MAGNESIUM mg/dL  --  1.8  --  1.3* 2.0 2.0 2.0 1.9   PHOSPHORUS mg/dL  --  2.5 2.5  --  2.7 3.1 2.3* 2.7       COVID19   Date Value Ref Range Status   06/01/2021 Not Detected Not Detected - Ref. Range Final   05/28/2021 Not Detected Not Detected - Ref. Range Final     No results found for: HGBA1C, POCGLU    XR Abdomen KUB  ABDOMEN KUB     CLINICAL HISTORY: Abdominal distention. Worsening abdominal pain. Postop  resection of cecal carcinoma.     Compared to the previous abdomen KUB dated 06/09/2021.     Again seen are multiple segments of mildly dilated air are filled small  and large bowel within the abdomen and pelvis consistent with adynamic  ileus. The overall degree of bowel distention is unchanged. There is no  obvious free air on these supine views. Surgical skin staples are noted  along the midline.     This report was finalized on 6/11/2021 7:24 PM by Dr. Anil Arthur M.D.       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    · Ileus, resolving- diet as tolerated  · Encouraged her to continue to ambulate with PT/family.  · Hgb stable  · SCDs for DVT prophylaxis.  · Limited code (no CPR, no intubation).  · Dispo: Possible dc home in AM if continues to progress      Wil Shields MD  [facility]  06/13/21  17:31 EDT      Electronically signed by Wil Shields MD at 06/13/21 0288     Baldemar Griffin Jr., MD at 06/13/21 1314        Chief Complaint:    S/P Right hemicolectomy, POD 11    Subjective:    The patient is feeling better with only minimal residual abdominal pain that is intermittent.  She has been tolerating a diet with no nausea or vomiting but her appetite remains very poor.  She is passing flatus and having small bowel movements.    Objective:    Temp:  [96.9 °F (36.1 °C)-97.5 °F (36.4 °C)] 97 °F (36.1 °C)  Heart Rate:  [72-87] 78  Resp:  [14-16] 16  BP: (114-130)/(59-73) 130/73    Physical Exam  Constitutional:       Appearance: She is not ill-appearing or toxic-appearing.   Abdominal:      Palpations: Abdomen is soft.      Tenderness: There is abdominal tenderness (Mildly tender) in the epigastric area.      Comments: Incision: Intact with no evidence of infection.   Neurological:      Mental Status: She is alert.   Psychiatric:         Behavior: Behavior is cooperative.         Results:    WBC is 4.91.  H/H is 8.3/25.2.    Impression/Plan:    The patient is POD 11 from  a right hemicolectomy.  Her expected postop ileus continues to slowly resolve.  She is now tolerating a diet but her oral intake is marginal.    She will be started on MiraLAX.    Hopefully she will be ready for discharge home tomorrow.    Baldemar Griffin Jr., M.D.    Electronically signed by Baldemar Griffin Jr., MD at 21 1316     Baldemar Griffin Jr., MD at 21 1119        Chief Complaint:    S/P Right hemicolectomy, POD 10    Subjective:    The patient has mild epigastric abdominal pain still.  She is not having any nausea or vomiting.  She continues to pass flatus and had a bowel movement.    Objective:    Temp:  [96.7 °F (35.9 °C)-98.7 °F (37.1 °C)] 97 °F (36.1 °C)  Heart Rate:  [75-97] 75  Resp:  [16-18] 16  BP: (101-125)/(55-68) 125/55    Physical Exam  Constitutional:       Appearance: She is not ill-appearing or toxic-appearing.   Abdominal:      General: Bowel sounds are normal. There is distension.      Palpations: Abdomen is soft.      Tenderness: There is no abdominal tenderness.   Neurological:      Mental Status: She is alert.   Psychiatric:         Behavior: Behavior is cooperative.         Results:    WBC is 4.62.  H/H is 8.1/24.6.  Albumin is 2.50.    Impression/Plan:    The patient is POD 10 from a right hemicolectomy for cecal cancer.  She continues to have an expected postop ileus but it is clinically resolving.  She has a distended abdomen but she states that this is her baseline.  Her TPN will be weaned off and we will try to advance her diet.    She has several days of epigastric abdominal pain and the Pepcid will be changed to Protonix for improved acid blockage.    She was encouraged to continue to increase her activity.    Baldemar Griffin Jr., M.D.    Electronically signed by Baldemar Griffin Jr., MD at 21 1121     Wil Shields MD at 21 1101              Name: Jaqueline Rojas ADMIT: 2021   : 1924  PCP: Provider, No Known    MRN: [id number] LOS:  19 days   AGE/SEX: 89 y.o. female  ROOM: [facility]     Subjective   Subjective   Continued epigastric pain.  She did have a couple bowel movements after the bisacodyl depository    Review of Systems   Gastrointestinal: Positive for abdominal distention. Negative for nausea.       Objective   Objective   Vital Signs  Temp:  [96.7 °F (35.9 °C)-98.7 °F (37.1 °C)] 97 °F (36.1 °C)  Heart Rate:  [75-97] 75  Resp:  [16-18] 16  BP: (101-125)/(55-68) 125/55  SpO2:  [95 %-97 %] 97 %  on   ;   Device (Oxygen Therapy): room air  Body mass index is 20.76 kg/m².  Physical Exam  Vitals and nursing note reviewed.   Constitutional:       Appearance: Normal appearance.   HENT:      Head: Normocephalic and atraumatic.   Cardiovascular:      Rate and Rhythm: Normal rate and regular rhythm.      Heart sounds: No murmur heard.     Pulmonary:      Effort: Pulmonary effort is normal.      Breath sounds: Normal breath sounds.   Abdominal:      General: There is distension.      Palpations: Abdomen is soft.      Tenderness: There is abdominal tenderness.      Comments: Hypoactive bowel sounds   Musculoskeletal:      Right lower leg: No edema.      Left lower leg: No edema.   Skin:     General: Skin is warm and dry.      Findings: No rash.   Neurological:      General: No focal deficit present.      Mental Status: She is alert and oriented to person, place, and time.   Psychiatric:         Mood and Affect: Mood normal.         Behavior: Behavior normal.         Results Review     I reviewed the patient's new clinical results.  Results from last 7 days   Lab Units 06/12/21  0749 06/12/21  0514 06/11/21  0450 06/10/21  0433 06/09/21  0944   WBC 10*3/mm3  --  4.62 6.01 6.50 7.92   HEMOGLOBIN g/dL 8.1* 7.0* 8.6* 9.2* 9.9*   PLATELETS 10*3/mm3  --  201 247 287 350     Results from last 7 days   Lab Units 06/12/21  0514 06/11/21  0602 06/10/21  0433 06/09/21  0944   SODIUM mmol/L 131* 131* 133* 132*   POTASSIUM mmol/L 3.9 3.9 3.9 4.2   CHLORIDE mmol/L  100 103 100 100   CO2 mmol/L 26.0 24.3 25.5 23.6   BUN mg/dL 13 16 12 12   CREATININE mg/dL 0.33* 0.33* 0.32* 0.39*   GLUCOSE mg/dL 198* 175* 169* 203*   Estimated Creatinine Clearance: 35.6 mL/min (A) (by C-G formula based on SCr of 0.33 mg/dL (L)).  Results from last 7 days   Lab Units 06/12/21  0514 06/08/21  0635 06/07/21  0701 06/06/21  0654   ALBUMIN g/dL 2.50* 2.70* 2.80* 2.20*   BILIRUBIN mg/dL 0.2 0.3 0.3  --    ALK PHOS U/L 53 44 43  --    AST (SGOT) U/L 12 13 13  --    ALT (SGPT) U/L 8 9 11  --      Results from last 7 days   Lab Units 06/12/21  0514 06/11/21  0602 06/11/21  0450 06/10/21  0433 06/09/21  0944 06/08/21  0635 06/07/21  0701 06/06/21  0654   CALCIUM mg/dL 7.8* 7.8*  --  8.0* 8.0* 8.3 8.1* 7.8*   ALBUMIN g/dL 2.50*  --   --   --   --  2.70* 2.80* 2.20*   MAGNESIUM mg/dL 1.8  --  1.3* 2.0 2.0 2.0 1.9 2.0   PHOSPHORUS mg/dL 2.5 2.5  --  2.7 3.1 2.3* 2.7 1.9*       COVID19   Date Value Ref Range Status   06/01/2021 Not Detected Not Detected - Ref. Range Final   05/28/2021 Not Detected Not Detected - Ref. Range Final     No results found for: HGBA1C, POCGLU    XR Abdomen KUB  ABDOMEN KUB     CLINICAL HISTORY: Abdominal distention. Worsening abdominal pain. Postop  resection of cecal carcinoma.     Compared to the previous abdomen KUB dated 06/09/2021.     Again seen are multiple segments of mildly dilated air are filled small  and large bowel within the abdomen and pelvis consistent with adynamic  ileus. The overall degree of bowel distention is unchanged. There is no  obvious free air on these supine views. Surgical skin staples are noted  along the midline.     This report was finalized on 6/11/2021 7:24 PM by Dr. Anil Arthur M.D.       Scheduled Medications  aspirin, 81 mg, Oral, Daily  bisacodyl, 10 mg, Rectal, Daily  carvedilol, 3.125 mg, Oral, BID With Meals  digoxin, 0.5 mg, Intravenous, Once  famotidine, 20 mg, Intravenous, Daily  Fat Emul Fish Oil/Plant Based, 100 mL, Intravenous,  Once per day on Mon Wed Fri  multivitamin, 1 tablet, Oral, Daily  sodium chloride, 10 mL, Intravenous, Q12H  sodium chloride, 10 mL, Intravenous, Q12H  sodium chloride, 10 mL, Intravenous, Q12H    Infusions  Adult Central 2-in-1 TPN, , Last Rate: 60 mL/hr at 06/11/21 2033  Pharmacy to Dose TPN,     Diet  Diet Full Liquid  Adult Central 2-in-1 TPN      Assessment/Plan     Active Hospital Problems    Diagnosis  POA   • **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]  Yes   • 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    · Two bowel movements are promising.  Hopefully we are nearing the end of this ileus.  Continue bisacodyl  · Told her daughter and nursing that she could try some applesauce.  Further diet advancement per surgery  · Weaning TPN.  Monitor intake and electrolytes  · Encouraged her to continue to ambulate with PT/family.  · Early morning labs were likely in error with hemoglobin of seven.  Repeat was back in line with her previous readings.  Continue to monitor  · SCDs for DVT prophylaxis.  · Limited code (no CPR, no intubation).  · Dispo: Eventual DC home planned when GI function returns and joe PO      Wil Shields MD  [facility]  21  11:01 EDT      Electronically signed by Wil Shields MD at 21 1631     Baldemar Griffin Jr., MD at 21        Chief Complaint:    S/P Right hemicolectomy, POD 9    Subjective:    The patient is having some epigastric abdominal pain and increased abdominal distention.  She had some nausea last night but none today.  There has not been any vomiting.  She has passed bowel movements but they are small.  Abdominal x-rays today show an ongoing ileus.    Objective:    Temp:  [97 °F (36.1 °C)-98.7 °F (37.1 °C)] 97.1 °F (36.2 °C)  Heart Rate:  [76-97] 97  Resp:  [16-18] 16  BP: ()/(52-65) 111/65    Physical Exam  Constitutional:       Appearance: She is not ill-appearing or toxic-appearing.   Abdominal:      General: Abdomen is protuberant. There is distension.      Palpations: Abdomen is soft.      Tenderness: There is generalized abdominal tenderness (Appropriate postop tenderness).   Neurological:      Mental Status: She is alert.   Psychiatric:         Behavior: Behavior is cooperative.         Results:    WBC is 6.01.  H/H is 8.6/26.1.    Impression/Plan:    The patient is POD 9 from a right hemicolectomy for a cecal cancer.  She continues to have expected postop ileus.  We will change the Dulcolax suppository from as needed to daily to try to stimulate GI activity.    Baldemar Griffin Jr., M.D.    Electronically signed by Baldemar Griffin Jr., MD at 21     Wil Shields MD at 21 1707              Name: Jaqueline Rojas ADMIT: 2021   : 1924  PCP: Provider, No Known    MRN: [id number] LOS: 18 days   AGE/SEX: 89 y.o. female  ROOM: [facility]     Subjective   Subjective   Says her pain has been worse over the last 24 hours.  Mostly points to her epigastric area.  Family says that she has not been urinating as much    Review of Systems   Gastrointestinal: Positive for abdominal distention and nausea.       Objective   Objective      Vital Signs  Temp:  [97 °F (36.1 °C)-98.7 °F (37.1 °C)] 98.7 °F (37.1 °C)  Heart Rate:  [76-86] 86  Resp:  [16-18] 18  BP: ()/(52-62) 101/56  SpO2:  [95 %-97 %] 95 %  on   ;   Device (Oxygen Therapy): room air  Body mass index is 20.76 kg/m².  Physical Exam  Vitals and nursing note reviewed.   Constitutional:       Appearance: Normal appearance.   HENT:      Head: Normocephalic and atraumatic.   Cardiovascular:      Rate and Rhythm: Normal rate and regular rhythm.      Heart sounds: No murmur heard.     Pulmonary:      Effort: Pulmonary effort is normal.      Breath sounds: Normal breath sounds.   Abdominal:      General: There is distension.      Palpations: Abdomen is soft.      Tenderness: There is abdominal tenderness.      Comments: Hypoactive bowel sounds   Musculoskeletal:      Right lower leg: No edema.      Left lower leg: No edema.   Skin:     General: Skin is warm and dry.      Findings: No rash.   Neurological:      General: No focal deficit present.      Mental Status: She is alert and oriented to person, place, and time.   Psychiatric:         Mood and Affect: Mood normal.         Behavior: Behavior normal.         Results Review     I reviewed the patient's new clinical results.  Results from last 7 days   Lab Units 06/11/21  0450 06/10/21  0433 06/09/21  0944 06/08/21  0635   WBC 10*3/mm3 6.01 6.50 7.92 7.12   HEMOGLOBIN g/dL 8.6* 9.2* 9.9* 10.0*   PLATELETS 10*3/mm3 247 287 350 351     Results from last 7 days   Lab Units 06/11/21  0602 06/10/21  0433 06/09/21  0944 06/08/21  0635   SODIUM mmol/L 131* 133* 132* 133*   POTASSIUM mmol/L 3.9 3.9 4.2 4.4   CHLORIDE mmol/L 103 100 100 102   CO2 mmol/L 24.3 25.5 23.6 26.2   BUN mg/dL 16 12 12 11   CREATININE mg/dL 0.33* 0.32* 0.39* 0.38*   GLUCOSE mg/dL 175* 169* 203* 174*   Estimated Creatinine Clearance: 35.6 mL/min (A) (by C-G formula based on SCr of 0.33 mg/dL (L)).  Results from last 7 days   Lab Units 06/08/21  0635 06/07/21  0701  06/06/21  0654   ALBUMIN g/dL 2.70* 2.80* 2.20*   BILIRUBIN mg/dL 0.3 0.3  --    ALK PHOS U/L 44 43  --    AST (SGOT) U/L 13 13  --    ALT (SGPT) U/L 9 11  --      Results from last 7 days   Lab Units 06/11/21  0602 06/11/21  0450 06/10/21  0433 06/09/21  0944 06/08/21  0635 06/07/21  0701 06/06/21  0654   CALCIUM mg/dL 7.8*  --  8.0* 8.0* 8.3 8.1* 7.8*   ALBUMIN g/dL  --   --   --   --  2.70* 2.80* 2.20*   MAGNESIUM mg/dL  --  1.3* 2.0 2.0 2.0 1.9 2.0   PHOSPHORUS mg/dL 2.5  --  2.7 3.1 2.3* 2.7 1.9*       COVID19   Date Value Ref Range Status   06/01/2021 Not Detected Not Detected - Ref. Range Final   05/28/2021 Not Detected Not Detected - Ref. Range Final     No results found for: HGBA1C, POCGLU    XR Abdomen KUB  ONE VIEW PORTABLE ABDOMEN AT 10:38 AM     HISTORY: Recent abdominal surgery. Follow up of ileus.     FINDINGS: There is considerable bowel gas scattered in both large and  small bowel most consistent with postoperative ileus and showing no  change from 3 days ago. The gas-filled transverse colon measures up to  5.5 cm in diameter which is unchanged.     This report was finalized on 6/9/2021 1:07 PM by Dr. Ivan Johnson M.D.       Scheduled Medications  aspirin, 81 mg, Oral, Daily  digoxin, 0.5 mg, Intravenous, Once  famotidine, 20 mg, Intravenous, Daily  Fat Emul Fish Oil/Plant Based, 100 mL, Intravenous, Once per day on Mon Wed Fri  metoprolol tartrate, 12.5 mg, Oral, Q12H  multivitamin, 1 tablet, Oral, Daily  sodium chloride, 10 mL, Intravenous, Q12H  sodium chloride, 10 mL, Intravenous, Q12H  sodium chloride, 10 mL, Intravenous, Q12H    Infusions  Adult Central 2-in-1 TPN, , Last Rate: 60 mL/hr at 06/10/21 1810  Adult Central 2-in-1 TPN,   Pharmacy to Dose TPN,     Diet  Diet Full Liquid  Adult Central 2-in-1 TPN  Adult Central 2-in-1 TPN      Assessment/Plan     Active Hospital Problems    Diagnosis  POA   • **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]  Yes   • Cecal cancer (CMS/HCC) [C18.0]  Yes   • Postoperative ileus  (CMS/HCC) [K91.89, K56.7]  Unknown   • Hyponatremia [E87.1]  Yes   • 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    · Pain seems to be worse over the last 24 hours.We will recheck KUB again though at last check just showed persistent ileus and I suspect will continue to do so  · Check postvoid residual as family has noted less urine output and this could also cause some discomfort and distention  · Continue TPN and diet as tolerated  · Pharmacy to replace electrolytes via TPN as needed  · Encouraged her to continue to ambulate with PT  · SCDs for DVT prophylaxis.  · Limited code (no CPR, no intubation).  · Dispo: Eventual DC home planned when GI function returns and joe PO      Wil Shields MD  [facility]  06/11/21  17:08 EDT      Electronically signed by Wil Shields MD at 06/11/21 1121         Chelsea Boone, RN   Registered Nurse      Plan of Care   Signed   Date of Service:  06/13/21 1527   Creation Time:  06/13/21 1527            Signed             Goal Outcome Evaluation:  Plan of Care Reviewed With: patient, daughter  Progress: improving  Outcome Summary: hgb 8.3, no c/o pain, has had 2 bm's today, refused suppository, started miralax, picc line dressing changed, good blood return, per MD staples will be removed prior  to discharge possibly tomorrow continue to monitor

## 2021-06-14 NOTE — CASE MANAGEMENT/SOCIAL WORK
Case Management Discharge Note      Final Note: Home with assist of daughter. No needs noted. Transport per private auto.    Provided Post Acute Provider List?: Yes  Post Acute Provider List: Home Health, Nursing Home  Provided Post Acute Provider Quality & Resource List?: Yes  Post Acute Provider Quality and Resource List: Home Health  Delivered To: Patient  Method of Delivery: In person    Selected Continued Care - Admitted Since 5/24/2021     Destination    No services have been selected for the patient.              Durable Medical Equipment    No services have been selected for the patient.              Dialysis/Infusion    No services have been selected for the patient.              Home Medical Care    No services have been selected for the patient.              Therapy    No services have been selected for the patient.              Community Resources    No services have been selected for the patient.              Community & DME    No services have been selected for the patient.                       Final Discharge Disposition Code: 01 - home or self-care

## 2021-06-14 NOTE — DISCHARGE SUMMARY
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 Rojas is a 89 y.o. female with a history of SVT who presented to [facility] initially complaining of mostly right-sided abdominal pain and nausea.  Please see the admitting history and physical for further details.  Initial imaging looked most consistent with focal colitis or enteritis in the right side of the colon and terminal ileum, concerning for ischemic colitis but could not rule out neoplasm.  She was started on IV antibiotics.  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  More significantly the CTA showed abnormal masslike thickening in the terminal ileum ileocecal junction and cecum with progressive wall thickening with some gas bubbles, as well as new ascites and moderate bilateral pleural effusions.  Some small pulmonary nodules were also seen.  Surgical consultation was obtained and they thought  she may have had a contained perforation initially but since she was clinically improving they elected to observe her after discussion with patient and her family.  They repeated CT scan 5/31 with findings more consistent with a lobulated tumor mass and low-grade obstruction rather than infectious.  In light of these findings and after discussion with family and patient they elected to proceed with right hemicolectomy which was performed 6/2.  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  Postoperatively she had the expected ileus which was fairly prolonged.  She required TPN as she was n.p.o. for several days so a PICC line was placed.  Slowly but surely her bowel function has returned.  Her appetite is still poor but she is tolerating it fine.  She is up and ambulatory with a walker and PT has signed off of her so she should be stable for discharge with family.  Otherwise she has been stable.  Cardiology did follow her initially due to some and episode of SVT but she is back on her carvedilol and is doing well from that standpoint without recurrence.  She will be discharged today if okay with surgery.      Day of Discharge     Subjective:  No new complaints.  Appetite still poor    Physical Exam:  Temp:  [97.1 °F (36.2 °C)-98 °F (36.7 °C)] 98 °F (36.7 °C)  Heart Rate:  [70-85] 77  Resp:  [14-16] 16  BP: (113-134)/(59-69) 134/59  Body mass index is 20.76 kg/m².  Physical Exam  Vitals and nursing note reviewed.   Constitutional:       Appearance: Normal appearance.   HENT:      Head: Normocephalic and atraumatic.   Cardiovascular:      Rate and Rhythm: Normal rate and regular rhythm.      Heart sounds: No murmur heard.     Pulmonary:      Effort: Pulmonary effort is normal.      Breath sounds: Normal breath sounds.   Abdominal:      General: Bowel sounds are normal. There is no distension.      Palpations: Abdomen is soft.      Tenderness: There is  abdominal tenderness.   Musculoskeletal:      Right lower leg: No edema.      Left lower leg: No edema.   Skin:     General: Skin is warm and dry.      Findings: No rash.   Neurological:      General: No focal deficit present.      Mental Status: She is alert and oriented to person, place, and time.   Psychiatric:         Mood and Affect: Mood normal.         Behavior: Behavior normal.         Consultants     Consult Orders (all) (From admission, onward)     Start     Ordered    06/06/21 1313  IV TEAM - Consult for PICC Line (IV Team to Determine Number of Lumens)  Once     Provider:  (Not yet assigned)    06/06/21 1313    05/28/21 1302  Inpatient General Surgery Consult  STAT     Specialty:  General Surgery  Provider:  Franky Hernandez MD    05/28/21 1301    05/28/21 1300  Inpatient General Surgery Consult  Once,   Status:  Canceled     Specialty:  General Surgery  Provider:  Franky Hernandez MD    05/28/21 1300    05/27/21 1508  Inpatient Palliative Care Nurse Consult  Once,   Status:  Canceled     Provider:  (Not yet assigned)    05/27/21 1508    05/25/21 1427  Inpatient Cardiology Consult  Once     Specialty:  Cardiology  Provider:  Marcell Muñoz MD    05/25/21 1438    05/25/21 1302  Inpatient Gastroenterology Consult  Once     Specialty:  Gastroenterology  Provider:  Doris Qurioga MD    05/25/21 1303    05/24/21 2024  [facility] (on-call MD unless specified) Details  Once     Specialty:  Hospitalist  Provider:  (Not yet assigned)    05/24/21 2023              Procedures     COLON RESECTION RIGHT      Imaging Results (All)     Procedure Component Value Units Date/Time    XR Abdomen KUB [[id number]] Collected: 06/11/21 1920     Updated: 06/11/21 1927    Narrative:      ABDOMEN KUB     CLINICAL HISTORY: Abdominal distention. Worsening abdominal pain. Postop  resection of cecal carcinoma.     Compared to the previous abdomen KUB dated 06/09/2021.     Again seen are multiple segments of mildly dilated air are  filled small  and large bowel within the abdomen and pelvis consistent with adynamic  ileus. The overall degree of bowel distention is unchanged. There is no  obvious free air on these supine views. Surgical skin staples are noted  along the midline.     This report was finalized on 6/11/2021 7:24 PM by Dr. Anil Arhtur M.D.       XR Abdomen KUB [[id number]] Collected: 06/09/21 1209     Updated: 06/09/21 1310    Narrative:      ONE VIEW PORTABLE ABDOMEN AT 10:38 AM     HISTORY: Recent abdominal surgery. Follow up of ileus.     FINDINGS: There is considerable bowel gas scattered in both large and  small bowel most consistent with postoperative ileus and showing no  change from 3 days ago. The gas-filled transverse colon measures up to  5.5 cm in diameter which is unchanged.     This report was finalized on 6/9/2021 1:07 PM by Dr. Ivan Johnson M.D.       XR Abdomen KUB [[id number]] Collected: 06/06/21 0534     Updated: 06/06/21 0534    Narrative:        Patient: NANCI BRIDGES  Time Out: 05:34  Exam(s): FILM ABDOMEN     EXAM:    XR Abdomen, 2 Views    CLINICAL HISTORY:     Reason for exam: post op ileus.    TECHNIQUE:    Frontal view of the abdomen pelvis with upright view of the abdomen.    COMPARISON:    No relevant prior studies available.    FINDINGS:    Intraperitoneal space:  No free air.    Gastrointestinal tract:  Unremarkable.  No dilation.  Air-filled loops   of small and large bowel with small bowel loops measuring up to 3.3 cm   and large bowel loops measuring up to 6.3 cm.    Bones joints:  Unremarkable.    IMPRESSION:       Air-filled loops of small and large bowel consistent with postoperative   ileus.  No free air under the hemidiaphragms.    Impression:          Electronically signed by Sandro Sotomayor M.D. on 06-06-21 at 0534    CT Abdomen Pelvis With Contrast [[id number]] Collected: 05/31/21 1339     Updated: 05/31/21 1407    Narrative:      CT ABDOMEN PELVIS W CONTRAST-     CLINICAL HISTORY:  Colitis with possible contained perforation. Patient  improving clinically and afebrile.     TECHNIQUE: Spiral CT images were acquired through the abdomen and pelvis  with IV and oral contrast and were reconstructed in 3 mm thick slices.     Radiation dose reduction techniques were utilized, including automated  exposure control and exposure modulation based on body size.     COMPARISON: CT scan of the abdomen and pelvis dated 05/24/2021.     FINDINGS: Images through the lung bases demonstrate small bilateral  posteriorly layering pleural effusions that are new since the preceding  CT scan. The liver, spleen, pancreas, and adrenal glands are  unremarkable. There is a single small simple cyst in the left kidney.  The kidneys are otherwise unremarkable.     The stomach and small bowel are well opacified with oral contrast. There  is slight dilatation of the small bowel. There is extensive lobulated  wall thickening involving the cecum that appears somewhat more prominent  and masslike than on the previous CT scan. The findings are most  consistent with a colon carcinoma. This produces marked luminal  narrowing within the cecum, and is likely resulting in the mild small  bowel dilatation. The cecum/mass measures approximately 6.0 x 4.9 cm in  diameter. The remainder of the colon is contracted and contains very  little formed stool and air. There is no evidence of diffuse colitis. No  extraluminal air is identified in the abdomen or pelvis. There are  numerous diverticuli in the sigmoid colon. There is no CT evidence of  diverticulitis. No lymphadenopathy is identified. There is a small to  moderate amount of ascites adjacent to the liver and spleen and in the  pelvis that has developed since the preceding CT scan. Peritoneal  carcinomatosis cannot be excluded. However, no definite masses are  identified in the peritoneal cavity.       Impression:      Probable lobulated tumor mass within the cecum consistent  with  colon carcinoma producing marked luminal narrowing within the cecum  and likely low-grade obstruction, with multiple segments of mildly  distended small bowel in the abdomen and pelvis. There is no definite  evidence of metastatic disease within the abdomen or pelvis. However, a  small-to-moderate amount of ascites has developed since the preceding CT  scan dated 05/24/2021. Therefore, peritoneal carcinomatosis  cannot be  entirely excluded. Small bilateral pleural effusions have also  developed.     These findings were discussed with Dr. Marcelo Griffin on 05/31/2021 at 2:00  PM     This report was finalized on 5/31/2021 2:04 PM by Dr. Anil Arthur M.D.       CT Angiogram Chest With & Without Contrast [[id number]] Collected: 05/28/21 1245     Updated: 05/28/21 1258    Narrative:      CT ANGIOGRAM CHEST, ABDOMEN AND PELVIS WITH IV CONTRAST     HISTORY: 89-year-old female with elevated D-dimer. Evaluate celiac and  mesenteric arteries and evaluate for pulmonary embolus.     TECHNIQUE: CT angiogram includes imaging from the thoracic inlet to the  trochanters with IV contrast in the arterial phase of contrast. Data  reconstructed in coronal and sagittal planes. As part of the technique  for this CT exam, radiation dose reduction techniques were utilized,  including automated exposure control and exposure modulation based on  body size.     COMPARISON: CT abdomen and pelvis with IV contrast 05/24/2021.     FINDINGS: There is no intrapulmonary arterial filling defect to diagnose  pulmonary embolus. There is mild pulmonary arterial enlargement. Mild  atherosclerotic calcification is present involving the thoracic aorta  and great vessels. The left gastric artery, splenic artery, common  hepatic artery arises directly from the aortic arch. There is mild  narrowing of the origin of the common hepatic artery and there is  moderate stenosis of the origin of the splenic artery associated with  partially calcified plaque. There  is a partially peripherally calcified  splenic artery aneurysm measuring 14 x 11 mm. The superior mesenteric  artery and the inferior mesenteric artery are patent. There is mild  calcified plaque involving the proximal left and right renal arteries  with moderate stenosis of the origin of the right renal artery. The left  renal artery is patent. Atherosclerotic calcification is present  involving the abdominal aorta which measures up to 2 x 1.7 cm. Both  common iliac arteries and internal and external iliac arteries and  common femoral arteries are patent. The proximal superficial and deep  femoral arteries are patent.     NONANGIOGRAM FINDINGS: Moderate bilateral pleural effusions layered  apparently. There is dense, linear lingular opacity due to atelectasis  or mild consolidation. This is without change. 5 mm subpleural right  lower lobe pulmonary nodule is present on image 88. Within the medial  left upper lobe on image 25 there is a 6 mm nodular opacity. This may be  related to scarring and there is mild biapical pleural parenchymal  scarring. There is a kyphotic curvature of the thoracic spine associated  multilevel degenerative disc disease and disc space narrowing and there  is partial bony fusion of the T5-T6-T7 vertebral bodies.     Since the exam 4 days ago there has developed ascites and there is new  perihepatic fluid extending anterior to the liver that appears partly  loculated and measures 1.7 cm in AP dimension. This exhibits mild  peripheral enhancement suggesting peritonitis. There is also mild free  fluid extending into the pelvis. Spleen, pancreas, adrenal glands,  kidneys exhibit no interval change or acute abnormality. A left lower  pole renal cyst is partially exophytic anteriorly measures 2.3 cm. There  is no hydronephrosis.     There is progressive abnormal, masslike bowel wall thickening within the  right lower quadrant. Wall thickening is present involving the terminal  ileum and  ileocecal junction and the cecum. This area of masslike wall  thickening measures approximately 5.1 x 4.4 cm. Anteromedial to the  involved segments, there are bubbles of gas that are not clearly  intraluminal suspicious for bowel perforation with developing collection  extending anterior and inferior to the involved bowel segments. Normal  appendix is not visualized. There is no mature collection in this  region. There is surrounding stranding and haziness within the  mesentery. There is mild gas distention of bowel loops potentially due  to mild ileus. There is an L1 vertebral body hemangioma. Chronic  bilateral L5 pars defects are present and there is grade 1 and  borderline grade 2 spondylolisthesis of L5 with respect to S1.       Impression:      1. Abnormal masslike wall thickening of the region of the terminal  ileum, ileocecal junction, cecum with evidence for progression in wall  thickening when compared to the previous exam. There are small bubbles  of adjacent gas which are not clearly intraluminal raising the  possibility of a bowel perforation without evidence for drainable or  mature collection in this region. However, there has developed new  ascites and there is a apparent partly loculated perihepatic fluid  extending anterior to the liver exhibits with thin band of surrounding  enhancement and this may reflect peritonitis.  2. Celiac artery variant anatomy as the common hepatic artery, left  gastric artery, splenic artery arise directly from the abdominal aorta.  There is atherosclerotic disease with mild narrowing of the origin of  the common hepatic artery and moderate stenosis of the origin of the  splenic artery. The SMA and TERESITA are patent.  3. Moderate bilateral pleural effusions layer dependently. Dense, linear  lingular opacity is without change. 5 mm subpleural noncalcified right  lower lobe pulmonary nodule. 6 mm nodular opacity medial left upper  lobe.  4. Partially peripherally calcified  splenic artery aneurysm measuring 14  x 11 mm.  5. No evidence for pulmonary thromboembolic disease.     Discussed with Dr. Pond 5/20/2021 at 12:30 PM.     This report was finalized on 5/28/2021 12:55 PM by Dr. Kentrell Shepherd M.D.       CT Angiogram Abdomen Pelvis [[id number]] Collected: 05/28/21 1245     Updated: 05/28/21 1258    Narrative:      CT ANGIOGRAM CHEST, ABDOMEN AND PELVIS WITH IV CONTRAST     HISTORY: 89-year-old female with elevated D-dimer. Evaluate celiac and  mesenteric arteries and evaluate for pulmonary embolus.     TECHNIQUE: CT angiogram includes imaging from the thoracic inlet to the  trochanters with IV contrast in the arterial phase of contrast. Data  reconstructed in coronal and sagittal planes. As part of the technique  for this CT exam, radiation dose reduction techniques were utilized,  including automated exposure control and exposure modulation based on  body size.     COMPARISON: CT abdomen and pelvis with IV contrast 05/24/2021.     FINDINGS: There is no intrapulmonary arterial filling defect to diagnose  pulmonary embolus. There is mild pulmonary arterial enlargement. Mild  atherosclerotic calcification is present involving the thoracic aorta  and great vessels. The left gastric artery, splenic artery, common  hepatic artery arises directly from the aortic arch. There is mild  narrowing of the origin of the common hepatic artery and there is  moderate stenosis of the origin of the splenic artery associated with  partially calcified plaque. 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 The superior mesenteric  artery and the inferior mesenteric artery are patent. There is mild  calcified plaque involving the proximal left and right renal arteries  with moderate stenosis of the origin of the right renal artery. The left  renal artery is patent. Atherosclerotic calcification is present  involving the abdominal aorta which measures up  to 2 x 1.7 cm. Both  common iliac arteries and internal and external iliac arteries and  common femoral arteries are patent. The proximal superficial and deep  femoral arteries are patent.     NONANGIOGRAM FINDINGS: Moderate bilateral pleural effusions layered  apparently. There is dense, linear lingular opacity due to atelectasis  or mild consolidation. This is without change. 5 mm subpleural right  lower lobe pulmonary nodule is present on image 88. Within the medial  left upper lobe on image 25 there is a 6 mm nodular opacity. This may be  related to scarring and there is mild biapical pleural parenchymal  scarring. There is a kyphotic curvature of the thoracic spine associated  multilevel degenerative disc disease and disc space narrowing and there  is partial bony fusion of the T5-T6-T7 vertebral bodies.     Since the exam 4 days ago there has developed ascites and there is new  perihepatic fluid extending anterior to the liver that appears partly  loculated and measures 1.7 cm in AP dimension. This exhibits mild  peripheral enhancement suggesting peritonitis. There is also mild free  fluid extending into the pelvis. Spleen, pancreas, adrenal glands,  kidneys exhibit no interval change or acute abnormality. A left lower  pole renal cyst is partially exophytic anteriorly measures 2.3 cm. There  is no hydronephrosis.     There is progressive abnormal, masslike bowel wall thickening within the  right lower quadrant. Wall thickening is present involving the terminal  ileum and ileocecal junction and the cecum. This area of masslike wall  thickening measures approximately 5.1 x 4.4 cm. Anteromedial to the  involved segments, there are bubbles of gas that are not clearly  intraluminal suspicious for bowel perforation with developing collection  extending anterior and inferior to the involved bowel segments. Normal  appendix is not visualized. There is no mature collection in this  region. There is surrounding  stranding and haziness within the  mesentery. There is mild gas distention of bowel loops potentially due  to mild ileus. There is an L1 vertebral body hemangioma. Chronic  bilateral L5 pars defects are present and there is grade 1 and  borderline grade 2 spondylolisthesis of L5 with respect to S1.       Impression:      1. Abnormal masslike wall thickening of the region of the terminal  ileum, ileocecal junction, cecum with evidence for progression in wall  thickening when compared to the previous exam. There are small bubbles  of adjacent gas which are not clearly intraluminal raising the  possibility of a bowel perforation without evidence for drainable or  mature collection in this region. However, there has developed new  ascites and there is a apparent partly loculated perihepatic fluid  extending anterior to the liver exhibits with thin band of surrounding  enhancement and this may reflect peritonitis.  2. Celiac artery variant anatomy as the common hepatic artery, left  gastric artery, splenic artery arise directly from the abdominal aorta.  There is atherosclerotic disease with mild narrowing of the origin of  the common hepatic artery and moderate stenosis of the origin of the  splenic artery. The SMA and TERESITA are patent.  3. Moderate bilateral pleural effusions layer dependently. Dense, linear  lingular opacity is without change. 5 mm subpleural noncalcified right  lower lobe pulmonary nodule. 6 mm nodular opacity medial left upper  lobe.  4. Partially peripherally calcified splenic artery aneurysm measuring 14  x 11 mm.  5. No evidence for pulmonary thromboembolic disease.     Discussed with Dr. Pond 5/20/2021 at 12:30 PM.     This report was finalized on 5/28/2021 12:55 PM by Dr. Kentrell Shepherd M.D.       CT Abdomen Pelvis With Contrast [[id number]] Collected: 05/24/21 2053     Updated: 05/26/21 0959    Narrative:      CT OF THE ABDOMEN AND PELVIS WITH CONTRAST 05/24/2021     HISTORY: Abdominal  pain.     TECHNIQUE: Axial images were obtained from the lung bases to the  symphysis pubis after intravenous contrast. No oral contrast was given.     FINDINGS: The liver, gallbladder, spleen, pancreas, adrenals and kidneys  appear within normal limits except for renal cysts.     There is some tortuosity of the cecum and proximal ascending colon with  some wall thickening of the cecum best seen on coronal reconstruction  images 31 through 50. There is mild wall thickening of the terminal  ileum as well seen on images 92 through 103. No other bowel wall  thickening is seen. A few segments of small bowel in the pelvis appear  at the upper limits of normal for diameter but no bowel dilatation is  seen. No free air is seen.     The appendix is not clearly seen.     The uterus appears within normal limits for age. Urinary bladder is  unremarkable.     Bilateral L5 spondylolysis is seen with mild anterolisthesis of L5 on  S1.       Impression:      1. Tortuosity of the cecum and proximal ascending colon with some wall  thickening. There is some mild wall thickening of the terminal ileum as  well. Findings could be related to focal colitis/enteritis (which could  conceivably be ischemic) though there could be neoplastic involvement.  Correlation with a colonoscopy may be helpful if clinically indicated.  2. A few segments of small bowel are at the upper limits of normal for  diameter but no bowel dilatation or free air is seen. If clinically  indicated follow-up plain radiographs may be helpful.     Radiation dose reduction techniques were utilized, including automated  exposure control and exposure modulation based on body size.     This report was finalized on 5/26/2021 9:56 AM by Dr. Romeo Vann M.D.           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  · No  hemodynamically significant stenosis of the superior mesenteric artery (SMA) is noted.  · No hemodynamically significant stenosis of the inferior mesenteric artery (TERESITA) is noted.      Pertinent Labs     Results from last 7 days   Lab Units 06/14/21  0629 06/13/21  0600 06/12/21  0749 06/12/21  0514 06/11/21  0450   WBC 10*3/mm3 4.80 4.91  --  4.62 6.01   HEMOGLOBIN g/dL 8.1* 8.3* 8.1* 7.0* 8.6*   PLATELETS 10*3/mm3 292 236  --  201 247     Results from last 7 days   Lab Units 06/14/21  0629 06/13/21  0600 06/12/21  0514 06/11/21  0602   SODIUM mmol/L 131* 133* 131* 131*   POTASSIUM mmol/L 4.1 4.2 3.9 3.9   CHLORIDE mmol/L 97* 101 100 103   CO2 mmol/L 25.8 28.1 26.0 24.3   BUN mg/dL 14 12 13 16   CREATININE mg/dL 0.47* 0.33* 0.33* 0.33*   GLUCOSE mg/dL 103* 107* 198* 175*   Estimated Creatinine Clearance: 35.6 mL/min (A) (by C-G formula based on SCr of 0.47 mg/dL (L)).  Results from last 7 days   Lab Units 06/12/21  0514 06/08/21  0635   ALBUMIN g/dL 2.50* 2.70*   BILIRUBIN mg/dL 0.2 0.3   ALK PHOS U/L 53 44   AST (SGOT) U/L 12 13   ALT (SGPT) U/L 8 9     Results from last 7 days   Lab Units 06/14/21  0629 06/13/21  0600 06/12/21  0514 06/11/21  0602 06/11/21  0450 06/10/21  0433 06/09/21  0944 06/08/21  0635   CALCIUM mg/dL 8.3 8.1* 7.8* 7.8*  --  8.0* 8.0* 8.3   ALBUMIN g/dL  --   --  2.50*  --   --   --   --  2.70*   MAGNESIUM mg/dL  --   --  1.8  --  1.3* 2.0 2.0 2.0   PHOSPHORUS mg/dL  --   --  2.5 2.5  --  2.7 3.1 2.3*               Invalid input(s): LDLCALC          Test Results Pending at Discharge       Discharge Details        Discharge Medications      New Medications      Instructions Start Date   acetaminophen 325 MG tablet  Commonly known as: TYLENOL   650 mg, Oral, Every 4 Hours PRN      pantoprazole 40 MG EC tablet  Commonly known as: PROTONIX   40 mg, Oral, Daily      polyethylene glycol 17 g packet  Commonly known as: MIRALAX   17 g, Oral, Daily PRN         Changes to Medications      Instructions  Start Date   carvedilol 3.125 MG tablet  Commonly known as: COREG  What changed:   · when to take this  · Another medication with the same name was removed. Continue taking this medication, and follow the directions you see here.   3.125 mg, Oral, 2 Times Daily With Meals         Continue These Medications      Instructions Start Date   aspirin 81 MG EC tablet   81 mg, Oral, Daily      multivitamin tablet tablet  Generic drug: multivitamin   1 tablet, Oral, Daily             No Known Allergies      Discharge Disposition:  Home or Self Care    Discharge Diet:  Diet Order   Procedures   • Diet Regular; GI Soft       Discharge Activity:       CODE STATUS:    Code Status and Medical Interventions:   Ordered at: 05/26/21 1766     Limited Support to NOT Include:    Cardioversion/Defibrillation    Intubation     Level Of Support Discussed With:    Patient     Code Status:    No CPR     Medical Interventions (Level of Support Prior to Arrest):    Limited       No future appointments.  Follow-up Information     Provider, No Known Follow up.    Why: f/u with PCP in 2 weeks  Contact information:  [facility] [address]             Baldemar Griffin Jr., MD Follow up.    Specialty: General Surgery  Why: per his instructions  Contact information:  [facility] [address] [address] [phone]                   Time Spent on Discharge:  Greater than 30 minutes      Wil Shields MD  [facility]  06/14/21  09:09 EDT

## 2021-06-15 ENCOUNTER — READMISSION MANAGEMENT (OUTPATIENT)
Dept: CALL CENTER | Facility: HOSPITAL | Age: 86
End: 2021-06-15

## 2021-06-15 NOTE — OUTREACH NOTE
Prep Survey      Responses   [facility] patient discharged from?  [place]   Is LACE score < 7 ?  No   Emergency Room discharge w/ pulse ox?  No   Eligibility  Readm Mgmt   Discharge diagnosis  Cecum mass,  COLON RESECTION RIGHT   Does the patient have one of the following disease processes/diagnoses(primary or secondary)?  General Surgery   Does the patient have Home health ordered?  No   Is there a DME ordered?  No   Prep survey completed?  Yes          Elida Gonzalez RN

## 2021-06-18 ENCOUNTER — READMISSION MANAGEMENT (OUTPATIENT)
Dept: CALL CENTER | Facility: HOSPITAL | Age: 86
End: 2021-06-18

## 2021-06-18 NOTE — OUTREACH NOTE
General Surgery Week 1 Survey      Responses   [facility] patient discharged from?  [place]   Does the patient have one of the following disease processes/diagnoses(primary or secondary)?  General Surgery   Week 1 attempt successful?  No   Unsuccessful attempts  Attempt 1          Padmini Severino RN

## 2021-06-23 ENCOUNTER — READMISSION MANAGEMENT (OUTPATIENT)
Dept: CALL CENTER | Facility: HOSPITAL | Age: 86
End: 2021-06-23

## 2021-06-23 NOTE — OUTREACH NOTE
General Surgery Week 1 Survey      Responses   [facility] patient discharged from?  [place]   Does the patient have one of the following disease processes/diagnoses(primary or secondary)?  General Surgery   Week 1 attempt successful?  Yes   Call start time  1458   Call end time  1505   Discharge diagnosis  Cecum mass,  COLON RESECTION RIGHT   Is patient permission given to speak with other caregiver?  Yes   List who call center can speak with  Sejal   Person spoke with today (if not patient) and relationship  Sejal   Meds reviewed with patient/caregiver?  Yes   Is the patient having any side effects they believe may be caused by any medication additions or changes?  No   Does the patient have all medications related to this admission filled (includes all antibiotics, pain medications, etc.)  Yes   Is the patient taking all medications as directed (includes completed medication regime)?  Yes   Does the patient have a follow up appointment scheduled with their surgeon?  Yes   Has the patient kept scheduled appointments due by today?  N/A   Comments  Surgery appt July 1.    Has home health visited the patient within 72 hours of discharge?  N/A   Psychosocial issues?  No   Did the patient receive a copy of their discharge instructions?  Yes   Nursing interventions  Reviewed instructions with patient   What is the patient's perception of their health status since discharge?  Improving   Nursing interventions  Nurse provided patient education   Is the patient /caregiver able to teach back basic post-op care?  Take showers only when approved by MD-sponge bathe until then, No tub bath, swimming, or hot tub until instructed by MD, Keep incision areas clean,dry and protected, Lifting as instructed by MD in discharge instructions, Practice 'cough and deep breath'   Is the patient/caregiver able to teach back signs and symptoms of incisional infection?  Increased redness, swelling or pain at the incisonal site,  Increased drainage or bleeding, Incisional warmth, Pus or odor from incision, Fever   Is the patient/caregiver able to teach back steps to recovery at home?  Rest and rebuild strength, gradually increase activity, Eat a well-balance diet   If the patient is a current smoker, are they able to teach back resources for cessation?  Not a smoker   Is the patient/caregiver able to teach back the hierarchy of who to call/visit for symptoms/problems? PCP, Specialist, Home health nurse, Urgent Care, ED, 911  Yes   Additional teach back comments  Pt doing well and daughter caring for her. Pt seems to be eating and drinking better.    Week 1 call completed?  Yes          Padmini Severino RN

## 2021-06-30 ENCOUNTER — READMISSION MANAGEMENT (OUTPATIENT)
Dept: CALL CENTER | Facility: HOSPITAL | Age: 86
End: 2021-06-30

## 2021-06-30 NOTE — OUTREACH NOTE
General Surgery Week 2 Survey      Responses   [facility] patient discharged from?  [place]   Does the patient have one of the following disease processes/diagnoses(primary or secondary)?  General Surgery   Week 2 attempt successful?  No   Unsuccessful attempts  Attempt 1          Akanksha Knowles RN

## 2021-07-01 ENCOUNTER — OFFICE VISIT (OUTPATIENT)
Dept: SURGERY | Facility: CLINIC | Age: 86
End: 2021-07-01

## 2021-07-01 VITALS — WEIGHT: 99.4 LBS | BODY MASS INDEX: 16.97 KG/M2 | HEIGHT: 64 IN

## 2021-07-01 DIAGNOSIS — Z48.89 POSTOPERATIVE VISIT: Primary | ICD-10-CM

## 2021-07-01 PROCEDURE — 99024 POSTOP FOLLOW-UP VISIT: CPT | Performed by: SURGERY

## 2021-07-01 NOTE — PROGRESS NOTES
Subjective   Jaqueline Rojas is a 89 y.o. female who returns to the office after undergoing a right hemicolectomy on 6/2/2021.     History of Present Illness     The patient is recovering well with no significant postop symptoms.  She is having no abdominal pain.  Her appetite is decreased but slowly improving.  She is drinking protein shakes to supplement her nutrition.  She is having a bowel movement twice a day.  Her energy level is marginal but improving.    Review of Systems   Constitutional: Positive for fatigue. Negative for activity change, appetite change and fever.   Respiratory: Negative for chest tightness and shortness of breath.    Cardiovascular: Negative for chest pain and palpitations.   Gastrointestinal: Negative for abdominal pain, constipation, diarrhea and nausea.   Skin: Negative for rash and wound.   Psychiatric/Behavioral: Negative for agitation and confusion.       Objective   Physical Exam  Constitutional:       General: She is not in acute distress.     Appearance: Normal appearance. She is not ill-appearing or toxic-appearing.   Pulmonary:      Effort: Pulmonary effort is normal. No respiratory distress.   Abdominal:      Palpations: Abdomen is soft.      Tenderness: There is no abdominal tenderness.   Skin:     Comments: Incision: intact with no evidence of infection.   Neurological:      Mental Status: She is alert.   Psychiatric:         Behavior: Behavior normal.         Assessment/Plan   The encounter diagnosis was Postoperative visit.    The patient is recovering well from her right hemicolectomy.  She was encouraged to continue to use the supplemental protein shakes while her appetite is improving.  She will follow-up in 1 month.

## 2021-07-03 ENCOUNTER — READMISSION MANAGEMENT (OUTPATIENT)
Dept: CALL CENTER | Facility: HOSPITAL | Age: 86
End: 2021-07-03

## 2021-07-03 NOTE — OUTREACH NOTE
General Surgery Week 2 Survey      Responses   [facility] patient discharged from?  [place]   Does the patient have one of the following disease processes/diagnoses(primary or secondary)?  General Surgery   Week 2 attempt successful?  No   Unsuccessful attempts  Attempt 2          Akanksha Knowles RN

## 2021-07-09 ENCOUNTER — HOSPITAL ENCOUNTER (EMERGENCY)
Facility: HOSPITAL | Age: 86
Discharge: HOME | End: 2021-07-09
Attending: EMERGENCY MEDICINE | Admitting: EMERGENCY MEDICINE

## 2021-07-09 ENCOUNTER — APPOINTMENT (OUTPATIENT)
Dept: GENERAL RADIOLOGY | Facility: HOSPITAL | Age: 86
End: 2021-07-09

## 2021-07-09 VITALS
SYSTOLIC BLOOD PRESSURE: 83 MMHG | HEART RATE: 77 BPM | RESPIRATION RATE: 18 BRPM | DIASTOLIC BLOOD PRESSURE: 41 MMHG | TEMPERATURE: 97.9 F | OXYGEN SATURATION: 98 %

## 2021-07-09 DIAGNOSIS — I47.1 PSVT (PAROXYSMAL SUPRAVENTRICULAR TACHYCARDIA) (HCC): Primary | ICD-10-CM

## 2021-07-09 LAB
ALBUMIN SERPL-MCNC: 3.5 G/DL (ref 3.5–5.2)
ALBUMIN/GLOB SERPL: 1.2 G/DL
ALP SERPL-CCNC: 70 U/L (ref 39–117)
ALT SERPL W P-5'-P-CCNC: 14 U/L (ref 1–33)
ANION GAP SERPL CALCULATED.3IONS-SCNC: 7.3 MMOL/L (ref 5–15)
AST SERPL-CCNC: 25 U/L (ref 1–32)
BASOPHILS # BLD AUTO: 0.06 10*3/MM3 (ref 0–0.2)
BASOPHILS NFR BLD AUTO: 0.7 % (ref 0–1.5)
BILIRUB SERPL-MCNC: 0.3 MG/DL (ref 0–1.2)
BUN SERPL-MCNC: 26 MG/DL (ref 8–23)
BUN/CREAT SERPL: 33.8 (ref 7–25)
CALCIUM SPEC-SCNC: 9.2 MG/DL (ref 8.2–9.6)
CHLORIDE SERPL-SCNC: 104 MMOL/L (ref 98–107)
CO2 SERPL-SCNC: 23.7 MMOL/L (ref 22–29)
CREAT SERPL-MCNC: 0.77 MG/DL (ref 0.57–1)
DEPRECATED RDW RBC AUTO: 47.4 FL (ref 37–54)
EOSINOPHIL # BLD AUTO: 0.52 10*3/MM3 (ref 0–0.4)
EOSINOPHIL NFR BLD AUTO: 5.8 % (ref 0.3–6.2)
ERYTHROCYTE [DISTWIDTH] IN BLOOD BY AUTOMATED COUNT: 14.8 % (ref 12.3–15.4)
GFR SERPL CREATININE-BSD FRML MDRD: 70 ML/MIN/1.73
GLOBULIN UR ELPH-MCNC: 3 GM/DL
GLUCOSE SERPL-MCNC: 183 MG/DL (ref 65–99)
HCT VFR BLD AUTO: 35.4 % (ref 34–46.6)
HGB BLD-MCNC: 11.6 G/DL (ref 12–15.9)
IMM GRANULOCYTES # BLD AUTO: 0.04 10*3/MM3 (ref 0–0.05)
IMM GRANULOCYTES NFR BLD AUTO: 0.4 % (ref 0–0.5)
LYMPHOCYTES # BLD AUTO: 1.86 10*3/MM3 (ref 0.7–3.1)
LYMPHOCYTES NFR BLD AUTO: 20.8 % (ref 19.6–45.3)
MAGNESIUM SERPL-MCNC: 2.1 MG/DL (ref 1.7–2.3)
MCH RBC QN AUTO: 28.6 PG (ref 26.6–33)
MCHC RBC AUTO-ENTMCNC: 32.8 G/DL (ref 31.5–35.7)
MCV RBC AUTO: 87.2 FL (ref 79–97)
MONOCYTES # BLD AUTO: 0.8 10*3/MM3 (ref 0.1–0.9)
MONOCYTES NFR BLD AUTO: 8.9 % (ref 5–12)
NEUTROPHILS NFR BLD AUTO: 5.66 10*3/MM3 (ref 1.7–7)
NEUTROPHILS NFR BLD AUTO: 63.4 % (ref 42.7–76)
NRBC BLD AUTO-RTO: 0 /100 WBC (ref 0–0.2)
PLATELET # BLD AUTO: 249 10*3/MM3 (ref 140–450)
PMV BLD AUTO: 10.8 FL (ref 6–12)
POTASSIUM SERPL-SCNC: 5 MMOL/L (ref 3.5–5.2)
PROT SERPL-MCNC: 6.5 G/DL (ref 6–8.5)
QT INTERVAL: 297 MS
QT INTERVAL: 357 MS
RBC # BLD AUTO: 4.06 10*6/MM3 (ref 3.77–5.28)
SODIUM SERPL-SCNC: 135 MMOL/L (ref 136–145)
TROPONIN T SERPL-MCNC: <0.01 NG/ML (ref 0–0.03)
WBC # BLD AUTO: 8.94 10*3/MM3 (ref 3.4–10.8)

## 2021-07-09 PROCEDURE — 80053 COMPREHEN METABOLIC PANEL: CPT | Performed by: NURSE PRACTITIONER

## 2021-07-09 PROCEDURE — 93010 ELECTROCARDIOGRAM REPORT: CPT | Performed by: INTERNAL MEDICINE

## 2021-07-09 PROCEDURE — 71045 X-RAY EXAM CHEST 1 VIEW: CPT

## 2021-07-09 PROCEDURE — 84484 ASSAY OF TROPONIN QUANT: CPT | Performed by: NURSE PRACTITIONER

## 2021-07-09 PROCEDURE — 96374 THER/PROPH/DIAG INJ IV PUSH: CPT

## 2021-07-09 PROCEDURE — 93005 ELECTROCARDIOGRAM TRACING: CPT

## 2021-07-09 PROCEDURE — 99284 EMERGENCY DEPT VISIT MOD MDM: CPT

## 2021-07-09 PROCEDURE — 83735 ASSAY OF MAGNESIUM: CPT | Performed by: NURSE PRACTITIONER

## 2021-07-09 PROCEDURE — 99283 EMERGENCY DEPT VISIT LOW MDM: CPT

## 2021-07-09 PROCEDURE — 93005 ELECTROCARDIOGRAM TRACING: CPT | Performed by: NURSE PRACTITIONER

## 2021-07-09 PROCEDURE — 85025 COMPLETE CBC W/AUTO DIFF WBC: CPT | Performed by: NURSE PRACTITIONER

## 2021-07-09 PROCEDURE — 25010000002 ONDANSETRON PER 1 MG: Performed by: NURSE PRACTITIONER

## 2021-07-09 RX ORDER — ONDANSETRON 2 MG/ML
4 INJECTION INTRAMUSCULAR; INTRAVENOUS ONCE
Status: COMPLETED | OUTPATIENT
Start: 2021-07-09 | End: 2021-07-09

## 2021-07-09 RX ORDER — SODIUM CHLORIDE 0.9 % (FLUSH) 0.9 %
10 SYRINGE (ML) INJECTION AS NEEDED
Status: DISCONTINUED | OUTPATIENT
Start: 2021-07-09 | End: 2021-07-09 | Stop reason: HOSPADM

## 2021-07-09 RX ADMIN — SODIUM CHLORIDE 1000 ML: 9 INJECTION, SOLUTION INTRAVENOUS at 07:42

## 2021-07-09 RX ADMIN — ONDANSETRON 4 MG: 2 INJECTION INTRAMUSCULAR; INTRAVENOUS at 06:48

## 2021-07-09 NOTE — ED PROVIDER NOTES
EMERGENCY DEPARTMENT ENCOUNTER    Room Number:  13/13  Date of encounter:  7/9/2021  PCP: Provider, No Known  Historian: Patient      PPE    Patient was placed in face mask in first look. Patient was wearing facemask when I entered the room and throughout our encounter. I wore full protective equipment throughout this patient encounter including a face mask, and gloves. Hand hygiene was performed before donning protective equipment and after removal when leaving the room.        HPI:  Chief Complaint: Rapid heart rate  A complete HPI/ROS/PMH/PSH/SH/FH are unobtainable due to: Nothing    Context: Jaqueline Rojas is a 89 y.o. female who arrives to the ED via EMS from home where she lives with her daughter.  Patient presents with c/o rapid heart rate that woke her up around 2 AM this morning.   Patient also complains of nausea.  Patient states that she has had similar incidents in the past, and this feels very similar to those.  She states that her doctor told her that when this happened she could take an extra Coreg so she has taken a total of 3 since it started at 2 AM.  Patient denies fever, chills, chest pain, leg swelling, dizziness, weakness or any other symptoms.  Patient states that nothing makes the symptoms better and nothing worsens symptoms.          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/2021   • Iron deficiency anemia due to chronic blood loss 05/24/2021   • Cecum mass 05/31/2021   • Hyponatremia 06/04/2021   • 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     Resolved Ambulatory Problems     Diagnosis Date Noted   • Leukocytosis 05/25/2021   • Hyperglycemia 05/25/2021   • Hypokalemia 05/29/2021   • Postoperative ileus (CMS/[facility]) 06/10/2021     Past Medical History:   Diagnosis Date   • Afib (CMS/[facility])    • CANCINO (dyspnea on exertion)    • Frequent PVCs    • Hypotension    • Irregular heart beat    • Knee injury    • Mitral regurgitation    • PONV (postoperative nausea and vomiting)    • Wrist fracture          PAST SURGICAL HISTORY  Past Surgical History:   Procedure Laterality Date   • COLON RESECTION N/A 6/2/2021    Procedure: COLON RESECTION RIGHT;  Surgeon: Baldemar Griffin Jr., MD;  Location: [facility];  Service: General;  Laterality: N/A;   • MASTOID SURGERY     • SKIN GRAFT     • TONSILLECTOMY     • WRIST SURGERY           FAMILY HISTORY  Family History   Problem Relation Age of Onset   • Heart disease Mother    • Diabetes Mother    • Stroke Father    • Diabetes Brother          SOCIAL HISTORY  Social History     Socioeconomic History   • Marital status:      Spouse name: Not on file   • Number of children: Not on file   • Years of education: Not on file   • Highest education level: Not on file   Tobacco Use   • Smoking status: Never Smoker   • Smokeless tobacco: Never Used   • Tobacco comment: caffeine use - 1 cup coffee daily   Vaping Use   • Vaping Use: Never used   Substance and Sexual Activity   • Alcohol use: No   • Drug use: No   • Sexual activity: Defer         ALLERGIES  Patient has no known allergies.        REVIEW OF SYSTEMS  Review of Systems     All systems reviewed and negative except for those discussed in HPI.        PHYSICAL EXAM    ED Triage Vitals [07/09/21 0622]   Temp Heart Rate Resp BP SpO2   97.9 °F (36.6 °C) (!) 156 18 140/99 99 %       Physical Exam  GENERAL: Well appearing, non-toxic appearing, not distressed  HENT: normocephalic, atraumatic  EYES: no scleral icterus, PERRL  CV: regular rhythm, tachycardic,  no murmur  RESPIRATORY: normal effort, CTAB  ABDOMEN: soft, nontender  MUSCULOSKELETAL: no deformity, no lower extremity edema or calf tenderness bilaterally  NEURO: alert, moves all extremities, follows commands, mental status normal/baseline  SKIN: warm, dry, no rash   Psych: Appropriate mood and affect  Nursing notes and vital signs reviewed      LAB RESULTS  Recent Results (from the past 24 hour(s))   ECG 12 Lead    Collection Time: 07/09/21  6:35 AM   Result Value Ref Range    QT Interval 297 ms   ECG 12 Lead    Collection Time: 07/09/21  6:46 AM   Result Value Ref Range    QT Interval 357 ms   Comprehensive Metabolic Panel    Collection Time: 07/09/21  6:49 AM    Specimen: Blood   Result Value Ref Range    Glucose 183 (H) 65 - 99 mg/dL    BUN 26 (H) 8 - 23 mg/dL    Creatinine 0.77 0.57 - 1.00 mg/dL    Sodium 135 (L) 136 - 145 mmol/L    Potassium 5.0 3.5 - 5.2 mmol/L    Chloride 104 98 - 107 mmol/L    CO2 23.7 22.0 - 29.0 mmol/L    Calcium 9.2 8.2 - 9.6 mg/dL    Total Protein 6.5 6.0 - 8.5 g/dL    Albumin 3.50 3.50 - 5.20 g/dL    ALT (SGPT) 14 1 - 33 U/L    AST (SGOT) 25 1 - 32 U/L    Alkaline Phosphatase 70 39 - 117 U/L    Total Bilirubin 0.3 0.0 - 1.2 mg/dL    eGFR Non African Amer 70 >60 mL/min/1.73    Globulin 3.0 gm/dL    A/G Ratio 1.2 g/dL    BUN/Creatinine Ratio 33.8 (H) 7.0 - 25.0    Anion Gap 7.3 5.0 - 15.0 mmol/L   Troponin    Collection Time: 07/09/21  6:49 AM    Specimen: Blood   Result Value Ref Range    Troponin T <0.010 0.000 - 0.030 ng/mL   Magnesium    Collection Time: 07/09/21  6:49 AM    Specimen: Blood   Result Value Ref Range    Magnesium 2.1 1.7 - 2.3 mg/dL   CBC Auto Differential    Collection Time: 07/09/21  6:49 AM    Specimen: Blood   Result Value Ref Range    WBC 8.94 3.40 - 10.80 10*3/mm3    RBC 4.06 3.77 - 5.28 10*6/mm3    Hemoglobin 11.6 (L) 12.0 - 15.9 g/dL    Hematocrit 35.4 34.0 - 46.6 %    MCV 87.2 79.0 - 97.0 fL    MCH 28.6 26.6 - 33.0 pg    MCHC 32.8 31.5 - 35.7 g/dL    RDW  14.8 12.3 - 15.4 %    RDW-SD 47.4 37.0 - 54.0 fl    MPV 10.8 6.0 - 12.0 fL    Platelets 249 140 - 450 10*3/mm3    Neutrophil % 63.4 42.7 - 76.0 %    Lymphocyte % 20.8 19.6 - 45.3 %    Monocyte % 8.9 5.0 - 12.0 %    Eosinophil % 5.8 0.3 - 6.2 %    Basophil % 0.7 0.0 - 1.5 %    Immature Grans % 0.4 0.0 - 0.5 %    Neutrophils, Absolute 5.66 1.70 - 7.00 10*3/mm3    Lymphocytes, Absolute 1.86 0.70 - 3.10 10*3/mm3    Monocytes, Absolute 0.80 0.10 - 0.90 10*3/mm3    Eosinophils, Absolute 0.52 (H) 0.00 - 0.40 10*3/mm3    Basophils, Absolute 0.06 0.00 - 0.20 10*3/mm3    Immature Grans, Absolute 0.04 0.00 - 0.05 10*3/mm3    nRBC 0.0 0.0 - 0.2 /100 WBC       Ordered the above labs and independently reviewed the results.      RADIOLOGY  XR Chest 1 View    Result Date: 7/9/2021  PORTABLE CHEST  HISTORY: Tachycardia.  COMPARISON: Chest x-ray 09/25/2020.  FINDINGS: The patient is rotated to the left. The heart is within normal limits in size. There is mild bibasilar atelectasis/infiltrates, similar in appearance compared to prior examination. There is no evidence of consolidation or gross effusion. There is no evidence of congestive failure.        I ordered the above noted radiological studies and viewed the images on the PACS system.       EKG      Independently viewed by me and interpreted by Dr Burroughs         MEDICAL RECORD REVIEW  Medical records reviewed in epic patient was discharged June 14th from this hospital after being admitted for cecal mass, cecal cancer, hyponatremia, anemia  Hospital Course      Ms. Rojas is a 89 y.o. female with a history of SVT who presented to [facility] initially complaining of mostly right-sided abdominal pain and nausea.  Please see the admitting history and physical for further details.  Initial imaging looked most consistent with focal colitis or enteritis in the right side of the colon and terminal ileum, concerning for ischemic colitis but could not rule out neoplasm.  She  was started on IV antibiotics.  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  More significantly the CTA showed abnormal masslike thickening in the terminal ileum ileocecal junction and cecum with progressive wall thickening with some gas bubbles, as well as new ascites and moderate bilateral pleural effusions.  Some small pulmonary nodules were also seen.  Surgical consultation was obtained and they thought she may have had a contained perforation initially but since she was clinically improving they elected to observe her after discussion with patient and her family.  They repeated CT scan 5/31 with findings more consistent with a lobulated tumor mass and low-grade obstruction rather than infectious.  In light of these findings and after discussion with family and patient they elected to proceed with right hemicolectomy which was performed 6/2.  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  Postoperatively she had the expected ileus which was fairly prolonged.  She required TPN as she was n.p.o. for several days so a PICC line was placed.  Slowly but surely her bowel function has returned.  Her appetite is still poor but she is tolerating it fine.  She is up and ambulatory with a walker and PT has signed off of her so she should be stable for discharge with family.  Otherwise she has been stable.  Cardiology did follow her initially due to some and episode of SVT but she is back on her carvedilol and is doing well from that standpoint without recurrence.  She will be discharged today if okay with surgery      PROCEDURES    Procedures        DIFFERENTIAL DIAGNOSIS  Differential diagnosis include but are not limited to the following: ARIANNA, FAB Levine-ernie, lecture light  abnormality, chest pain      PROGRESS, DATA ANALYSIS, CONSULTS, AND MEDICAL DECISION MAKING        ED Course as of Jul 09 0854   Fri Jul 09, 2021   0644 Discussed pertinent information from history and physical exam with patient.  Discussed differential diagnosis and plan for ED evaluation/work-up and treatment including labs, EKG.  All questions answered.  Patient is agreeable with this plan.        [MS]   0706 RN notified me that patient has converted to sinus rhythm with a rate of 94 at this time.    [MS]   0737 BP(!): 79/48 [MS]   0737 Heart Rate: 87 [MS]   0739 Reviewed pt's history and workup with Dr. Burroughs.  After a bedside evaluation, they agree with the plan of care.  Care transferred to him at this time.        [MS]      ED Course User Index  [MS] Saumya Geller APRN           MEDICATIONS GIVEN IN ED    Medications   sodium chloride 0.9 % flush 10 mL (has no administration in time range)   ondansetron (ZOFRAN) injection 4 mg (4 mg Intravenous Given 7/9/21 0648)   sodium chloride 0.9 % bolus 1,000 mL (0 mL Intravenous Stopped 7/9/21 0814)           COURSE & MEDICAL DECISION MAKING  Any/All labs and Any/All Imaging studies that were ordered were reviewed and are noted above.  Results were reviewed/discussed with the patient and they were also made aware of online access.    Pt also made aware that some labs, such as cultures, will not be resulted during ER visit and follow up with PMD is necessary.        Saumya Geller APRN  07/09/21 0854

## 2021-07-09 NOTE — ED PROVIDER NOTES
MD ATTESTATION NOTE    The DANIEL and I have discussed this patient's history, physical exam, and treatment plan.  I have reviewed the documentation and personally had a face to face interaction with the patient. I affirm the documentation and agree with the treatment and plan.  The attached note describes my personal findings.      History  89-year-old female presents from home with rapid pulse.  Review of old records show that she does have a history of supraventricular tachycardia.  Patient takes Coreg to maintain rate.    Physical Exam  Vital Signs reviewed  Alert, Well Appearing in NAD  Heart-regular without murmur  Lungs-clear to auscultation bilaterally    Disposition  I discussed treatment and management this patient with NP Isatu Geller.  Patient fortunately self converted from likely PSVT and a sinus rhythm on her own.  She has remained in sinus rhythm for over 1 hour and feels back to normal.  At this point will DC home to follow-up with Dr. Reyes as she has appoint with him next week.    Final diagnoses:   PSVT (paroxysmal supraventricular tachycardia) (CMS/[facility])       DISCHARGE    Patient discharged in stable condition.    Reviewed implications of results, diagnosis, meds, responsibility to follow up, warning signs and symptoms of possible worsening, potential complications and reasons to return to ER, including increased chest pain, shortness of breath, lightheadedness or as needed.    Patient/Family voiced understanding of above instructions.    Discussed plan for discharge, as there is no emergent indication for admission. Patient referred to primary care provider for BP management due to today's BP. Pt/family is agreeable and understands need for follow up and repeat testing.  Pt is aware that discharge does not mean that nothing is wrong but it indicates no emergency is present that requires admission and they must continue care with follow-up as given below or physician of their choice.      FOLLOW-UP  Chai Reyes MD  [address] [id number]  [phone]    In 1 week           Medication List      No changes were made to your prescriptions during this visit.            Anil Burroughs MD  07/09/21 0749

## 2021-07-09 NOTE — ED TRIAGE NOTES
Pt to ED from home via EMS c/o tachycardia, HR in 150s. Pt denies any other sx, states this has happened to her before. Pt awake, A/Ox4, denies pain or SOA. Pt masked, triage staff wearing appropriate PPE including mask, gloves, eye protection.

## 2021-07-14 ENCOUNTER — READMISSION MANAGEMENT (OUTPATIENT)
Dept: CALL CENTER | Facility: HOSPITAL | Age: 86
End: 2021-07-14

## 2021-07-14 NOTE — OUTREACH NOTE
General Surgery Week 3 Survey      Responses   [facility] patient discharged from?  [place]   Does the patient have one of the following disease processes/diagnoses(primary or secondary)?  General Surgery   Week 3 attempt successful?  No   Unsuccessful attempts  Attempt 1   Discharge diagnosis  Cecum mass,  COLON RESECTION RIGHT          Kassidy Mason RN

## 2021-07-17 ENCOUNTER — READMISSION MANAGEMENT (OUTPATIENT)
Dept: CALL CENTER | Facility: HOSPITAL | Age: 86
End: 2021-07-17

## 2021-07-17 NOTE — OUTREACH NOTE
General Surgery Week 3 Survey      Responses   [facility] patient discharged from?  [place]   Does the patient have one of the following disease processes/diagnoses(primary or secondary)?  General Surgery   Week 3 attempt successful?  No   Unsuccessful attempts  Attempt 2          Deanne Lassiter RN

## 2021-08-03 ENCOUNTER — OFFICE VISIT (OUTPATIENT)
Dept: SURGERY | Facility: CLINIC | Age: 86
End: 2021-08-03

## 2021-08-03 VITALS — BODY MASS INDEX: 18.29 KG/M2 | WEIGHT: 103.2 LBS | HEIGHT: 63 IN

## 2021-08-03 DIAGNOSIS — Z48.89 POSTOPERATIVE VISIT: Primary | ICD-10-CM

## 2021-08-03 PROCEDURE — 99024 POSTOP FOLLOW-UP VISIT: CPT | Performed by: SURGERY

## 2021-08-06 ENCOUNTER — TELEPHONE (OUTPATIENT)
Dept: SURGERY | Facility: CLINIC | Age: 86
End: 2021-08-06

## 2022-10-03 NOTE — PROGRESS NOTES
Subjective   Jaqueline Rojas is a 89 y.o. female who returns to the office after undergoing a right hemicolectomy on 6/2/2021.     History of Present Illness     The patient is recovering well with no significant postop symptoms.  She is having no abdominal pain.  Her appetite has returned to its baseline.  She is having regular bowel movements.  Her energy level is basically normal.    Review of Systems   Constitutional: Negative for activity change, appetite change, fatigue and fever.   Respiratory: Negative for chest tightness and shortness of breath.    Cardiovascular: Negative for chest pain and palpitations.   Gastrointestinal: Negative for abdominal pain, constipation, diarrhea and nausea.   Skin: Negative for rash and wound.   Psychiatric/Behavioral: Negative for agitation and confusion.       Objective   Physical Exam  Constitutional:       General: She is not in acute distress.     Appearance: Normal appearance. She is not ill-appearing or toxic-appearing.   Pulmonary:      Effort: Pulmonary effort is normal. No respiratory distress.   Abdominal:      Palpations: Abdomen is soft.      Tenderness: There is no abdominal tenderness.   Skin:     Comments: Incision: intact with no evidence of infection.   Neurological:      Mental Status: She is alert.   Psychiatric:         Behavior: Behavior normal.         Assessment/Plan   The encounter diagnosis was Postoperative visit.    The patient is recovering well from her right hemicolectomy.  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  There is no further treatment planned.  She will follow-up on an as-needed basis.            Tazorac Pregnancy And Lactation Text: This medication is not safe during pregnancy. It is unknown if this medication is excreted in breast milk.

## 2023-03-09 ENCOUNTER — HOSPITAL ENCOUNTER (OUTPATIENT)
Facility: HOSPITAL | Age: 88
LOS: 1 days | Discharge: HOME | End: 2023-03-10
Attending: EMERGENCY MEDICINE | Admitting: INTERNAL MEDICINE
Payer: MEDICARE

## 2023-03-09 DIAGNOSIS — R53.1 GENERALIZED WEAKNESS: Primary | ICD-10-CM

## 2023-03-09 DIAGNOSIS — U07.1 COVID-19 VIRUS INFECTION: ICD-10-CM

## 2023-03-09 PROCEDURE — 83735 ASSAY OF MAGNESIUM: CPT | Performed by: EMERGENCY MEDICINE

## 2023-03-09 PROCEDURE — 93005 ELECTROCARDIOGRAM TRACING: CPT

## 2023-03-09 PROCEDURE — 84484 ASSAY OF TROPONIN QUANT: CPT | Performed by: EMERGENCY MEDICINE

## 2023-03-09 PROCEDURE — 99285 EMERGENCY DEPT VISIT HI MDM: CPT

## 2023-03-09 PROCEDURE — 80053 COMPREHEN METABOLIC PANEL: CPT | Performed by: EMERGENCY MEDICINE

## 2023-03-09 PROCEDURE — 93010 ELECTROCARDIOGRAM REPORT: CPT | Performed by: INTERNAL MEDICINE

## 2023-03-09 PROCEDURE — 93005 ELECTROCARDIOGRAM TRACING: CPT | Performed by: EMERGENCY MEDICINE

## 2023-03-09 PROCEDURE — 85025 COMPLETE CBC W/AUTO DIFF WBC: CPT | Performed by: EMERGENCY MEDICINE

## 2023-03-09 PROCEDURE — 84145 PROCALCITONIN (PCT): CPT | Performed by: EMERGENCY MEDICINE

## 2023-03-09 RX ORDER — SODIUM CHLORIDE 0.9 % (FLUSH) 0.9 %
10 SYRINGE (ML) INJECTION AS NEEDED
Status: DISCONTINUED | OUTPATIENT
Start: 2023-03-09 | End: 2023-03-10 | Stop reason: HOSPADM

## 2023-03-10 ENCOUNTER — APPOINTMENT (OUTPATIENT)
Dept: GENERAL RADIOLOGY | Facility: HOSPITAL | Age: 88
End: 2023-03-10
Payer: MEDICARE

## 2023-03-10 VITALS
HEIGHT: 64 IN | WEIGHT: 105 LBS | OXYGEN SATURATION: 92 % | HEART RATE: 77 BPM | DIASTOLIC BLOOD PRESSURE: 64 MMHG | SYSTOLIC BLOOD PRESSURE: 127 MMHG | RESPIRATION RATE: 16 BRPM | BODY MASS INDEX: 17.93 KG/M2 | TEMPERATURE: 98.2 F

## 2023-03-10 PROBLEM — R53.1 GENERALIZED WEAKNESS: Status: ACTIVE | Noted: 2023-03-10

## 2023-03-10 PROBLEM — Z86.79 HISTORY OF PSVT (PAROXYSMAL SUPRAVENTRICULAR TACHYCARDIA): Status: ACTIVE | Noted: 2023-03-10

## 2023-03-10 PROBLEM — U07.1 COVID-19 VIRUS DETECTED: Status: ACTIVE | Noted: 2023-03-10

## 2023-03-10 PROBLEM — Z85.038 HISTORY OF COLON CANCER: Status: ACTIVE | Noted: 2023-03-10

## 2023-03-10 LAB
ALBUMIN SERPL-MCNC: 3 G/DL (ref 3.5–5.2)
ALBUMIN SERPL-MCNC: 3.8 G/DL (ref 3.5–5.2)
ALBUMIN/GLOB SERPL: 1.2 G/DL
ALP SERPL-CCNC: 55 U/L (ref 39–117)
ALP SERPL-CCNC: 62 U/L (ref 39–117)
ALT SERPL W P-5'-P-CCNC: 15 U/L (ref 1–33)
ALT SERPL W P-5'-P-CCNC: 17 U/L (ref 1–33)
ANION GAP SERPL CALCULATED.3IONS-SCNC: 10 MMOL/L (ref 5–15)
ANION GAP SERPL CALCULATED.3IONS-SCNC: 10.2 MMOL/L (ref 5–15)
AST SERPL-CCNC: 23 U/L (ref 1–32)
AST SERPL-CCNC: 31 U/L (ref 1–32)
B PARAPERT DNA SPEC QL NAA+PROBE: NOT DETECTED
B PERT DNA SPEC QL NAA+PROBE: NOT DETECTED
BACTERIA UR QL AUTO: ABNORMAL /HPF
BASOPHILS # BLD AUTO: 0.02 10*3/MM3 (ref 0–0.2)
BASOPHILS NFR BLD AUTO: 0.2 % (ref 0–1.5)
BILIRUB CONJ SERPL-MCNC: <0.2 MG/DL (ref 0–0.3)
BILIRUB INDIRECT SERPL-MCNC: ABNORMAL MG/DL
BILIRUB SERPL-MCNC: 0.4 MG/DL (ref 0–1.2)
BILIRUB SERPL-MCNC: 0.4 MG/DL (ref 0–1.2)
BILIRUB UR QL STRIP: NEGATIVE
BUN SERPL-MCNC: 15 MG/DL (ref 8–23)
BUN SERPL-MCNC: 15 MG/DL (ref 8–23)
BUN/CREAT SERPL: 18.8 (ref 7–25)
BUN/CREAT SERPL: 22.4 (ref 7–25)
C PNEUM DNA NPH QL NAA+NON-PROBE: NOT DETECTED
CALCIUM SPEC-SCNC: 7.9 MG/DL (ref 8.2–9.6)
CALCIUM SPEC-SCNC: 9 MG/DL (ref 8.2–9.6)
CHLORIDE SERPL-SCNC: 102 MMOL/L (ref 98–107)
CHLORIDE SERPL-SCNC: 98 MMOL/L (ref 98–107)
CLARITY UR: ABNORMAL
CO2 SERPL-SCNC: 22 MMOL/L (ref 22–29)
CO2 SERPL-SCNC: 24.8 MMOL/L (ref 22–29)
COLOR UR: ABNORMAL
CREAT SERPL-MCNC: 0.67 MG/DL (ref 0.57–1)
CREAT SERPL-MCNC: 0.71 MG/DL (ref 0.57–1)
CREAT SERPL-MCNC: 0.8 MG/DL (ref 0.57–1)
D-LACTATE SERPL-SCNC: 1.3 MMOL/L (ref 0.5–2)
DEPRECATED RDW RBC AUTO: 40.2 FL (ref 37–54)
DEPRECATED RDW RBC AUTO: 41.9 FL (ref 37–54)
EGFRCR SERPLBLD CKD-EPI 2021: 66.7 ML/MIN/1.73
EGFRCR SERPLBLD CKD-EPI 2021: 77 ML/MIN/1.73
EGFRCR SERPLBLD CKD-EPI 2021: 79.1 ML/MIN/1.73
EOSINOPHIL # BLD AUTO: 0.01 10*3/MM3 (ref 0–0.4)
EOSINOPHIL NFR BLD AUTO: 0.1 % (ref 0.3–6.2)
ERYTHROCYTE [DISTWIDTH] IN BLOOD BY AUTOMATED COUNT: 12.7 % (ref 12.3–15.4)
ERYTHROCYTE [DISTWIDTH] IN BLOOD BY AUTOMATED COUNT: 12.8 % (ref 12.3–15.4)
FLUAV SUBTYP SPEC NAA+PROBE: NOT DETECTED
FLUBV RNA ISLT QL NAA+PROBE: NOT DETECTED
GLOBULIN UR ELPH-MCNC: 3.3 GM/DL
GLUCOSE SERPL-MCNC: 137 MG/DL (ref 65–99)
GLUCOSE SERPL-MCNC: 153 MG/DL (ref 65–99)
GLUCOSE UR STRIP-MCNC: NEGATIVE MG/DL
HADV DNA SPEC NAA+PROBE: NOT DETECTED
HCOV 229E RNA SPEC QL NAA+PROBE: NOT DETECTED
HCOV HKU1 RNA SPEC QL NAA+PROBE: NOT DETECTED
HCOV NL63 RNA SPEC QL NAA+PROBE: NOT DETECTED
HCOV OC43 RNA SPEC QL NAA+PROBE: NOT DETECTED
HCT VFR BLD AUTO: 29.3 % (ref 34–46.6)
HCT VFR BLD AUTO: 32.4 % (ref 34–46.6)
HGB BLD-MCNC: 10.1 G/DL (ref 12–15.9)
HGB BLD-MCNC: 11.3 G/DL (ref 12–15.9)
HGB UR QL STRIP.AUTO: NEGATIVE
HMPV RNA NPH QL NAA+NON-PROBE: NOT DETECTED
HOLD SPECIMEN: NORMAL
HOLD SPECIMEN: NORMAL
HPIV1 RNA ISLT QL NAA+PROBE: NOT DETECTED
HPIV2 RNA SPEC QL NAA+PROBE: NOT DETECTED
HPIV3 RNA NPH QL NAA+PROBE: NOT DETECTED
HPIV4 P GENE NPH QL NAA+PROBE: NOT DETECTED
HYALINE CASTS UR QL AUTO: ABNORMAL /LPF
IMM GRANULOCYTES # BLD AUTO: 0.03 10*3/MM3 (ref 0–0.05)
IMM GRANULOCYTES NFR BLD AUTO: 0.4 % (ref 0–0.5)
KETONES UR QL STRIP: ABNORMAL
LEUKOCYTE ESTERASE UR QL STRIP.AUTO: ABNORMAL
LYMPHOCYTES # BLD AUTO: 0.83 10*3/MM3 (ref 0.7–3.1)
LYMPHOCYTES NFR BLD AUTO: 10.3 % (ref 19.6–45.3)
M PNEUMO IGG SER IA-ACNC: NOT DETECTED
MAGNESIUM SERPL-MCNC: 2.1 MG/DL (ref 1.7–2.3)
MCH RBC QN AUTO: 30.6 PG (ref 26.6–33)
MCH RBC QN AUTO: 31.2 PG (ref 26.6–33)
MCHC RBC AUTO-ENTMCNC: 34.5 G/DL (ref 31.5–35.7)
MCHC RBC AUTO-ENTMCNC: 34.9 G/DL (ref 31.5–35.7)
MCV RBC AUTO: 87.8 FL (ref 79–97)
MCV RBC AUTO: 90.4 FL (ref 79–97)
MONOCYTES # BLD AUTO: 0.96 10*3/MM3 (ref 0.1–0.9)
MONOCYTES NFR BLD AUTO: 11.9 % (ref 5–12)
NEUTROPHILS NFR BLD AUTO: 6.24 10*3/MM3 (ref 1.7–7)
NEUTROPHILS NFR BLD AUTO: 77.1 % (ref 42.7–76)
NITRITE UR QL STRIP: NEGATIVE
NRBC BLD AUTO-RTO: 0 /100 WBC (ref 0–0.2)
PH UR STRIP.AUTO: 5.5 [PH] (ref 5–8)
PLATELET # BLD AUTO: 130 10*3/MM3 (ref 140–450)
PLATELET # BLD AUTO: 162 10*3/MM3 (ref 140–450)
PMV BLD AUTO: 9.2 FL (ref 6–12)
PMV BLD AUTO: 9.5 FL (ref 6–12)
POTASSIUM SERPL-SCNC: 3.9 MMOL/L (ref 3.5–5.2)
POTASSIUM SERPL-SCNC: 4.1 MMOL/L (ref 3.5–5.2)
PROCALCITONIN SERPL-MCNC: 0.17 NG/ML (ref 0–0.25)
PROT SERPL-MCNC: 6.6 G/DL (ref 6–8.5)
PROT SERPL-MCNC: 7.1 G/DL (ref 6–8.5)
PROT UR QL STRIP: ABNORMAL
QT INTERVAL: 393 MS
RBC # BLD AUTO: 3.24 10*6/MM3 (ref 3.77–5.28)
RBC # BLD AUTO: 3.69 10*6/MM3 (ref 3.77–5.28)
RBC # UR STRIP: ABNORMAL /HPF
REF LAB TEST METHOD: ABNORMAL
RHINOVIRUS RNA SPEC NAA+PROBE: NOT DETECTED
RSV RNA NPH QL NAA+NON-PROBE: NOT DETECTED
SARS-COV-2 RNA NPH QL NAA+NON-PROBE: DETECTED
SODIUM SERPL-SCNC: 133 MMOL/L (ref 136–145)
SODIUM SERPL-SCNC: 134 MMOL/L (ref 136–145)
SP GR UR STRIP: 1.02 (ref 1–1.03)
SQUAMOUS #/AREA URNS HPF: ABNORMAL /HPF
TROPONIN T SERPL HS-MCNC: 14 NG/L
UROBILINOGEN UR QL STRIP: ABNORMAL
WBC # UR STRIP: ABNORMAL /HPF
WBC NRBC COR # BLD: 4.84 10*3/MM3 (ref 3.4–10.8)
WBC NRBC COR # BLD: 8.09 10*3/MM3 (ref 3.4–10.8)
WHOLE BLOOD HOLD COAG: NORMAL
WHOLE BLOOD HOLD SPECIMEN: NORMAL

## 2023-03-10 PROCEDURE — 63710000001 LOPERAMIDE 2 MG CAPSULE: Performed by: NURSE PRACTITIONER

## 2023-03-10 PROCEDURE — 63710000001 ASPIRIN 81 MG TABLET DELAYED-RELEASE: Performed by: NURSE PRACTITIONER

## 2023-03-10 PROCEDURE — A9270 NON-COVERED ITEM OR SERVICE: HCPCS | Performed by: NURSE PRACTITIONER

## 2023-03-10 PROCEDURE — 36415 COLL VENOUS BLD VENIPUNCTURE: CPT | Performed by: NURSE PRACTITIONER

## 2023-03-10 PROCEDURE — G0378 HOSPITAL OBSERVATION PER HR: HCPCS

## 2023-03-10 PROCEDURE — 96372 THER/PROPH/DIAG INJ SC/IM: CPT

## 2023-03-10 PROCEDURE — 85027 COMPLETE CBC AUTOMATED: CPT | Performed by: NURSE PRACTITIONER

## 2023-03-10 PROCEDURE — 82565 ASSAY OF CREATININE: CPT | Performed by: NURSE PRACTITIONER

## 2023-03-10 PROCEDURE — 96365 THER/PROPH/DIAG IV INF INIT: CPT

## 2023-03-10 PROCEDURE — 25010000002 REMDESIVIR 100 MG/20ML SOLUTION 1 EACH VIAL: Performed by: NURSE PRACTITIONER

## 2023-03-10 PROCEDURE — 63710000001 CARVEDILOL 3.125 MG TABLET: Performed by: NURSE PRACTITIONER

## 2023-03-10 PROCEDURE — 87040 BLOOD CULTURE FOR BACTERIA: CPT | Performed by: EMERGENCY MEDICINE

## 2023-03-10 PROCEDURE — 63710000001 BENZONATATE 100 MG CAPSULE: Performed by: NURSE PRACTITIONER

## 2023-03-10 PROCEDURE — 81001 URINALYSIS AUTO W/SCOPE: CPT | Performed by: EMERGENCY MEDICINE

## 2023-03-10 PROCEDURE — 71045 X-RAY EXAM CHEST 1 VIEW: CPT

## 2023-03-10 PROCEDURE — 83605 ASSAY OF LACTIC ACID: CPT | Performed by: EMERGENCY MEDICINE

## 2023-03-10 PROCEDURE — 80048 BASIC METABOLIC PNL TOTAL CA: CPT | Performed by: NURSE PRACTITIONER

## 2023-03-10 PROCEDURE — 25010000002 ENOXAPARIN PER 10 MG: Performed by: NURSE PRACTITIONER

## 2023-03-10 PROCEDURE — 0202U NFCT DS 22 TRGT SARS-COV-2: CPT | Performed by: EMERGENCY MEDICINE

## 2023-03-10 PROCEDURE — 97161 PT EVAL LOW COMPLEX 20 MIN: CPT

## 2023-03-10 PROCEDURE — 97110 THERAPEUTIC EXERCISES: CPT

## 2023-03-10 PROCEDURE — 63710000001 PANTOPRAZOLE 40 MG TABLET DELAYED-RELEASE: Performed by: NURSE PRACTITIONER

## 2023-03-10 PROCEDURE — 80076 HEPATIC FUNCTION PANEL: CPT | Performed by: NURSE PRACTITIONER

## 2023-03-10 RX ORDER — ACETAMINOPHEN 650 MG/1
650 SUPPOSITORY RECTAL EVERY 4 HOURS PRN
Status: DISCONTINUED | OUTPATIENT
Start: 2023-03-10 | End: 2023-03-10 | Stop reason: HOSPADM

## 2023-03-10 RX ORDER — ACETAMINOPHEN 325 MG/1
650 TABLET ORAL EVERY 4 HOURS PRN
Status: DISCONTINUED | OUTPATIENT
Start: 2023-03-10 | End: 2023-03-10 | Stop reason: HOSPADM

## 2023-03-10 RX ORDER — BENZONATATE 100 MG/1
100 CAPSULE ORAL 3 TIMES DAILY PRN
Status: DISCONTINUED | OUTPATIENT
Start: 2023-03-10 | End: 2023-03-10 | Stop reason: HOSPADM

## 2023-03-10 RX ORDER — CARVEDILOL 3.12 MG/1
3.12 TABLET ORAL 2 TIMES DAILY WITH MEALS
Status: DISCONTINUED | OUTPATIENT
Start: 2023-03-10 | End: 2023-03-10 | Stop reason: HOSPADM

## 2023-03-10 RX ORDER — CARVEDILOL 3.12 MG/1
3.12 TABLET ORAL 2 TIMES DAILY WITH MEALS
Start: 2023-03-10

## 2023-03-10 RX ORDER — PANTOPRAZOLE SODIUM 40 MG/1
40 TABLET, DELAYED RELEASE ORAL DAILY
Status: DISCONTINUED | OUTPATIENT
Start: 2023-03-10 | End: 2023-03-10 | Stop reason: HOSPADM

## 2023-03-10 RX ORDER — ONDANSETRON 4 MG/1
4 TABLET, FILM COATED ORAL EVERY 6 HOURS PRN
Status: DISCONTINUED | OUTPATIENT
Start: 2023-03-10 | End: 2023-03-10 | Stop reason: HOSPADM

## 2023-03-10 RX ORDER — LOPERAMIDE HYDROCHLORIDE 2 MG/1
2 CAPSULE ORAL 2 TIMES DAILY PRN
Status: DISCONTINUED | OUTPATIENT
Start: 2023-03-10 | End: 2023-03-10 | Stop reason: HOSPADM

## 2023-03-10 RX ORDER — SODIUM CHLORIDE 9 MG/ML
40 INJECTION, SOLUTION INTRAVENOUS AS NEEDED
Status: DISCONTINUED | OUTPATIENT
Start: 2023-03-10 | End: 2023-03-10 | Stop reason: HOSPADM

## 2023-03-10 RX ORDER — ACETAMINOPHEN 160 MG/5ML
650 SOLUTION ORAL EVERY 4 HOURS PRN
Status: DISCONTINUED | OUTPATIENT
Start: 2023-03-10 | End: 2023-03-10 | Stop reason: HOSPADM

## 2023-03-10 RX ORDER — CALCIUM CARBONATE 200(500)MG
2 TABLET,CHEWABLE ORAL 2 TIMES DAILY PRN
Status: DISCONTINUED | OUTPATIENT
Start: 2023-03-10 | End: 2023-03-10 | Stop reason: HOSPADM

## 2023-03-10 RX ORDER — ENOXAPARIN SODIUM 100 MG/ML
30 INJECTION SUBCUTANEOUS EVERY 24 HOURS
Status: DISCONTINUED | OUTPATIENT
Start: 2023-03-10 | End: 2023-03-10 | Stop reason: HOSPADM

## 2023-03-10 RX ORDER — SODIUM CHLORIDE 0.9 % (FLUSH) 0.9 %
10 SYRINGE (ML) INJECTION EVERY 12 HOURS SCHEDULED
Status: DISCONTINUED | OUTPATIENT
Start: 2023-03-10 | End: 2023-03-10 | Stop reason: HOSPADM

## 2023-03-10 RX ORDER — ASPIRIN 81 MG/1
81 TABLET ORAL DAILY
Status: DISCONTINUED | OUTPATIENT
Start: 2023-03-10 | End: 2023-03-10 | Stop reason: HOSPADM

## 2023-03-10 RX ORDER — BENZONATATE 100 MG/1
100 CAPSULE ORAL 3 TIMES DAILY PRN
Qty: 9 CAPSULE | Refills: 0 | Status: SHIPPED | OUTPATIENT
Start: 2023-03-10 | End: 2023-03-13

## 2023-03-10 RX ORDER — SODIUM CHLORIDE 0.9 % (FLUSH) 0.9 %
10 SYRINGE (ML) INJECTION AS NEEDED
Status: DISCONTINUED | OUTPATIENT
Start: 2023-03-10 | End: 2023-03-10 | Stop reason: HOSPADM

## 2023-03-10 RX ORDER — ONDANSETRON 2 MG/ML
4 INJECTION INTRAMUSCULAR; INTRAVENOUS EVERY 6 HOURS PRN
Status: DISCONTINUED | OUTPATIENT
Start: 2023-03-10 | End: 2023-03-10 | Stop reason: HOSPADM

## 2023-03-10 RX ADMIN — PANTOPRAZOLE SODIUM 40 MG: 40 TABLET, DELAYED RELEASE ORAL at 12:33

## 2023-03-10 RX ADMIN — REMDESIVIR 200 MG: 100 INJECTION, POWDER, LYOPHILIZED, FOR SOLUTION INTRAVENOUS at 12:33

## 2023-03-10 RX ADMIN — BENZONATATE 100 MG: 100 CAPSULE ORAL at 15:14

## 2023-03-10 RX ADMIN — CARVEDILOL 3.12 MG: 3.12 TABLET, FILM COATED ORAL at 12:33

## 2023-03-10 RX ADMIN — ENOXAPARIN SODIUM 30 MG: 30 INJECTION SUBCUTANEOUS at 12:33

## 2023-03-10 RX ADMIN — ASPIRIN 81 MG: 81 TABLET, COATED ORAL at 12:33

## 2023-03-10 RX ADMIN — LOPERAMIDE HYDROCHLORIDE 2 MG: 2 CAPSULE ORAL at 15:14

## 2023-03-10 RX ADMIN — Medication 10 ML: at 08:07

## 2023-03-10 RX ADMIN — SODIUM CHLORIDE 1000 ML: 9 INJECTION, SOLUTION INTRAVENOUS at 00:28

## 2023-03-10 NOTE — PROGRESS NOTES
Case Management Discharge Note      Final Note: Pt discharging home, PT signed off, no needs noted. Susan Linder LCSW         Selected Continued Care - Admitted Since 3/9/2023     Destination    No services have been selected for the patient.              Durable Medical Equipment    No services have been selected for the patient.              Dialysis/Infusion    No services have been selected for the patient.              Home Medical Care    No services have been selected for the patient.              Therapy    No services have been selected for the patient.              Community Resources    No services have been selected for the patient.              Community & DME    No services have been selected for the patient.                  Transportation Services  Private: Car    Final Discharge Disposition Code: 01 - home or self-care

## 2023-03-10 NOTE — PROGRESS NOTES
[facility]  Clinical Pharmacy Department     Remdesivir Review Note    Jaqueline Rojas is a 89 y.o. female with confirmed COVID-19 infection on day 1 of hospitalization.     Consulting Provider:  PAULIE Mccurdy  Date of Confirmed SARS-CoV-2: 3/10  Date of Symptom Onset: ~3/8  Other Antimicrobials: None at this time   Hydroxychloroquine or chloroquine prior to arrival: No    Allergies  Allergies as of 03/09/2023    (No Known Allergies)       Microbiology:  Microbiology Results (last 10 days)       Procedure Component Value - Date/Time    Respiratory Panel PCR w/COVID-19(SARS-CoV-2) IRINEO/KYRA/RED/PAD/COR/MAD/ERICH In-House, NP Swab in UTM/VTM, 3-4 HR TAT - Swab, Nasopharynx [[id number]]  (Abnormal) Collected: 03/10/23 0025    Lab Status: Final result Specimen: Swab from Nasopharynx Updated: 03/10/23 0153     ADENOVIRUS, PCR Not Detected     Coronavirus 229E Not Detected     Coronavirus HKU1 Not Detected     Coronavirus NL63 Not Detected     Coronavirus OC43 Not Detected     COVID19 Detected     Human Metapneumovirus Not Detected     Human Rhinovirus/Enterovirus Not Detected     Influenza A PCR Not Detected     Influenza B PCR Not Detected     Parainfluenza Virus 1 Not Detected     Parainfluenza Virus 2 Not Detected     Parainfluenza Virus 3 Not Detected     Parainfluenza Virus 4 Not Detected     RSV, PCR Not Detected     Bordetella pertussis pcr Not Detected     Bordetella parapertussis PCR Not Detected     Chlamydophila pneumoniae PCR Not Detected     Mycoplasma pneumo by PCR Not Detected    Narrative:      In the setting of a positive respiratory panel with a viral infection PLUS a negative procalcitonin without other underlying concern for bacterial infection, consider observing off antibiotics or discontinuation of antibiotics and continue supportive care. If the respiratory panel is positive for atypical bacterial infection (Bordetella pertussis, Chlamydophila pneumoniae, or Mycoplasma pneumoniae), consider  "antibiotic de-escalation to target atypical bacterial infection.            Vitals/Labs/I&O  Visit Vitals  /69 (BP Location: Right arm, Patient Position: Sitting)   Pulse 77   Temp 98.3 °F (36.8 °C) (Oral)   Resp 16   Ht 162.6 cm (64\")   Wt 47.6 kg (105 lb)   SpO2 97%   BMI 18.02 kg/m²       Results from last 7 days   Lab Units 03/09/23  2345   WBC 10*3/mm3 8.09     Results from last 7 days   Lab Units 03/09/23  2345   PROCALCITONIN ng/mL 0.17     Results from last 7 days   Lab Units 03/09/23  2345   AST (SGOT) U/L 23      Results from last 7 days   Lab Units 03/09/23  2345   ALT (SGPT) U/L 17       Estimated Creatinine Clearance: 29.5 mL/min (by C-G formula based on SCr of 0.8 mg/dL).  Results from last 7 days   Lab Units 03/09/23  2345   BUN mg/dL 15   CREATININE mg/dL 0.80     Intake & Output (last 3 days)         03/07 0701  03/08 0700 03/08 0701  03/09 0700 03/09 0701  03/10 0700 03/10 0701  03/11 0700    IV Piggyback   1000     Total Intake(mL/kg)   1000 (21)     Net   +1000                     Assessment/Plan:    Patient meets the following inclusion/exclusion criteria:  Patient is hospitalized with confirmed COVID-19 infection (and accompanying symptoms)  Patient meets one of the two below criteria:  Patient is requiring an increase in baseline supplemental oxygen requirements OR SpO2 </= 94% on room air secondary to COVID-19 infection OR  Patient is symptomatic but not requiring supplemental oxygen or an increase in baseline oxygen AND has at least one of the below high risk criteria for progression to severe illness. High risk criteria:   Age >/= 65  Cancer  Cardiovascular diseases (HF, CAD, or cardiomyopathies)  CKD  Chronic lung disease (COPD, CF, interstitial lung disease, PE, pulmonary hypertension, bronchopulmonary dysplasia, bronchiectasis)  Diabetes mellitis (insulin dependent or poorly controlled)  Immunocompromising conditions or receipt of immunosuppressive medications  Obesity (BMI > 35 " kg/m2)  Pregnancy and recent pregnancy (within 7 days of delivery)  Sickle cell disease  Baseline and daily LFTs and Scr have been ordered prior to remdesivir initiation  ALT is not ? 10 times the upper limit of normal  Patient is not on concomitant hydroxychloroquine or chloroquine  Patient does not require invasive mechanical ventilation (IMV) or ECMO  Patient is within 10 days from symptom onset (for criteria 2a) or within 7 days of symptom onset (for criteria 2b)    Remdesivir 200 mg IV x 1 dose, followed by 100 mg IV q24h for 2 days or until hospital discharge (whichever comes first) has been ordered.    Thank you for involving pharmacy in this patient's care. Please contact pharmacy with any questions or concerns.                           Cheyenne Gowers, [facility]  Clinical Pharmacist

## 2023-03-10 NOTE — DISCHARGE SUMMARY
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     Procedures Performed  none     Consults     Date and Time Order Name Status Description    3/10/2023  2:09 AM [facility] (on-call MD unless specified) Details          Hospital Course  Please see history and physical for details. Patient is a 89 y.o. female who presented to the hospital with cough and weakness. She was found to be COVID19 positive and admitted for further observation.    The patient was monitored overnight and remained hemodynamically stable without hypoxia. She was given one dose of remdesivir, but desired discharge home as she was at her baseline. PT evaluated and she was cleared for home with her daughter. She did have a cough and will be sent tessalon perles as needed. She should monitor her symptoms at home and return for any worsening dyspnea or oxygenation issues. She should be sure to hydrate and eat as tolerated. I recommend she sees her PCP in 1 week. She denies any chest pain, nausea, vomiting, fever or chills.     I discussed the patient's findings and my recommendations with patient, nursing staff and Dr. Harris.    Condition on Discharge: Improved.     Temp:  [98.2 °F (36.8 °C)-99.5 °F (37.5 °C)] 98.2 °F (36.8 °C)  Heart Rate:  [62-83] 77  Resp:  [16-18] 16  BP: ()/(58-69) 127/64  Body mass index is 18.02 kg/m².     Physical Exam  Vitals and nursing note reviewed.    Constitutional:       Appearance: She is ill-appearing. She is not toxic-appearing.   Cardiovascular:      Rate and Rhythm: Normal rate and regular rhythm.      Pulses: Normal pulses.   Pulmonary:      Effort: Pulmonary effort is normal. No respiratory distress.      Breath sounds: Normal breath sounds.      Comments: Dry cough on exam. 94-95% on RA  Abdominal:      General: Bowel sounds are normal. There is no distension.      Palpations: Abdomen is soft.      Tenderness: There is no abdominal tenderness.   Musculoskeletal:         General: No swelling. Normal range of motion.      Cervical back: Normal range of motion and neck supple.   Skin:     General: Skin is warm and dry.      Findings: No bruising.   Neurological:      General: No focal deficit present.      Mental Status: She is alert and oriented to person, place, and time.      Sensory: No sensory deficit.      Motor: Weakness present.      Coordination: Coordination normal.   Psychiatric:         Mood and Affect: Mood normal.         Behavior: Behavior normal.        Discharge Medications      New Medications      Instructions Start Date   benzonatate 100 MG capsule  Commonly known as: TESSALON   100 mg, Oral, 3 Times Daily PRN         Continue These Medications      Instructions Start Date   acetaminophen 325 MG tablet  Commonly known as: TYLENOL   650 mg, Oral, Every 4 Hours PRN      aspirin 81 MG EC tablet   81 mg, Oral, Daily      carvedilol 3.125 MG tablet  Commonly known as: COREG   3.125 mg, Oral, 2 Times Daily With Meals, 2 tablets at night and 1 in the morning      multivitamin tablet tablet  Generic drug: multivitamin   1 tablet, Oral, Daily      pantoprazole 40 MG EC tablet  Commonly known as: PROTONIX   40 mg, Oral, Daily      polyethylene glycol 17 g packet  Commonly known as: MIRALAX   17 g, Oral, Daily PRN            Diet Instructions     Diet: Regular, Cardiac      Discharge Diet:  Regular  Cardiac            Activity Instructions      Activity as Tolerated           Additional Instructions for the Follow-ups that You Need to Schedule     Discharge Follow-up with PCP   As directed       Currently Documented PCP:    Provider, No Known    PCP Phone Number:    None     Follow Up Details: see in 1 week            Follow-up Information     Provider, No Known .    Why: see in 1 week  Contact information:  [facility] [address]                       Test Results Pending at Discharge  Pending Labs     Order Current Status    Blood Culture - Blood, Arm, Right In process    Blood Culture - Blood, Arm, Right In process         Nancy Curiel, PAULIE  03/10/23  16:03 EST    Discharge time spent greater than 30 minutes.

## 2023-03-10 NOTE — PROGRESS NOTES
"[facility] Clinical Pharmacy Services: Enoxaparin Consult    Jaqueline Rojas has a pharmacy consult to dose enoxaparin for the indication of VTE prophylaxis.    Home Anticoagulation: None      Relevant clinical data and objective history reviewed:  89 y.o. female 162.6 cm (64\") 47.6 kg (105 lb)   Body mass index is 18.02 kg/m².   Results from last 7 days   Lab Units 03/10/23  0915   PLATELETS 10*3/mm3 130*     Estimated Creatinine Clearance: 29.5 mL/min (by C-G formula based on SCr of 0.8 mg/dL).    Assessment/Plan    Will start patient on 30mg subcutaneous every 24 hours, adjusted for renal function. Consult order will be discontinued but pharmacy will continue to follow.     Cheyenne Gowers, [facility]  Clinical Pharmacist    "

## 2023-03-10 NOTE — ED NOTES
Nursing report ED to floor  Jaqueline Rojas  89 y.o.  female    HPI :   Chief Complaint   Patient presents with    Nausea    Weakness - Generalized       Admitting doctor:   Aaron Harris MD    Admitting diagnosis:   The primary encounter diagnosis was Generalized weakness. A diagnosis of COVID-19 virus infection was also pertinent to this visit.    Code status:   Current Code Status       Date Active Code Status Order ID Comments User Context       3/10/2023 0310 CPR (Attempt to Resuscitate) [id number]  Elida Daniels APRN ED        Question Answer    Code Status (Patient has no pulse and is not breathing) CPR (Attempt to Resuscitate)    Medical Interventions (Patient has pulse or is breathing) Full Support                    Allergies:   Patient has no known allergies.    Isolation:   Enhanced Droplet/Contact     Intake and Output    Intake/Output Summary (Last 24 hours) at 3/10/2023 0628  Last data filed at 3/10/2023 0134  Gross per 24 hour   Intake 1000 ml   Output --   Net 1000 ml       Weight:       03/09/23 2059   Weight: 47.6 kg (105 lb)       Most recent vitals:   Vitals:    03/10/23 0400 03/10/23 0415 03/10/23 0515 03/10/23 0615   BP:       Pulse: 62 64 66 69   Resp:       Temp:       TempSrc:       SpO2: 92% 94% 93% 93%   Weight:       Height:           Active LDAs/IV Access:   Lines, Drains & Airways       Active LDAs       Name Placement date Placement time Site Days    Peripheral IV 03/10/23 0022 Anterior;Proximal;Right Forearm 03/10/23  0022  Forearm  less than 1                    Labs (abnormal labs have a star):   Labs Reviewed   RESPIRATORY PANEL PCR W/ COVID-19 (SARS-COV-2) IRINEO/KRYA/RED/PAD/COR/MAD/ERICH IN-HOUSE, NP SWAB IN UTM/VTP, 3-4 HR TAT - Abnormal; Notable for the following components:       Result Value    COVID19 Detected (*)     All other components within normal limits    Narrative:     In the setting of a positive respiratory panel with a viral infection PLUS a negative  procalcitonin without other underlying concern for bacterial infection, consider observing off antibiotics or discontinuation of antibiotics and continue supportive care. If the respiratory panel is positive for atypical bacterial infection (Bordetella pertussis, Chlamydophila pneumoniae, or Mycoplasma pneumoniae), consider antibiotic de-escalation to target atypical bacterial infection.   COMPREHENSIVE METABOLIC PANEL - Abnormal; Notable for the following components:    Glucose 153 (*)     Sodium 133 (*)     All other components within normal limits    Narrative:     GFR Normal >60  Chronic Kidney Disease <60  Kidney Failure <15    The GFR formula is only valid for adults with stable renal function between ages 18 and 70.   SINGLE HSTROPONIN T - Abnormal; Notable for the following components:    HS Troponin T 14 (*)     All other components within normal limits    Narrative:     High Sensitive Troponin T Reference Range:  <10.0 ng/L- Negative Female for AMI  <15.0 ng/L- Negative Male for AMI  >=10 - Abnormal Female indicating possible myocardial injury.  >=15 - Abnormal Male indicating possible myocardial injury.   Clinicians would have to utilize clinical acumen, EKG, Troponin, and serial changes to determine if it is an Acute Myocardial Infarction or myocardial injury due to an underlying chronic condition.        URINALYSIS W/ MICROSCOPIC IF INDICATED (NO CULTURE) - Abnormal; Notable for the following components:    Color, UA Dark Yellow (*)     Appearance, UA Cloudy (*)     Ketones, UA Trace (*)     Protein,  mg/dL (2+) (*)     Leuk Esterase, UA Trace (*)     All other components within normal limits   CBC WITH AUTO DIFFERENTIAL - Abnormal; Notable for the following components:    RBC 3.69 (*)     Hemoglobin 11.3 (*)     Hematocrit 32.4 (*)     Neutrophil % 77.1 (*)     Lymphocyte % 10.3 (*)     Eosinophil % 0.1 (*)     Monocytes, Absolute 0.96 (*)     All other components within normal limits  "  URINALYSIS, MICROSCOPIC ONLY - Abnormal; Notable for the following components:    WBC, UA 3-5 (*)     All other components within normal limits   MAGNESIUM - Normal   LACTIC ACID, PLASMA - Normal   PROCALCITONIN - Normal    Narrative:     As a Marker for Sepsis (Non-Neonates):    1. <0.5 ng/mL represents a low risk of severe sepsis and/or septic shock.  2. >2 ng/mL represents a high risk of severe sepsis and/or septic shock.    As a Marker for Lower Respiratory Tract Infections that require antibiotic therapy:    PCT on Admission    Antibiotic Therapy       6-12 Hrs later    >0.5                Strongly Recommended  >0.25 - <0.5        Recommended   0.1 - 0.25          Discouraged              Remeasure/reassess PCT  <0.1                Strongly Discouraged     Remeasure/reassess PCT    As 28 day mortality risk marker: \"Change in Procalcitonin Result\" (>80% or <=80%) if Day 0 (or Day 1) and Day 4 values are available. Refer to http://www.[facility] Institutepct-calculator.com    Change in PCT <=80%  A decrease of PCT levels below or equal to 80% defines a positive change in PCT test result representing a higher risk for 28-day all-cause mortality of patients diagnosed with severe sepsis for septic shock.    Change in PCT >80%  A decrease of PCT levels of more than 80% defines a negative change in PCT result representing a lower risk for 28-day all-cause mortality of patients diagnosed with severe sepsis or septic shock.      BLOOD CULTURE   BLOOD CULTURE   RAINBOW DRAW    Narrative:     The following orders were created for panel order [place] Draw.  Procedure                               Abnormality         Status                     ---------                               -----------         ------                     Green Top (Gel)[[id number]]                                  Final result               Lavender Top[[id number]]                                     Final result               Gold Top - SST[[id number]]           "                         Final result               Light Blue Top[[id number]]                                   Final result                 Please view results for these tests on the individual orders.   BASIC METABOLIC PANEL   CBC (NO DIFF)   CBC AND DIFFERENTIAL    Narrative:     The following orders were created for panel order CBC & Differential.  Procedure                               Abnormality         Status                     ---------                               -----------         ------                     CBC Auto Differential[[id number]]        Abnormal            Final result                 Please view results for these tests on the individual orders.   GREEN TOP   LAVENDER TOP   GOLD TOP - SST   LIGHT BLUE TOP       EKG:   ECG 12 Lead ED Triage Standing Order; Weak / Dizzy / AMS   Preliminary Result   HEART RATE= 74  bpm   RR Interval= 811  ms   DC Interval= 153  ms   P Horizontal Axis= 47  deg   P Front Axis= 60  deg   QRSD Interval= 95  ms   QT Interval= 393  ms   QRS Axis= -34  deg   T Wave Axis= 12  deg   - BORDERLINE ECG -   Sinus rhythm   Left axis deviation   Low voltage, precordial leads   Abnormal R-wave progression, early transition   Borderline T abnormalities, anterior leads   Electronically Signed By:    Date and Time of Study: 2023-03-09 23:53:33          Meds given in ED:   Medications   sodium chloride 0.9 % flush 10 mL (has no administration in time range)   sodium chloride 0.9 % flush 10 mL (has no administration in time range)   sodium chloride 0.9 % flush 10 mL (has no administration in time range)   sodium chloride 0.9 % infusion 40 mL (has no administration in time range)   acetaminophen (TYLENOL) tablet 650 mg (has no administration in time range)     Or   acetaminophen (TYLENOL) 160 MG/5ML solution 650 mg (has no administration in time range)     Or   acetaminophen (TYLENOL) suppository 650 mg (has no administration in time range)   ondansetron (ZOFRAN) tablet 4 mg (has no  administration in time range)     Or   ondansetron (ZOFRAN) injection 4 mg (has no administration in time range)   calcium carbonate (TUMS) chewable tablet 500 mg (200 mg elemental) (has no administration in time range)   sodium chloride 0.9 % bolus 1,000 mL (0 mL Intravenous Stopped 3/10/23 0134)       Imaging results:  XR Chest 1 View    Result Date: 3/10/2023  No acute findings.  This report was finalized on 3/10/2023 12:44 AM by Dr. Emelia Erickson M.D.       Ambulatory status:   - as tolerated    Social issues:   Social History     Socioeconomic History    Marital status:    Tobacco Use    Smoking status: Never    Smokeless tobacco: Never    Tobacco comments:     caffeine use - 1 cup coffee daily   Vaping Use    Vaping Use: Never used   Substance and Sexual Activity    Alcohol use: No    Drug use: No    Sexual activity: Defer       NIH Stroke Scale:         John Jo RN  03/10/23 06:28 EST

## 2023-03-10 NOTE — ED TRIAGE NOTES
To ER via EMS from home.  C/o cough, nausea, generalized weakness with difficulty ambulating due to weakness.  States others in the house has had colds.  No known covid in house.    Pt in mask at time of triage. Triage staff in appropriate PPE.

## 2023-03-10 NOTE — PLAN OF CARE
Goal Outcome Evaluation:  Plan of Care Reviewed With: patient        Progress: improving  Outcome Evaluation: Pt agreeable to PTthis afternoon. SHe was admitted to [facility] yesterday w complaints of generalized weakness. Pt found to be COVID +. SHe does have a cough, but no SOA at this time. At baseline, pt lives w her daughter. She is typically independent w mobility and ADLs. She does use cane for ambulation. Pt currently doing well w no complaints. She is eager to go home soon. Pt able to perform bed mobiltiy w SBA. She stood w SBA/CGA and then ambulated approx 80 ft in room w HHA. No unsteadiness noted. Pt w no SOA w activity. SHe plans home w daughter. No further acute PT needs at this time. She may ambulate w family or nsg. PT will sign off.

## 2023-03-10 NOTE — H&P
Patient Name:  Jaqueline Rojas  YOB: 1924  MRN:  [id number]  Admit Date:  3/9/2023  Patient Care Team:  Provider, No Known as PCP - General      Subjective   History Present Illness     Chief Complaint   Patient presents with   • Nausea   • Weakness - Generalized     History of Present Illness   Ms. Rojas is a 89 y.o. non-smoker with a history of mesenteric artery stenosis, colon cancer s/p hemicolectomy, PSVT and SUSAN that presents to [facility] complaining of nausea and weakness. The patient states she has been in her usual state of health until the beginning of this week when she started to develop some cold-like symptoms. She states she lives with her daughter who recently had a cold and began having some cough and congestion. She denies any fever, chills or chest pain, but does report maybe a little trouble breathing. She states her appetite was poor and she was having some nausea but no changes in her bowels as she chronically has some diarrhea related to her prior bowel resection. She has become progressively weaker as a result prompting her admission to the hospital.   In the ED, she was 99.3 and 94% on RA. She was otherwise hemodynamically stable and found to be COVID19 positive on RVP. Lab work showed a WBC of 8.09, hemoglobin 11.3 and procal 0.17. Creatinine was 0.80 and sodium 133. UA unremarkable and CXR negative for acute findings. She was admitted for observation.     Review of Systems   Constitutional: Positive for activity change and appetite change. Negative for fever.   HENT: Positive for congestion. Negative for ear pain.    Respiratory: Positive for cough and shortness of breath.    Cardiovascular: Negative for chest pain and leg swelling.   Gastrointestinal: Positive for diarrhea and nausea. Negative for abdominal pain, constipation and vomiting.   Genitourinary: Negative for difficulty urinating and dysuria.   Musculoskeletal: Positive for gait problem.  Negative for arthralgias and back pain.   Skin: Negative for color change and pallor.   Neurological: Positive for weakness. Negative for dizziness and light-headedness.   Psychiatric/Behavioral: Negative for confusion. The patient is not nervous/anxious.       Personal History     Past Medical History:   Diagnosis Date   • Afib (HCC)    • Arthritis    • Cancer (HCC)    • CANCINO (dyspnea on exertion)    • Frequent PVCs     multifocal   • Hypertension    • Hypotension    • Irregular heart beat    • Knee injury    • Mitral regurgitation     Moderate to Severe per Echocardiogram 8/2016   • Palpitations    • PONV (postoperative nausea and vomiting)    • Supraventricular tachycardia (HCC)    • Wrist fracture      Past Surgical History:   Procedure Laterality Date   • COLON RESECTION N/A 6/2/2021    Procedure: COLON RESECTION RIGHT;  Surgeon: Baldemar Griffin Jr., MD;  Location: [facility];  Service: General;  Laterality: N/A;   • MASTOID SURGERY     • SKIN GRAFT     • TONSILLECTOMY     • WRIST SURGERY       Family History   Problem Relation Age of Onset   • Heart disease Mother    • Diabetes Mother    • Stroke Father    • Diabetes Brother      Social History     Tobacco Use   • Smoking status: Never   • Smokeless tobacco: Never   • Tobacco comments:     caffeine use - 1 cup coffee daily   Vaping Use   • Vaping Use: Never used   Substance Use Topics   • Alcohol use: No   • Drug use: No     Medications Prior to Admission   Medication Sig Dispense Refill Last Dose   • aspirin 81 MG EC tablet Take 1 tablet by mouth Daily.   3/9/2023   • carvedilol (COREG) 3.125 MG tablet Take 1 tablet by mouth 2 (Two) Times a Day With Meals. (Patient taking differently: Take 1 tablet by mouth 2 (Two) Times a Day With Meals. 2 tablets at night and 1 in the morning)   3/9/2023   • acetaminophen (TYLENOL) 325 MG tablet Take 2 tablets by mouth Every 4 (Four) Hours As Needed for Mild Pain .      • Multiple Vitamin (MULTIVITAMIN) tablet Take 1  tablet by mouth daily.      • pantoprazole (PROTONIX) 40 MG EC tablet Take 1 tablet by mouth Daily. 30 tablet 0    • polyethylene glycol (polyethylene glycol) 17 g packet Take 17 g by mouth Daily As Needed (constipation). 30 packet 0      Allergies:  No Known Allergies    Objective    Objective     Vital Signs  Temp:  [98.3 °F (36.8 °C)-99.5 °F (37.5 °C)] 98.3 °F (36.8 °C)  Heart Rate:  [62-83] 77  Resp:  [16-18] 16  BP: ()/(58-69) 142/69  SpO2:  [90 %-97 %] 97 %  on   ;   Device (Oxygen Therapy): room air  Body mass index is 18.02 kg/m².    Physical Exam  Vitals and nursing note reviewed.   Constitutional:       Appearance: She is ill-appearing. She is not toxic-appearing.   HENT:      Head: Normocephalic and atraumatic.      Right Ear: External ear normal.      Left Ear: External ear normal.      Nose: Nose normal.   Eyes:      General:         Right eye: No discharge.         Left eye: No discharge.      Conjunctiva/sclera: Conjunctivae normal.   Cardiovascular:      Rate and Rhythm: Normal rate and regular rhythm.      Pulses: Normal pulses.   Pulmonary:      Effort: Pulmonary effort is normal. No respiratory distress.      Breath sounds: Normal breath sounds.      Comments: Dry cough on exam. 94-95% on RA  Abdominal:      General: Bowel sounds are normal. There is no distension.      Palpations: Abdomen is soft.      Tenderness: There is no abdominal tenderness.   Musculoskeletal:         General: No swelling. Normal range of motion.      Cervical back: Normal range of motion and neck supple.   Skin:     General: Skin is warm and dry.      Findings: No bruising.   Neurological:      General: No focal deficit present.      Mental Status: She is alert and oriented to person, place, and time.      Sensory: No sensory deficit.      Motor: Weakness present.      Coordination: Coordination normal.   Psychiatric:         Mood and Affect: Mood normal.         Behavior: Behavior normal.      Results Review:  I  reviewed the patient's new clinical results.  I reviewed the patient's new imaging results and agree with the interpretation.  I reviewed the patient's other test results and agree with the interpretation  I personally viewed and interpreted the patient's EKG/Telemetry data    Lab Results (last 24 hours)     Procedure Component Value Units Date/Time    CBC & Differential [[id number]]  (Abnormal) Collected: 03/09/23 2345    Specimen: Blood Updated: 03/10/23 0005    Narrative:      The following orders were created for panel order CBC & Differential.  Procedure                               Abnormality         Status                     ---------                               -----------         ------                     CBC Auto Differential[[id number]]        Abnormal            Final result                 Please view results for these tests on the individual orders.    Comprehensive Metabolic Panel [[id number]]  (Abnormal) Collected: 03/09/23 2345    Specimen: Blood Updated: 03/10/23 0032     Glucose 153 mg/dL      BUN 15 mg/dL      Creatinine 0.80 mg/dL      Sodium 133 mmol/L      Potassium 4.1 mmol/L      Chloride 98 mmol/L      CO2 24.8 mmol/L      Calcium 9.0 mg/dL      Total Protein 7.1 g/dL      Albumin 3.8 g/dL      ALT (SGPT) 17 U/L      AST (SGOT) 23 U/L      Alkaline Phosphatase 62 U/L      Total Bilirubin 0.4 mg/dL      Globulin 3.3 gm/dL      A/G Ratio 1.2 g/dL      BUN/Creatinine Ratio 18.8     Anion Gap 10.2 mmol/L      eGFR 66.7 mL/min/1.73     Narrative:      GFR Normal >60  Chronic Kidney Disease <60  Kidney Failure <15    The GFR formula is only valid for adults with stable renal function between ages 18 and 70.    Single High Sensitivity Troponin T [[id number]]  (Abnormal) Collected: 03/09/23 2345    Specimen: Blood Updated: 03/10/23 0035     HS Troponin T 14 ng/L     Narrative:      High Sensitive Troponin T Reference Range:  <10.0 ng/L- Negative Female for AMI  <15.0 ng/L- Negative Male for  AMI  >=10 - Abnormal Female indicating possible myocardial injury.  >=15 - Abnormal Male indicating possible myocardial injury.   Clinicians would have to utilize clinical acumen, EKG, Troponin, and serial changes to determine if it is an Acute Myocardial Infarction or myocardial injury due to an underlying chronic condition.         Magnesium [[id number]]  (Normal) Collected: 03/09/23 2345    Specimen: Blood Updated: 03/10/23 0032     Magnesium 2.1 mg/dL     CBC Auto Differential [[id number]]  (Abnormal) Collected: 03/09/23 2345    Specimen: Blood Updated: 03/10/23 0005     WBC 8.09 10*3/mm3      RBC 3.69 10*6/mm3      Hemoglobin 11.3 g/dL      Hematocrit 32.4 %      MCV 87.8 fL      MCH 30.6 pg      MCHC 34.9 g/dL      RDW 12.8 %      RDW-SD 40.2 fl      MPV 9.2 fL      Platelets 162 10*3/mm3      Neutrophil % 77.1 %      Lymphocyte % 10.3 %      Monocyte % 11.9 %      Eosinophil % 0.1 %      Basophil % 0.2 %      Immature Grans % 0.4 %      Neutrophils, Absolute 6.24 10*3/mm3      Lymphocytes, Absolute 0.83 10*3/mm3      Monocytes, Absolute 0.96 10*3/mm3      Eosinophils, Absolute 0.01 10*3/mm3      Basophils, Absolute 0.02 10*3/mm3      Immature Grans, Absolute 0.03 10*3/mm3      nRBC 0.0 /100 WBC     Procalcitonin [[id number]]  (Normal) Collected: 03/09/23 2345    Specimen: Blood Updated: 03/10/23 0035     Procalcitonin 0.17 ng/mL     Narrative:      As a Marker for Sepsis (Non-Neonates):    1. <0.5 ng/mL represents a low risk of severe sepsis and/or septic shock.  2. >2 ng/mL represents a high risk of severe sepsis and/or septic shock.    As a Marker for Lower Respiratory Tract Infections that require antibiotic therapy:    PCT on Admission    Antibiotic Therapy       6-12 Hrs later    >0.5                Strongly Recommended  >0.25 - <0.5        Recommended   0.1 - 0.25          Discouraged              Remeasure/reassess PCT  <0.1                Strongly Discouraged     Remeasure/reassess PCT    As 28 day  "mortality risk marker: \"Change in Procalcitonin Result\" (>80% or <=80%) if Day 0 (or Day 1) and Day 4 values are available. Refer to http://[email or web address]-pct-calculator.com    Change in PCT <=80%  A decrease of PCT levels below or equal to 80% defines a positive change in PCT test result representing a higher risk for 28-day all-cause mortality of patients diagnosed with severe sepsis for septic shock.    Change in PCT >80%  A decrease of PCT levels of more than 80% defines a negative change in PCT result representing a lower risk for 28-day all-cause mortality of patients diagnosed with severe sepsis or septic shock.       Urinalysis With Microscopic If Indicated (No Culture) - Urine, Clean Catch [[id number]]  (Abnormal) Collected: 03/10/23 0008    Specimen: Urine, Clean Catch Updated: 03/10/23 0121     Color, UA Dark Yellow     Appearance, UA Cloudy     pH, UA 5.5     Specific Gravity, UA 1.024     Glucose, UA Negative     Ketones, UA Trace     Bilirubin, UA Negative     Blood, UA Negative     Protein,  mg/dL (2+)     Leuk Esterase, UA Trace     Nitrite, UA Negative     Urobilinogen, UA 1.0 E.U./dL    Urinalysis, Microscopic Only - Urine, Clean Catch [[id number]]  (Abnormal) Collected: 03/10/23 0008    Specimen: Urine, Clean Catch Updated: 03/10/23 0121     RBC, UA None Seen /HPF      WBC, UA 3-5 /HPF      Bacteria, UA None Seen /HPF      Squamous Epithelial Cells, UA 0-2 /HPF      Hyaline Casts, UA 13-20 /LPF      Methodology Manual Light Microscopy    Blood Culture - Blood, Arm, Right [[id number]] Collected: 03/10/23 0021    Specimen: Blood from Arm, Right Updated: 03/10/23 0025    Lactic Acid, Plasma [[id number]]  (Normal) Collected: 03/10/23 0021    Specimen: Blood Updated: 03/10/23 0049     Lactate 1.3 mmol/L     Blood Culture - Blood, Arm, Right [[id number]] Collected: 03/10/23 0024    Specimen: Blood from Arm, Right Updated: 03/10/23 0034    Respiratory Panel PCR w/COVID-19(SARS-CoV-2) " IRINEO/KYRA/RED/PAD/COR/MAD/ERICH In-House, NP Swab in UTM/VTM, 3-4 HR TAT - Swab, Nasopharynx [[id number]]  (Abnormal) Collected: 03/10/23 0025    Specimen: Swab from Nasopharynx Updated: 03/10/23 0153     ADENOVIRUS, PCR Not Detected     Coronavirus 229E Not Detected     Coronavirus HKU1 Not Detected     Coronavirus NL63 Not Detected     Coronavirus OC43 Not Detected     COVID19 Detected     Human Metapneumovirus Not Detected     Human Rhinovirus/Enterovirus Not Detected     Influenza A PCR Not Detected     Influenza B PCR Not Detected     Parainfluenza Virus 1 Not Detected     Parainfluenza Virus 2 Not Detected     Parainfluenza Virus 3 Not Detected     Parainfluenza Virus 4 Not Detected     RSV, PCR Not Detected     Bordetella pertussis pcr Not Detected     Bordetella parapertussis PCR Not Detected     Chlamydophila pneumoniae PCR Not Detected     Mycoplasma pneumo by PCR Not Detected    Narrative:      In the setting of a positive respiratory panel with a viral infection PLUS a negative procalcitonin without other underlying concern for bacterial infection, consider observing off antibiotics or discontinuation of antibiotics and continue supportive care. If the respiratory panel is positive for atypical bacterial infection (Bordetella pertussis, Chlamydophila pneumoniae, or Mycoplasma pneumoniae), consider antibiotic de-escalation to target atypical bacterial infection.    Basic Metabolic Panel [[id number]]  (Abnormal) Collected: 03/10/23 0915    Specimen: Blood Updated: 03/10/23 1116     Glucose 137 mg/dL      BUN 15 mg/dL      Creatinine 0.67 mg/dL      Sodium 134 mmol/L      Potassium 3.9 mmol/L      Chloride 102 mmol/L      CO2 22.0 mmol/L      Calcium 7.9 mg/dL      BUN/Creatinine Ratio 22.4     Anion Gap 10.0 mmol/L      eGFR 79.1 mL/min/1.73     Narrative:      GFR Normal >60  Chronic Kidney Disease <60  Kidney Failure <15    The GFR formula is only valid for adults with stable renal function between ages 18 and  70.    CBC (No Diff) [[id number]]  (Abnormal) Collected: 03/10/23 0915    Specimen: Blood Updated: 03/10/23 1040     WBC 4.84 10*3/mm3      RBC 3.24 10*6/mm3      Hemoglobin 10.1 g/dL      Hematocrit 29.3 %      MCV 90.4 fL      MCH 31.2 pg      MCHC 34.5 g/dL      RDW 12.7 %      RDW-SD 41.9 fl      MPV 9.5 fL      Platelets 130 10*3/mm3     Hepatic Function Panel [[id number]] Collected: 03/10/23 1110    Specimen: Blood Updated: 03/10/23 1120    Creatinine Serum (kidney function) GFR component [[id number]] Collected: 03/10/23 1110    Specimen: Blood Updated: 03/10/23 1120          Imaging Results (Last 24 Hours)     Procedure Component Value Units Date/Time    XR Chest 1 View [[id number]] Collected: 03/10/23 0043     Updated: 03/10/23 0047    Narrative:      SINGLE VIEW OF THE CHEST     HISTORY: Weakness and dizziness     COMPARISON: 07/09/2021     FINDINGS:  Cardiac silhouette is stable. There is a chronic scarring are noted at  both lung bases. No pneumothorax is identified. Blunting of the  costophrenic angles is unchanged. No definite acute infiltrates are  seen.       Impression:      No acute findings.     This report was finalized on 3/10/2023 12:44 AM by Dr. Emelia Erickson M.D.             Results for orders placed during the hospital encounter of 08/17/16    Adult transthoracic echo complete    Interpretation Summary  · Calculated EF = 55%  · All left ventricular wall segments contract normally.  · Left ventricular function is normal.  · Left ventricular diastolic dysfunction (grade I a) consistent with impaired relaxation.  · Moderate-to-severe mitral valve regurgitation is present  · Mild tricuspid valve regurgitation is present.  · Estimated right ventricular systolic pressure from tricuspid regurgitation is normal (<35 mmHg).      ECG 12 Lead ED Triage Standing Order; Weak / Dizzy / AMS   Preliminary Result   HEART RATE= 74  bpm   RR Interval= 811  ms   ID Interval= 153  ms   P Horizontal Axis= 47  deg    P Front Axis= 60  deg   QRSD Interval= 95  ms   QT Interval= 393  ms   QRS Axis= -34  deg   T Wave Axis= 12  deg   - BORDERLINE ECG -   Sinus rhythm   Left axis deviation   Low voltage, precordial leads   Abnormal R-wave progression, early transition   Borderline T abnormalities, anterior leads   Electronically Signed By:    Date and Time of Study: 2023-03-09 23:53:33           Assessment/Plan     Active Hospital Problems    Diagnosis  POA   • **COVID-19 virus detected [U07.1]  Yes   • Generalized weakness [R53.1]  Yes   • History of colon cancer [Z85.038]  Yes   • History of PSVT (paroxysmal supraventricular tachycardia) [Z86.79]  Not Applicable   • Superior mesenteric artery stenosis (HCC) [K55.1]  Yes   • Iron deficiency anemia due to chronic blood loss [D50.0]  Yes     Added automatically from request for surgery [id number]       Ms. Rojas is a 89 year old female who presented to the hospital with cough and weakness. She was found to be COVID19 positive and admitted for further observation.     · COVID19: Suspect primary source of her weakness and symptoms in the setting of advanced age. She is not hypoxic and CXR without consolidation. Her age and fragility seems to be her biggest risk factor for any worsening. Hold on steroids and start empiric remdesivir therapy. Add Lovenox for VTE prophylaxis and monitor her symptoms. Will add tessalon perles for cough.     · Generalized weakness: Secondary to the above. Will ask PT to see for safest disposition.     · History of colon cancer/mesenteric artery stenosis: GI findings stable. S/P right hemicolectomy a few years ago with Dr. Griffin.    · History of PSVT: Will resume her home Coreg and ASA. Has seen Dr. Reyes in the past.     · SUSAN: Hemoglobin stable. Monitor trends.    · I discussed the patients findings and my recommendations with patient.    VTE Prophylaxis - Lovenox 30 mg SC daily.  Code Status - No CPR/intubation.    Patient desires for discharge as soon  as possible. I favor watching overnight, but will see how she does this afternoon and with PT.      PAULIE Fox  [facility]  03/10/23  11:33 EST

## 2023-03-10 NOTE — ED PROVIDER NOTES
" EMERGENCY DEPARTMENT ENCOUNTER    Room Number:  12/12  Date seen:  3/10/2023  PCP: Provider, No Known  Historian: Patient/family       HPI:  Chief Complaint: Generalized weakness  A complete HPI/ROS/PMH/PSH/SH/FH are unobtainable due to: None  Context: Jaqueline Rojas is a 89 y.o. female who presents to the ED c/o gradual in onset and quite intense generalized weakness that began earlier today and progressed throughout the evening.  She reports now that the weakness has somewhat improved since ED arrival.  The weakness was generalized and nonlocalized.  She does report that she was having difficulty getting up out of bed or ambulating secondary to that weakness.  She also reports a nonproductive cough as well as a \"cold\" that she has been dealing with for the last couple of days.  She denies fever/chills, chest pain, shortness of breath, headache, or nausea and vomiting.          PAST MEDICAL HISTORY  Active Ambulatory Problems     Diagnosis Date Noted   • Irregular heart rate 02/04/2016   • Shortness of breath on exertion 02/05/2016   • Supraventricular tachycardia (HCC) 08/17/2016   • PVC (premature ventricular contraction) 08/17/2016   • PSVT (paroxysmal supraventricular tachycardia) ([facility]) 01/05/2018   • Palpitations 01/23/2018   • Ischemic colitis (HCC) 05/24/2021   • Mitral valve regurgitation 05/25/2021   • Anemia 05/25/2021   • Superior mesenteric artery stenosis (HCC) 05/26/2021   • Iron deficiency anemia due to chronic blood loss 05/24/2021   • Cecum mass 05/31/2021   • Hyponatremia 06/04/2021   • Cecal cancer (HCC) 06/10/2021     Resolved Ambulatory Problems     Diagnosis Date Noted   • Leukocytosis 05/25/2021   • Hyperglycemia 05/25/2021   • Hypokalemia 05/29/2021   • Postoperative ileus (HCC) 06/10/2021     Past Medical History:   Diagnosis Date   • Afib ([facility])    • CANCINO (dyspnea on exertion)    • Frequent PVCs    • Hypotension    • Irregular heart beat    • Knee injury    • Mitral regurgitation    • PONV " (postoperative nausea and vomiting)    • Wrist fracture          PAST SURGICAL HISTORY  Past Surgical History:   Procedure Laterality Date   • COLON RESECTION N/A 6/2/2021    Procedure: COLON RESECTION RIGHT;  Surgeon: Baldemar Griffin Jr., MD;  Location: [facility] OR;  Service: General;  Laterality: N/A;   • MASTOID SURGERY     • SKIN GRAFT     • TONSILLECTOMY     • WRIST SURGERY           FAMILY HISTORY  Family History   Problem Relation Age of Onset   • Heart disease Mother    • Diabetes Mother    • Stroke Father    • Diabetes Brother          SOCIAL HISTORY  Social History     Socioeconomic History   • Marital status:    Tobacco Use   • Smoking status: Never   • Smokeless tobacco: Never   • Tobacco comments:     caffeine use - 1 cup coffee daily   Vaping Use   • Vaping Use: Never used   Substance and Sexual Activity   • Alcohol use: No   • Drug use: No   • Sexual activity: Defer         ALLERGIES  Patient has no known allergies.        REVIEW OF SYSTEMS  Review of Systems   Constitutional: Negative for fever.   HENT: Negative for sore throat.    Eyes: Negative.    Respiratory: Positive for cough. Negative for shortness of breath.    Cardiovascular: Negative for chest pain.   Gastrointestinal: Negative for abdominal pain, diarrhea and vomiting.   Genitourinary: Negative for dysuria.   Musculoskeletal: Negative for neck pain.   Skin: Negative for rash.   Allergic/Immunologic: Negative.    Neurological: Positive for weakness. Negative for numbness and headaches.   Hematological: Negative.    Psychiatric/Behavioral: Negative.    All other systems reviewed and are negative.         PHYSICAL EXAM  ED Triage Vitals   Temp Heart Rate Resp BP SpO2   03/09/23 2059 03/09/23 2059 03/09/23 2059 03/09/23 2059 03/09/23 2059   99.3 °F (37.4 °C) 83 16 129/62 94 %      Temp src Heart Rate Source Patient Position BP Location FiO2 (%)   03/09/23 2347 03/09/23 2347 03/09/23 2347 03/09/23 2347 --   Tympanic Monitor Sitting  Right arm        Physical Exam  Vitals and nursing note reviewed.   Constitutional:       General: She is not in acute distress.  HENT:      Head: Normocephalic and atraumatic.   Eyes:      Pupils: Pupils are equal, round, and reactive to light.   Cardiovascular:      Rate and Rhythm: Normal rate and regular rhythm.      Heart sounds: Normal heart sounds.   Pulmonary:      Effort: Pulmonary effort is normal. No respiratory distress.      Breath sounds: Normal breath sounds.   Abdominal:      Palpations: Abdomen is soft.      Tenderness: There is no abdominal tenderness. There is no guarding or rebound.   Musculoskeletal:         General: Normal range of motion.      Cervical back: Normal range of motion and neck supple.   Skin:     General: Skin is warm and dry.      Findings: No rash.   Neurological:      Mental Status: She is alert and oriented to person, place, and time.      Sensory: Sensation is intact.   Psychiatric:         Mood and Affect: Mood and affect normal.         Vital signs and nursing notes reviewed.          LAB RESULTS  Recent Results (from the past 24 hour(s))   Comprehensive Metabolic Panel    Collection Time: 03/09/23 11:45 PM    Specimen: Blood   Result Value Ref Range    Glucose 153 (H) 65 - 99 mg/dL    BUN 15 8 - 23 mg/dL    Creatinine 0.80 0.57 - 1.00 mg/dL    Sodium 133 (L) 136 - 145 mmol/L    Potassium 4.1 3.5 - 5.2 mmol/L    Chloride 98 98 - 107 mmol/L    CO2 24.8 22.0 - 29.0 mmol/L    Calcium 9.0 8.2 - 9.6 mg/dL    Total Protein 7.1 6.0 - 8.5 g/dL    Albumin 3.8 3.5 - 5.2 g/dL    ALT (SGPT) 17 1 - 33 U/L    AST (SGOT) 23 1 - 32 U/L    Alkaline Phosphatase 62 39 - 117 U/L    Total Bilirubin 0.4 0.0 - 1.2 mg/dL    Globulin 3.3 gm/dL    A/G Ratio 1.2 g/dL    BUN/Creatinine Ratio 18.8 7.0 - 25.0    Anion Gap 10.2 5.0 - 15.0 mmol/L    eGFR 66.7 >60.0 mL/min/1.73   Single High Sensitivity Troponin T    Collection Time: 03/09/23 11:45 PM    Specimen: Blood   Result Value Ref Range    HS  Troponin T 14 (H) <10 ng/L   Magnesium    Collection Time: 03/09/23 11:45 PM    Specimen: Blood   Result Value Ref Range    Magnesium 2.1 1.7 - 2.3 mg/dL   Green Top (Gel)    Collection Time: 03/09/23 11:45 PM   Result Value Ref Range    Extra Tube Hold for add-ons.    Lavender Top    Collection Time: 03/09/23 11:45 PM   Result Value Ref Range    Extra Tube hold for add-on    Gold Top - SST    Collection Time: 03/09/23 11:45 PM   Result Value Ref Range    Extra Tube Hold for add-ons.    Light Blue Top    Collection Time: 03/09/23 11:45 PM   Result Value Ref Range    Extra Tube Hold for add-ons.    CBC Auto Differential    Collection Time: 03/09/23 11:45 PM    Specimen: Blood   Result Value Ref Range    WBC 8.09 3.40 - 10.80 10*3/mm3    RBC 3.69 (L) 3.77 - 5.28 10*6/mm3    Hemoglobin 11.3 (L) 12.0 - 15.9 g/dL    Hematocrit 32.4 (L) 34.0 - 46.6 %    MCV 87.8 79.0 - 97.0 fL    MCH 30.6 26.6 - 33.0 pg    MCHC 34.9 31.5 - 35.7 g/dL    RDW 12.8 12.3 - 15.4 %    RDW-SD 40.2 37.0 - 54.0 fl    MPV 9.2 6.0 - 12.0 fL    Platelets 162 140 - 450 10*3/mm3    Neutrophil % 77.1 (H) 42.7 - 76.0 %    Lymphocyte % 10.3 (L) 19.6 - 45.3 %    Monocyte % 11.9 5.0 - 12.0 %    Eosinophil % 0.1 (L) 0.3 - 6.2 %    Basophil % 0.2 0.0 - 1.5 %    Immature Grans % 0.4 0.0 - 0.5 %    Neutrophils, Absolute 6.24 1.70 - 7.00 10*3/mm3    Lymphocytes, Absolute 0.83 0.70 - 3.10 10*3/mm3    Monocytes, Absolute 0.96 (H) 0.10 - 0.90 10*3/mm3    Eosinophils, Absolute 0.01 0.00 - 0.40 10*3/mm3    Basophils, Absolute 0.02 0.00 - 0.20 10*3/mm3    Immature Grans, Absolute 0.03 0.00 - 0.05 10*3/mm3    nRBC 0.0 0.0 - 0.2 /100 WBC   Procalcitonin    Collection Time: 03/09/23 11:45 PM    Specimen: Blood   Result Value Ref Range    Procalcitonin 0.17 0.00 - 0.25 ng/mL   ECG 12 Lead ED Triage Standing Order; Weak / Dizzy / AMS    Collection Time: 03/09/23 11:53 PM   Result Value Ref Range    QT Interval 393 ms   Urinalysis With Microscopic If Indicated (No Culture) -  Urine, Clean Catch    Collection Time: 03/10/23 12:08 AM    Specimen: Urine, Clean Catch   Result Value Ref Range    Color, UA Dark Yellow (A) Yellow, Straw    Appearance, UA Cloudy (A) Clear    pH, UA 5.5 5.0 - 8.0    Specific Gravity, UA 1.024 1.005 - 1.030    Glucose, UA Negative Negative    Ketones, UA Trace (A) Negative    Bilirubin, UA Negative Negative    Blood, UA Negative Negative    Protein,  mg/dL (2+) (A) Negative    Leuk Esterase, UA Trace (A) Negative    Nitrite, UA Negative Negative    Urobilinogen, UA 1.0 E.U./dL 0.2 - 1.0 E.U./dL   Urinalysis, Microscopic Only - Urine, Clean Catch    Collection Time: 03/10/23 12:08 AM    Specimen: Urine, Clean Catch   Result Value Ref Range    RBC, UA None Seen None Seen, 0-2 /HPF    WBC, UA 3-5 (A) None Seen, 0-2 /HPF    Bacteria, UA None Seen None Seen /HPF    Squamous Epithelial Cells, UA 0-2 None Seen, 0-2 /HPF    Hyaline Casts, UA 13-20 None Seen /LPF    Methodology Manual Light Microscopy    Lactic Acid, Plasma    Collection Time: 03/10/23 12:21 AM    Specimen: Blood   Result Value Ref Range    Lactate 1.3 0.5 - 2.0 mmol/L   Respiratory Panel PCR w/COVID-19(SARS-CoV-2) IRINEO/KYRA/RED/PAD/COR/MAD/ERICH In-House, NP Swab in UTM/VTM, 3-4 HR TAT - Swab, Nasopharynx    Collection Time: 03/10/23 12:25 AM    Specimen: Nasopharynx; Swab   Result Value Ref Range    ADENOVIRUS, PCR Not Detected Not Detected    Coronavirus 229E Not Detected Not Detected    Coronavirus HKU1 Not Detected Not Detected    Coronavirus NL63 Not Detected Not Detected    Coronavirus OC43 Not Detected Not Detected    COVID19 Detected (C) Not Detected - Ref. Range    Human Metapneumovirus Not Detected Not Detected    Human Rhinovirus/Enterovirus Not Detected Not Detected    Influenza A PCR Not Detected Not Detected    Influenza B PCR Not Detected Not Detected    Parainfluenza Virus 1 Not Detected Not Detected    Parainfluenza Virus 2 Not Detected Not Detected    Parainfluenza Virus 3 Not Detected  Not Detected    Parainfluenza Virus 4 Not Detected Not Detected    RSV, PCR Not Detected Not Detected    Bordetella pertussis pcr Not Detected Not Detected    Bordetella parapertussis PCR Not Detected Not Detected    Chlamydophila pneumoniae PCR Not Detected Not Detected    Mycoplasma pneumo by PCR Not Detected Not Detected       Ordered the above labs and reviewed the results.        RADIOLOGY  XR Chest 1 View    Result Date: 3/10/2023  SINGLE VIEW OF THE CHEST  HISTORY: Weakness and dizziness  COMPARISON: 07/09/2021  FINDINGS: Cardiac silhouette is stable. There is a chronic scarring are noted at both lung bases. No pneumothorax is identified. Blunting of the costophrenic angles is unchanged. No definite acute infiltrates are seen.      No acute findings.  This report was finalized on 3/10/2023 12:44 AM by Dr. Emelia Erickson M.D.        Ordered the above noted radiological studies. Reviewed by me in PACS.            PROCEDURES  Procedures    EKG independently interpreted by myself as follows:    EKG          EKG time: 2353  Rhythm/Rate: NSR, 74  P waves and DC: nml  QRS, axis: nml, nml   ST and T waves: Normal ST segments, lateral T wave flattening    Interpreted Contemporaneously by me, independently viewed  changed compared to prior 7/9/21            MEDICATIONS GIVEN IN ER  Medications   sodium chloride 0.9 % flush 10 mL (has no administration in time range)   sodium chloride 0.9 % bolus 1,000 mL (0 mL Intravenous Stopped 3/10/23 0134)                   MEDICAL DECISION MAKING, PROGRESS, and CONSULTS    All labs have been independently reviewed by me.  All radiology studies have been reviewed by me and I have also reviewed the radiology report.   EKG's independently viewed and interpreted by me.  Discussion below represents my analysis of pertinent findings related to patient's condition, differential diagnosis, treatment plan and final disposition.      Additional sources:  - Discussed/ obtained information  from independent historians: History obtained from the patient and the patient's family at bedside    - External (non-ED) record review: Upon medical records review, the patient was admitted to the hospital on 5/24/2021 through 6/14/2021 secondary to abdominal pain where she was diagnosed with a cecal mass/cecal cancer.    - Chronic or social conditions impacting care: Atrial fibrillation/SVT    - Shared decision making: Admission decision based on shared conversations had between myself as well as the patient and patient's family at bedside.    Case discussed with PAULIE Calvillo for A, who will admit the patient to the hospital for Dr. Leon.      Orders placed during this visit:  Orders Placed This Encounter   Procedures   • Blood Culture - Blood,   • Blood Culture - Blood,   • Respiratory Panel PCR w/COVID-19(SARS-CoV-2) IRINEO/KYRA/RED/PAD/COR/MAD/ERICH In-House, NP Swab in UTM/VTM, 3-4 HR TAT - Swab, Nasopharynx   • XR Chest 1 View   • [place] Draw   • Comprehensive Metabolic Panel   • Single High Sensitivity Troponin T   • Magnesium   • Urinalysis With Microscopic If Indicated (No Culture) - Urine, Clean Catch   • CBC Auto Differential   • Lactic Acid, Plasma   • Procalcitonin   • Urinalysis, Microscopic Only - Urine, Clean Catch   • NPO Diet NPO Type: Strict NPO   • Undress & Gown   • Cardiac Monitoring   • Continuous Pulse Oximetry   • Vital Signs   • LHA (on-call MD unless specified) Details   • Oxygen Therapy- Nasal Cannula; 2 LPM; Titrate for SPO2: 92%, Greater Than or Equal To   • ECG 12 Lead ED Triage Standing Order; Weak / Dizzy / AMS   • Insert Peripheral IV   • Inpatient Admission   • Fall Precautions   • CBC & Differential   • Green Top (Gel)   • Lavender Top   • Gold Top - SST   • Light Blue Top               Differential diagnosis:    Differential diagnosis includes but is not limited to acute coronary syndrome, COVID-19 viral infection, urinary tract infection, pneumonia, acute anemia,  sepsis, or electrolyte disturbance.      Independent interpretation of labs, radiology studies, and discussions with consultants:    Chest x-ray independently interpreted by myself showing no acute edema, infiltrate, or pneumothorax.      ED Course as of 03/10/23 0251   Fri Mar 10, 2023   0205 On reevaluation, the patient does report significant improvement in her weakness however she is currently lying in the bed nonexertional.  She does continue to complain of an ongoing cough but denies shortness of breath.  I did inform her that she is COVID-19 positive.  Given the weakness combined with COVID-19 and her age, we will admit her to the hospital today for further monitoring and treatment.  The patient and family are in agreement with that plan and all questions have been answered. [BM]   0250 The patient's presentation, work-up, as well as treatment plan and disposition were discussed at length with PAULIE Calvillo for A.  She agrees to admit the patient to the hospital today for Dr. Leon. [BM]      ED Course User Index  [BM] Bravo Long MD         The patient was wearing a facemask upon entrance into the room and remained in such throughout their visit.  I was wearing PPE including a facemask, eye protection, as well as gloves at any point entering the room and throughout the visit      DIAGNOSIS  Final diagnoses:   Generalized weakness   COVID-19 virus infection         DISPOSITION  ADMISSION    Discussed treatment plan and reason for admission with pt/family and admitting physician.  Pt/family voiced understanding of the plan for admission for further testing/treatment as needed.                 Latest Documented Vital Signs:  As of 02:51 EST  BP- 114/64 HR- 69 Temp- 99.5 °F (37.5 °C) (Tympanic) O2 sat- 94%              --    Please note that portions of this were completed with a voice recognition program.       Note Disclaimer: At [facility], we believe that sharing information builds  trust and better relationships. You are receiving this note because you are receiving care at [facility] or recently visited. It is possible you will see health information before a provider has talked with you about it. This kind of information can be easy to misunderstand. To help you fully understand what it means for your health, we urge you to discuss this note with your provider.           Bravo Long MD  03/10/23 0251

## 2023-03-10 NOTE — THERAPY EVALUATION
Patient Name: Jaqueline Rojas  : 1924    MRN: [id number]                              Today's Date: 3/10/2023       Admit Date: 3/9/2023    Visit Dx:     ICD-10-CM ICD-9-CM   1. Generalized weakness  R53.1 780.79   2. COVID-19 virus infection  U07.1 079.89     Patient Active Problem List   Diagnosis   • Irregular heart rate   • Shortness of breath on exertion   • Supraventricular tachycardia (HCC)   • PVC (premature ventricular contraction)   • PSVT (paroxysmal supraventricular tachycardia) (HCC)   • Palpitations   • Ischemic colitis (HCC)   • Mitral valve regurgitation   • Anemia   • Superior mesenteric artery stenosis (HCC)   • Iron deficiency anemia due to chronic blood loss   • Cecum mass   • Hyponatremia   • Cecal cancer (HCC)   • Generalized weakness   • History of colon cancer   • COVID-19 virus detected   • History of PSVT (paroxysmal supraventricular tachycardia)     Past Medical History:   Diagnosis Date   • Afib (HCC)    • Arthritis    • Cancer (HCC)    • CANCINO (dyspnea on exertion)    • Frequent PVCs     multifocal   • Hypertension    • Hypotension    • Irregular heart beat    • Knee injury    • Mitral regurgitation     Moderate to Severe per Echocardiogram 2016   • Palpitations    • PONV (postoperative nausea and vomiting)    • Supraventricular tachycardia (HCC)    • Wrist fracture      Past Surgical History:   Procedure Laterality Date   • COLON RESECTION N/A 2021    Procedure: COLON RESECTION RIGHT;  Surgeon: Baldemar Griffin Jr., MD;  Location: [facility];  Service: General;  Laterality: N/A;   • MASTOID SURGERY     • SKIN GRAFT     • TONSILLECTOMY     • WRIST SURGERY        General Information     Row Name 03/10/23 1452          Physical Therapy Time and Intention    Document Type evaluation;discharge evaluation/summary  -EJ     Mode of Treatment physical therapy  -EJ     Row Name 03/10/23 1452          General Information    Patient Profile Reviewed yes  -EJ     Prior Level of  Function independent:;ADL's;all household mobility;community mobility  uses cane for ambulation  -EJ     Existing Precautions/Restrictions no known precautions/restrictions  -EJ     Barriers to Rehab none identified  -     Row Name 03/10/23 1452          Living Environment    People in Home child(paula), adult  -     Row Name 03/10/23 1452          Cognition    Orientation Status (Cognition) oriented x 3  -EJ           User Key  (r) = Recorded By, (t) = Taken By, (c) = Cosigned By    Initials Name Provider Type     Ivy Rider, PT Physical Therapist               Mobility     Row Name 03/10/23 1454          Bed Mobility    Bed Mobility supine-sit;sit-supine  -EJ     Supine-Sit [place] (Bed Mobility) verbal cues;standby assist;contact guard  -EJ     Sit-Supine [place] (Bed Mobility) verbal cues;standby assist;contact guard  -EJ     Assistive Device (Bed Mobility) head of bed elevated;bed rails  -EJ     Row Name 03/10/23 1454          Sit-Stand Transfer    Sit-Stand [place] (Transfers) verbal cues;standby assist  -EJ     Comment, (Sit-Stand Transfer) HHA  -EJ     Row Name 03/10/23 1454          Gait/Stairs (Locomotion)    [place] Level (Gait) verbal cues;contact guard  -EJ     Assistive Device (Gait) --  HHA  -EJ     Distance in Feet (Gait) 80  -EJ     Deviations/Abnormal Patterns (Gait) maricruz decreased;stride length decreased  -EJ     Bilateral Gait Deviations forward flexed posture  -EJ     Comment, (Gait/Stairs) no unsteadiness or LOB noted  -EJ           User Key  (r) = Recorded By, (t) = Taken By, (c) = Cosigned By    Initials Name Provider Type     Ivy Rider, PT Physical Therapist               Obj/Interventions     Row Name 03/10/23 1456          Range of Motion Comprehensive    General Range of Motion no range of motion deficits identified  -     Row Name 03/10/23 1456          Strength Comprehensive (MMT)    Comment, General Manual Muscle Testing (MMT) Assessment  generalized weakness  -EJ           User Key  (r) = Recorded By, (t) = Taken By, (c) = Cosigned By    Initials Name Provider Type    Ivy Gallo, PT Physical Therapist               Goals/Plan    No documentation.                Clinical Impression     Row Name 03/10/23 1456          Pain    Pretreatment Pain Rating 0/10 - no pain  -EJ     Row Name 03/10/23 1456          Plan of Care Review    Plan of Care Reviewed With patient  -EJ     Progress improving  -EJ     Outcome Evaluation Pt agreeable to PTthis afternoon. SHe was admitted to [facility] yesterday w complaints of generalized weakness. Pt found to be COVID +. SHe does have a cough, but no SOA at this time. At baseline, pt lives w her daughter. She is typically independent w mobility and ADLs. She does use cane for ambulation. Pt currently doing well w no complaints. She is eager to go home soon. Pt able to perform bed mobiltiy w SBA. She stood w SBA/CGA and then ambulated approx 80 ft in room w HHA. No unsteadiness noted. Pt w no SOA w activity. SHe plans home w daughter. No further acute PT needs at this time. She may ambulate w family or nsg. PT will sign off.  -EJ     Row Name 03/10/23 1456          Therapy Assessment/Plan (PT)    Criteria for Skilled Interventions Met (PT) no problems identified which require skilled intervention  -EJ     Therapy Frequency (PT) evaluation only  -EJ     Row Name 03/10/23 1456          Positioning and Restraints    Pre-Treatment Position in bed  -EJ     Post Treatment Position bed  -EJ     In Bed notified nsg;supine;call light within reach;encouraged to call for assist;exit alarm on  -EJ           User Key  (r) = Recorded By, (t) = Taken By, (c) = Cosigned By    Initials Name Provider Type    Ivy Gallo, PT Physical Therapist               Outcome Measures     Row Name 03/10/23 1509 03/10/23 0717       How much help from another person do you currently need...    Turning from your back to your side while in  flat bed without using bedrails? 4  -EJ 4  -GK    Moving from lying on back to sitting on the side of a flat bed without bedrails? 4  -EJ 4  -GK    Moving to and from a bed to a chair (including a wheelchair)? 3  -EJ 3  -GK    Standing up from a chair using your arms (e.g., wheelchair, bedside chair)? 3  -EJ 3  -GK    Climbing 3-5 steps with a railing? 3  -EJ 3  -GK    To walk in hospital room? 3  -EJ 3  -GK    AM-PAC 6 Clicks Score (PT) 20  -EJ 20  -GK    Highest level of mobility 6 --> Walked 10 steps or more  -EJ 6 --> Walked 10 steps or more  -GK          User Key  (r) = Recorded By, (t) = Taken By, (c) = Cosigned By    Initials Name Provider Type    Ivy Gallo, PT Physical Therapist    Laury Dallas, RN Registered Nurse                               PT Recommendation and Plan     Plan of Care Reviewed With: patient  Progress: improving  Outcome Evaluation: Pt agreeable to PTthis afternoon. SHe was admitted to [facility] yesterday w complaints of generalized weakness. Pt found to be COVID +. SHe does have a cough, but no SOA at this time. At baseline, pt lives w her daughter. She is typically independent w mobility and ADLs. She does use cane for ambulation. Pt currently doing well w no complaints. She is eager to go home soon. Pt able to perform bed mobiltiy w SBA. She stood w SBA/CGA and then ambulated approx 80 ft in room w HHA. No unsteadiness noted. Pt w no SOA w activity. SHe plans home w daughter. No further acute PT needs at this time. She may ambulate w family or nsg. PT will sign off.     Time Calculation:    PT Charges     Row Name 03/10/23 1510             Time Calculation    Start Time 1338  -EJ      Stop Time 1355  -EJ      Time Calculation (min) 17 min  -EJ      PT Received On 03/10/23  -EJ         Time Calculation- PT    Total Timed Code Minutes- PT 12 minute(s)  -EJ            User Key  (r) = Recorded By, (t) = Taken By, (c) = Cosigned By    Initials Name Provider Type    VERONICA Rider  Ivy RODRIGUEZ, PT Physical Therapist              Therapy Charges for Today     Code Description Service Date Service Provider Modifiers Qty    77980013685 HC PT EVAL LOW COMPLEXITY 3 3/10/2023 Ivy Rider, PT GP 1    08984064891 HC PT THER PROC EA 15 MIN 3/10/2023 Ivy Rider, PT GP 1          PT G-Codes  AM-PAC 6 Clicks Score (PT): 20  PT Discharge Summary  Anticipated Discharge Disposition (PT): home with assist    Ivy Rider, PT  3/10/2023

## 2023-03-10 NOTE — PROGRESS NOTES
"Nutrition Services    Patient Name:  Jaqueline Rojas  YOB: 1924  MRN: [id number]  Admit Date:  3/9/2023    Assessment Date:  03/10/23    CLINICAL NUTRITION ASSESSMENT     Encounter Information         Reason for Encounter Low BMI-18     Admitting Diagnosis COVID-19     Pertinent Medical History Superior mesenteric artery stenosis, iron deficient anemia due to chronic blood loss, hx of colon cancer    Current Issues Attempted to call patient. Nutrition following for low BMI and MST 2. No wt loss noted per wt hx. No po intake recorded in EMR. Will monitor po intakes. Ordered boost BID to help meet nutrition.      Current Nutrition Orders & Evaluation of Intake       Oral Nutrition     Current PO Diet Diet: Regular/House Diet; Texture: Regular Texture (IDDSI 7); Fluid Consistency: Thin (IDDSI 0)   Supplement    PO Evaluation     Trending % PO Intake Insufficient data    --  Anthropometrics          Height    Weight Height: 162.6 cm (64\")  Weight: 47.6 kg (105 lb) (03/09/23 2059)    BMI kg/m2 Body mass index is 18.02 kg/m².    Weight Trend/Change Stable    Weight History  Wt Readings from Last 15 Encounters:   03/09/23 2059 47.6 kg (105 lb)   08/03/21 1138 46.8 kg (103 lb 3.2 oz)   07/01/21 1350 45.1 kg (99 lb 6.4 oz)   07/01/21 1229 54.9 kg (121 lb 0.5 oz)   05/25/21 1052 54.9 kg (121 lb)   05/24/21 1604 54.9 kg (121 lb)   03/11/20 0445 54.9 kg (121 lb)   02/06/20 1100 53.5 kg (118 lb)   02/02/20 1614 54.4 kg (120 lb)   07/25/19 1035 56.2 kg (124 lb)   11/06/18 1450 53.3 kg (117 lb 9.6 oz)   07/24/18 1029 57.6 kg (127 lb)   01/23/18 0952 56.7 kg (125 lb)   01/06/18 0500 59 kg (130 lb)   01/05/18 0612 59.4 kg (130 lb 14.4 oz)   01/05/18 0023 61.2 kg (135 lb)   01/04/18 0338 68 kg (150 lb)   05/17/17 1016 59 kg (130 lb)      --  Estimated/Assessed Needs       Energy Requirements    Height for Calculation  Height: 162.6 cm (64\")   Weight for Calculation 105# (47.6 kg)    Method for Estimation  30 kcal/kg, " 35 kcal/kg   EST Needs (kcal/day) 0422-2994       Protein Requirements    Weight for Calculation 105# (47.6 kg)    EST Protein Needs (g/kg) 1.0 - 1.2 gm/kg   EST Daily Needs (g/day) 48-58       Fluid Requirements     Method for Estimation 1 mL/kcal    Estimated Needs (mL/day) 7264-5123       Fluid Deficit    Current Na Level (mEq/L)    Desired Na Level (mEq/L)    Estimated Fluid Deficit (L)       Labs        Pertinent Labs Reviewed, listed below     Results from last 7 days   Lab Units 03/10/23  1110 03/10/23  0915 03/09/23  2345   SODIUM mmol/L  --  134* 133*   POTASSIUM mmol/L  --  3.9 4.1   CHLORIDE mmol/L  --  102 98   CO2 mmol/L  --  22.0 24.8   BUN mg/dL  --  15 15   CREATININE mg/dL 0.71 0.67 0.80   CALCIUM mg/dL  --  7.9* 9.0   BILIRUBIN mg/dL 0.4  --  0.4   ALK PHOS U/L 55  --  62   ALT (SGPT) U/L 15  --  17   AST (SGOT) U/L 31  --  23   GLUCOSE mg/dL  --  137* 153*     Results from last 7 days   Lab Units 03/10/23  1110 03/10/23  0915 03/09/23  2345   MAGNESIUM mg/dL  --   --  2.1   HEMOGLOBIN g/dL  --  10.1* 11.3*   HEMATOCRIT %  --  29.3* 32.4*   WBC 10*3/mm3  --  4.84 8.09   ALBUMIN g/dL 3.0*  --  3.8     Results from last 7 days   Lab Units 03/10/23  0915 03/09/23  2345   PLATELETS 10*3/mm3 130* 162     COVID19   Date Value Ref Range Status   03/10/2023 Detected (C) Not Detected - Ref. Range Final     Lab Results   Component Value Date    HGBA1C 6.00 (H) 05/26/2021          Medications            Scheduled Medications aspirin, 81 mg, Oral, Daily  carvedilol, 3.125 mg, Oral, BID With Meals  enoxaparin, 30 mg, Subcutaneous, Q24H  pantoprazole, 40 mg, Oral, Daily  [START ON 3/11/2023] remdesivir, 100 mg, Intravenous, Q24H  sodium chloride, 10 mL, Intravenous, Q12H        Infusions      PRN Medications •  acetaminophen **OR** acetaminophen **OR** acetaminophen  •  benzonatate  •  calcium carbonate  •  loperamide  •  ondansetron **OR** ondansetron  •  sodium chloride  •  sodium chloride  •  sodium chloride      Physical Findings         Skin, GI, Oral, LDA Alert, oriented, underweight, teeth missing, skin intact, last BM 3/9 diarrhea     NFPE Pending, due to: patient in COVID isolation    --  PES STATEMENT / NUTRITION DIAGNOSIS      Nutrition Dx Problem  Problem: Underweight  Etiology: Medical Diagnosis  Signs/Symptoms: BMI    Comment:      NUTRITION INTERVENTION / PLAN OF CARE  Intervention Goal        Intervention Goal(s) Reduce/improve symptoms, Meet estimated needs, Disease management/therapy, Tolerate PO , Appropriate weight gain and PO intake goal %: 75     Nutrition Intervention        RD Action Interview for preferences, Supplement provided, Follow Tx Progress and Care plan reviewed     Nutrition Prescription          Diet      Supplement/Snack Boost BID    EN/PN      Prescription Ordered Yes     Monitor/Evaluation        Monitor Per protocol   Discharge Needs Pending clinical course   Education Will instruct as appropriate     RD to follow up per protocol.    Electronically signed by:  Yeny Peterson RD  03/10/23 15:36 EST

## 2023-03-11 ENCOUNTER — READMISSION MANAGEMENT (OUTPATIENT)
Dept: CALL CENTER | Facility: HOSPITAL | Age: 88
End: 2023-03-11
Payer: MEDICARE

## 2023-03-11 NOTE — OUTREACH NOTE
Prep Survey    Flowsheet Row Responses   [facility] facility patient discharged from? [place]   Is LACE score < 7 ? No   Eligibility Readm Mgmt   Discharge diagnosis COVID-19 virus detected    Does the patient have one of the following disease processes/diagnoses(primary or secondary)? Other   Does the patient have Home health ordered? No   Prep survey completed? Yes          ISABELLE OSORIO - Registered Nurse

## 2023-03-15 ENCOUNTER — READMISSION MANAGEMENT (OUTPATIENT)
Dept: CALL CENTER | Facility: HOSPITAL | Age: 88
End: 2023-03-15
Payer: MEDICARE

## 2023-03-15 LAB
BACTERIA SPEC AEROBE CULT: NORMAL
BACTERIA SPEC AEROBE CULT: NORMAL

## 2023-03-15 NOTE — OUTREACH NOTE
Medical Week 1 Survey    Flowsheet Row Responses   [facility] patient discharged from? [place]   Does the patient have one of the following disease processes/diagnoses(primary or secondary)? Other   Week 1 attempt successful? No   Unsuccessful attempts Attempt 1          Sylvia Booker Registered Nurse

## 2023-03-20 ENCOUNTER — READMISSION MANAGEMENT (OUTPATIENT)
Dept: CALL CENTER | Facility: HOSPITAL | Age: 88
End: 2023-03-20
Payer: MEDICARE

## 2023-03-20 NOTE — OUTREACH NOTE
Medical Week 1 Survey    Flowsheet Row Responses   [facility] patient discharged from? [place]   Does the patient have one of the following disease processes/diagnoses(primary or secondary)? Other   Week 1 attempt successful? No   Unsuccessful attempts Attempt 2          Lisas RAMIREZ - Registered Nurse

## 2023-03-24 ENCOUNTER — READMISSION MANAGEMENT (OUTPATIENT)
Dept: CALL CENTER | Facility: HOSPITAL | Age: 88
End: 2023-03-24
Payer: MEDICARE

## 2023-03-24 NOTE — OUTREACH NOTE
Medical Week 1 Survey    Flowsheet Row Responses   [facility] patient discharged from? [place]   Does the patient have one of the following disease processes/diagnoses(primary or secondary)? Other   Week 1 attempt successful? No   Unsuccessful attempts Attempt 3          MARIO MUSE - Registered Nurse

## 2024-12-17 ENCOUNTER — HOSPITAL ENCOUNTER (OUTPATIENT)
Facility: HOSPITAL | Age: 89
Discharge: HOME | End: 2024-12-17
Attending: EMERGENCY MEDICINE | Admitting: PHYSICIAN ASSISTANT
Payer: MEDICARE

## 2024-12-17 VITALS
DIASTOLIC BLOOD PRESSURE: 56 MMHG | SYSTOLIC BLOOD PRESSURE: 128 MMHG | HEART RATE: 64 BPM | TEMPERATURE: 96.8 F | BODY MASS INDEX: 18.69 KG/M2 | HEIGHT: 62 IN | RESPIRATION RATE: 18 BRPM | OXYGEN SATURATION: 98 % | WEIGHT: 101.6 LBS

## 2024-12-17 DIAGNOSIS — S81.802A WOUND OF LEFT LOWER EXTREMITY, INITIAL ENCOUNTER: Primary | ICD-10-CM

## 2024-12-17 PROCEDURE — 87070 CULTURE OTHR SPECIMN AEROBIC: CPT | Performed by: PHYSICIAN ASSISTANT

## 2024-12-17 PROCEDURE — 87205 SMEAR GRAM STAIN: CPT | Performed by: PHYSICIAN ASSISTANT

## 2024-12-17 PROCEDURE — G0463 HOSPITAL OUTPT CLINIC VISIT: HCPCS | Performed by: PHYSICIAN ASSISTANT

## 2024-12-17 RX ORDER — CEPHALEXIN 500 MG/1
500 CAPSULE ORAL 3 TIMES DAILY
Qty: 21 CAPSULE | Refills: 0 | Status: SHIPPED | OUTPATIENT
Start: 2024-12-17 | End: 2024-12-24

## 2024-12-17 NOTE — DISCHARGE INSTRUCTIONS
As discussed, I advise that you call the [facility] wound care clinic today to schedule a close follow-up.  It is also very important that you reestablish a dermatologist to recheck your wound.  Please establish a primary care doctor to recheck your symptoms.

## 2024-12-17 NOTE — FSED PROVIDER NOTE
Subjective   History of Present Illness    Patient, hx of skin cancer, noticed a wound to her left lower leg which started about 3 weeks ago with some mild oozing.  Now here due to persistent symptoms.  Denies any history of diabetes.  Denies fever.    Review of Systems   Constitutional:  Negative for activity change and appetite change.   Eyes:  Negative for pain.   Respiratory:  Negative for shortness of breath.    Gastrointestinal:  Negative for nausea and vomiting.   Musculoskeletal:  Negative for arthralgias.   Skin:  Positive for wound. Negative for color change.   Neurological:  Negative for dizziness.   All other systems reviewed and are negative.      Past Medical History:   Diagnosis Date    Afib     Arthritis     Cancer     CANCINO (dyspnea on exertion)     Frequent PVCs     multifocal    Hypertension     Hypotension     Irregular heart beat     Knee injury     Mitral regurgitation     Moderate to Severe per Echocardiogram 8/2016    Palpitations     PONV (postoperative nausea and vomiting)     Supraventricular tachycardia     Wrist fracture        No Known Allergies    Past Surgical History:   Procedure Laterality Date    COLON RESECTION N/A 6/2/2021    Procedure: COLON RESECTION RIGHT;  Surgeon: Baldemar Griffin Jr., MD;  Location: [facility];  Service: General;  Laterality: N/A;    MASTOID SURGERY      SKIN GRAFT      TONSILLECTOMY      WRIST SURGERY         Family History   Problem Relation Age of Onset    Heart disease Mother     Diabetes Mother     Stroke Father     Diabetes Brother        Social History     Socioeconomic History    Marital status:    Tobacco Use    Smoking status: Never    Smokeless tobacco: Never    Tobacco comments:     caffeine use - 1 cup coffee daily   Vaping Use    Vaping status: Never Used   Substance and Sexual Activity    Alcohol use: No    Drug use: No    Sexual activity: Defer           Objective   Physical Exam  Vitals and nursing note reviewed.   Constitutional:        Appearance: Normal appearance. She is normal weight.   HENT:      Head: Normocephalic and atraumatic.      Nose: Nose normal.      Mouth/Throat:      Mouth: Mucous membranes are moist.   Eyes:      Pupils: Pupils are equal, round, and reactive to light.   Cardiovascular:      Rate and Rhythm: Normal rate and regular rhythm.      Pulses: Normal pulses.      Heart sounds: Normal heart sounds.   Pulmonary:      Effort: Pulmonary effort is normal.      Breath sounds: Normal breath sounds.   Musculoskeletal:         General: Normal range of motion.      Cervical back: Normal range of motion.      Right lower leg: No edema.      Left lower leg: No edema.   Skin:     General: Skin is warm.             Comments: Left anterior shin: There is a 1.5cm x 1 cm superficial wound noted with minimal amount of white discharge   Neurological:      General: No focal deficit present.      Mental Status: She is alert.   Psychiatric:         Behavior: Behavior is cooperative.         Procedures           ED Course  ED Course as of 12/17/24 1217   Tue Dec 17, 2024   1217 Discussed with patient family members regarding the wound.  I expressed concern for possible skin cancer.  I emphasized importance of following up with the wound care clinic and to reestablish dermatology. [SS]      ED Course User Index  [SS] Ct Tariq PA-C                                           Medical Decision Making      Final diagnoses:   Wound of left lower extremity, initial encounter       ED Disposition  ED Disposition       ED Disposition   Discharge    Condition   Stable    Comment   --               [facility]  [address] [facility] [address] [phone]        Saumya Hoff, PAULIE  [address] [facility] [id number] [phone]    Call   To renetta martínez primary care    [facility]  [address] [place] [address] [phone]  Call            Medication List        New  Prescriptions      cephalexin 500 MG capsule  Commonly known as: KEFLEX  Take 1 capsule by mouth 3 (Three) Times a Day for 7 days.               Where to Get Your Medications        These medications were sent to [id number] - [place], [place] - [address] - [phone]  - [phone] [address] [address]      Phone: [phone]   cephalexin 500 MG capsule

## 2024-12-20 LAB
BACTERIA SPEC AEROBE CULT: NORMAL
GRAM STN SPEC: NORMAL

## 2024-12-26 ENCOUNTER — OFFICE VISIT (OUTPATIENT)
Dept: WOUND CARE | Facility: HOSPITAL | Age: 89
End: 2024-12-26
Payer: MEDICARE

## 2024-12-26 PROCEDURE — 97602 WOUND(S) CARE NON-SELECTIVE: CPT

## 2024-12-26 PROCEDURE — G0463 HOSPITAL OUTPT CLINIC VISIT: HCPCS

## (undated) DEVICE — SUT PDS 1 CT1 36IN Z347H

## (undated) DEVICE — SUT SILK 3/0 SH CR5 18IN C0135

## (undated) DEVICE — APPL CHLORAPREP HI/LITE 26ML ORNG

## (undated) DEVICE — PK PROC MAJ 40

## (undated) DEVICE — STPLR SKIN VISISTAT WD 35CT

## (undated) DEVICE — COVER,MAYO STAND,STERILE: Brand: MEDLINE

## (undated) DEVICE — TRAP FLD MINIVAC MEGADYNE 100ML

## (undated) DEVICE — ANTIBACTERIAL UNDYED BRAIDED (POLYGLACTIN 910), SYNTHETIC ABSORBABLE SUTURE: Brand: COATED VICRYL

## (undated) DEVICE — TOTAL TRAY, 16FR 10ML SIL FOLEY, URN: Brand: MEDLINE

## (undated) DEVICE — MEDI-VAC YANKAUER SUCTION HANDLE W/BULBOUS TIP: Brand: CARDINAL HEALTH

## (undated) DEVICE — POOLE SUCTION HANDLE: Brand: CARDINAL HEALTH

## (undated) DEVICE — PENCL E/S ULTRAVAC TELESCP NOSE HOLSTR 10FT

## (undated) DEVICE — ELECTRD BLD EZ CLN STD 6.5IN

## (undated) DEVICE — SUT VIC 2/0 TIES 18IN J111T

## (undated) DEVICE — ENSEAL 20 CM SHAFT, LARGE JAW: Brand: ENSEAL X1

## (undated) DEVICE — NDL HYPO ECLPS SFTY 22G 1 1/2IN

## (undated) DEVICE — DRSNG WND BORDR/ADHS NONADHR/GZ LF 4X14IN STRL

## (undated) DEVICE — GLV SURG PREMIERPRO ORTHO LTX PF SZ7.5 BRN

## (undated) DEVICE — PENCL ES MEGADINE EZ/CLEAN BUTN W/HOLSTR 10FT

## (undated) DEVICE — SUT VIC 0 TIES 18IN J912G